# Patient Record
Sex: FEMALE | Race: WHITE | NOT HISPANIC OR LATINO | URBAN - METROPOLITAN AREA
[De-identification: names, ages, dates, MRNs, and addresses within clinical notes are randomized per-mention and may not be internally consistent; named-entity substitution may affect disease eponyms.]

---

## 2017-02-24 ENCOUNTER — EMERGENCY (EMERGENCY)
Facility: HOSPITAL | Age: 82
LOS: 1 days | Discharge: HOME | End: 2017-02-24
Attending: EMERGENCY MEDICINE | Admitting: EMERGENCY MEDICINE
Payer: MEDICARE

## 2017-02-24 VITALS
DIASTOLIC BLOOD PRESSURE: 75 MMHG | WEIGHT: 147.05 LBS | OXYGEN SATURATION: 97 % | SYSTOLIC BLOOD PRESSURE: 120 MMHG | HEART RATE: 69 BPM | RESPIRATION RATE: 14 BRPM | HEIGHT: 62 IN | TEMPERATURE: 98 F

## 2017-02-24 VITALS
HEART RATE: 72 BPM | RESPIRATION RATE: 16 BRPM | DIASTOLIC BLOOD PRESSURE: 76 MMHG | OXYGEN SATURATION: 99 % | TEMPERATURE: 98 F | SYSTOLIC BLOOD PRESSURE: 132 MMHG

## 2017-02-24 DIAGNOSIS — R10.84 GENERALIZED ABDOMINAL PAIN: ICD-10-CM

## 2017-02-24 DIAGNOSIS — F03.90 UNSPECIFIED DEMENTIA, UNSPECIFIED SEVERITY, WITHOUT BEHAVIORAL DISTURBANCE, PSYCHOTIC DISTURBANCE, MOOD DISTURBANCE, AND ANXIETY: ICD-10-CM

## 2017-02-24 DIAGNOSIS — E03.9 HYPOTHYROIDISM, UNSPECIFIED: ICD-10-CM

## 2017-02-24 LAB
ALBUMIN SERPL ELPH-MCNC: 3.3 G/DL — SIGNIFICANT CHANGE UP (ref 3.3–5)
ALP SERPL-CCNC: 52 U/L — SIGNIFICANT CHANGE UP (ref 40–120)
ALT FLD-CCNC: 10 U/L — LOW (ref 12–78)
ANION GAP SERPL CALC-SCNC: 9 MMOL/L — SIGNIFICANT CHANGE UP (ref 5–17)
AST SERPL-CCNC: 9 U/L — LOW (ref 15–37)
BASOPHILS # BLD AUTO: 0 K/UL — SIGNIFICANT CHANGE UP (ref 0–0.2)
BASOPHILS NFR BLD AUTO: 0.6 % — SIGNIFICANT CHANGE UP (ref 0–2)
BILIRUB SERPL-MCNC: 0.4 MG/DL — SIGNIFICANT CHANGE UP (ref 0.2–1.2)
BUN SERPL-MCNC: 18 MG/DL — SIGNIFICANT CHANGE UP (ref 7–23)
CALCIUM SERPL-MCNC: 8.8 MG/DL — SIGNIFICANT CHANGE UP (ref 8.5–10.1)
CHLORIDE SERPL-SCNC: 106 MMOL/L — SIGNIFICANT CHANGE UP (ref 96–108)
CO2 SERPL-SCNC: 26 MMOL/L — SIGNIFICANT CHANGE UP (ref 22–31)
CREAT SERPL-MCNC: 0.95 MG/DL — SIGNIFICANT CHANGE UP (ref 0.5–1.3)
EOSINOPHIL # BLD AUTO: 0.1 K/UL — SIGNIFICANT CHANGE UP (ref 0–0.5)
EOSINOPHIL NFR BLD AUTO: 2.4 % — SIGNIFICANT CHANGE UP (ref 0–6)
GLUCOSE SERPL-MCNC: 125 MG/DL — HIGH (ref 70–99)
HCT VFR BLD CALC: 37.4 % — SIGNIFICANT CHANGE UP (ref 34.5–45)
HGB BLD-MCNC: 11.8 G/DL — SIGNIFICANT CHANGE UP (ref 11.5–15.5)
INR BLD: 1.25 RATIO — HIGH (ref 0.88–1.16)
LIDOCAIN IGE QN: 110 U/L — SIGNIFICANT CHANGE UP (ref 73–393)
LYMPHOCYTES # BLD AUTO: 0.6 K/UL — LOW (ref 1–3.3)
LYMPHOCYTES # BLD AUTO: 12.3 % — LOW (ref 13–44)
MCHC RBC-ENTMCNC: 27 PG — SIGNIFICANT CHANGE UP (ref 27–34)
MCHC RBC-ENTMCNC: 31.7 GM/DL — LOW (ref 32–36)
MCV RBC AUTO: 85.1 FL — SIGNIFICANT CHANGE UP (ref 80–100)
MONOCYTES # BLD AUTO: 0.4 K/UL — SIGNIFICANT CHANGE UP (ref 0–0.9)
MONOCYTES NFR BLD AUTO: 9 % — SIGNIFICANT CHANGE UP (ref 1–9)
NEUTROPHILS # BLD AUTO: 3.6 K/UL — SIGNIFICANT CHANGE UP (ref 1.8–7.4)
NEUTROPHILS NFR BLD AUTO: 75.8 % — SIGNIFICANT CHANGE UP (ref 43–77)
PLATELET # BLD AUTO: 108 K/UL — LOW (ref 150–400)
POTASSIUM SERPL-MCNC: 3.9 MMOL/L — SIGNIFICANT CHANGE UP (ref 3.5–5.3)
POTASSIUM SERPL-SCNC: 3.9 MMOL/L — SIGNIFICANT CHANGE UP (ref 3.5–5.3)
PROT SERPL-MCNC: 6.7 G/DL — SIGNIFICANT CHANGE UP (ref 6–8.3)
PROTHROM AB SERPL-ACNC: 13.9 SEC — HIGH (ref 10–13.1)
RBC # BLD: 4.39 M/UL — SIGNIFICANT CHANGE UP (ref 3.8–5.2)
RBC # FLD: 14 % — SIGNIFICANT CHANGE UP (ref 10.3–14.5)
SODIUM SERPL-SCNC: 141 MMOL/L — SIGNIFICANT CHANGE UP (ref 135–145)
WBC # BLD: 4.8 K/UL — SIGNIFICANT CHANGE UP (ref 3.8–10.5)
WBC # FLD AUTO: 4.8 K/UL — SIGNIFICANT CHANGE UP (ref 3.8–10.5)

## 2017-02-24 PROCEDURE — 74176 CT ABD & PELVIS W/O CONTRAST: CPT | Mod: 26

## 2017-02-24 PROCEDURE — 99284 EMERGENCY DEPT VISIT MOD MDM: CPT

## 2017-02-24 PROCEDURE — 85027 COMPLETE CBC AUTOMATED: CPT

## 2017-02-24 PROCEDURE — 99284 EMERGENCY DEPT VISIT MOD MDM: CPT | Mod: 25

## 2017-02-24 PROCEDURE — 74176 CT ABD & PELVIS W/O CONTRAST: CPT

## 2017-02-24 PROCEDURE — 83690 ASSAY OF LIPASE: CPT

## 2017-02-24 PROCEDURE — 85610 PROTHROMBIN TIME: CPT

## 2017-02-24 PROCEDURE — 80053 COMPREHEN METABOLIC PANEL: CPT

## 2017-02-24 RX ORDER — SODIUM CHLORIDE 9 MG/ML
1000 INJECTION INTRAMUSCULAR; INTRAVENOUS; SUBCUTANEOUS ONCE
Qty: 0 | Refills: 0 | Status: COMPLETED | OUTPATIENT
Start: 2017-02-24 | End: 2017-02-24

## 2017-02-24 RX ORDER — IOHEXOL 300 MG/ML
30 INJECTION, SOLUTION INTRAVENOUS ONCE
Qty: 0 | Refills: 0 | Status: COMPLETED | OUTPATIENT
Start: 2017-02-24 | End: 2017-02-24

## 2017-02-24 RX ADMIN — SODIUM CHLORIDE 1000 MILLILITER(S): 9 INJECTION INTRAMUSCULAR; INTRAVENOUS; SUBCUTANEOUS at 11:33

## 2017-02-24 RX ADMIN — IOHEXOL 30 MILLILITER(S): 300 INJECTION, SOLUTION INTRAVENOUS at 17:19

## 2017-02-24 NOTE — ED PROVIDER NOTE - OBJECTIVE STATEMENT
89 female sent to ER from [facility] living for r/o obstruction. Patient provides no information, details obtained from chart.

## 2017-02-24 NOTE — ED ADULT NURSE NOTE - OBJECTIVE STATEMENT
pt to er sent for evaluation ab pain is confused unable to answer questions colostomy functioning pink stoma iv inserted 20 qngio left ac pt has difficulty drinking for ct scan

## 2017-02-24 NOTE — ED PROVIDER NOTE - PROGRESS NOTE DETAILS
no obstruction noted on cat scan no obstruction noted on cat scan, labs normal, patient in no acute distress, to d/c back to assisted living

## 2017-02-24 NOTE — ED ADULT NURSE NOTE - PMH
Breast CA  "many years ago"  Colon cancer  >30 years ago  Dementia    DVT (deep venous thrombosis)  left leg not on A/C  HLD (hyperlipidemia)    Hypothyroidism  newly diagnosed, started on T4 4/2013  Osteoarthritis

## 2017-03-16 ENCOUNTER — APPOINTMENT (OUTPATIENT)
Dept: COLORECTAL SURGERY | Facility: CLINIC | Age: 82
End: 2017-03-16

## 2017-03-16 DIAGNOSIS — K59.09 OTHER CONSTIPATION: ICD-10-CM

## 2017-04-27 ENCOUNTER — INPATIENT (INPATIENT)
Facility: HOSPITAL | Age: 82
LOS: 10 days | Discharge: HOSPICE | DRG: 853 | End: 2017-05-08
Attending: INTERNAL MEDICINE | Admitting: INTERNAL MEDICINE
Payer: MEDICARE

## 2017-04-27 VITALS
RESPIRATION RATE: 16 BRPM | OXYGEN SATURATION: 97 % | SYSTOLIC BLOOD PRESSURE: 114 MMHG | TEMPERATURE: 100 F | DIASTOLIC BLOOD PRESSURE: 74 MMHG | WEIGHT: 173.94 LBS | HEART RATE: 86 BPM

## 2017-04-27 DIAGNOSIS — E78.5 HYPERLIPIDEMIA, UNSPECIFIED: ICD-10-CM

## 2017-04-27 DIAGNOSIS — M19.90 UNSPECIFIED OSTEOARTHRITIS, UNSPECIFIED SITE: ICD-10-CM

## 2017-04-27 DIAGNOSIS — F03.90 UNSPECIFIED DEMENTIA, UNSPECIFIED SEVERITY, WITHOUT BEHAVIORAL DISTURBANCE, PSYCHOTIC DISTURBANCE, MOOD DISTURBANCE, AND ANXIETY: ICD-10-CM

## 2017-04-27 DIAGNOSIS — R01.1 CARDIAC MURMUR, UNSPECIFIED: ICD-10-CM

## 2017-04-27 DIAGNOSIS — A41.9 SEPSIS, UNSPECIFIED ORGANISM: ICD-10-CM

## 2017-04-27 DIAGNOSIS — N20.0 CALCULUS OF KIDNEY: ICD-10-CM

## 2017-04-27 DIAGNOSIS — Z93.3 COLOSTOMY STATUS: ICD-10-CM

## 2017-04-27 DIAGNOSIS — N39.0 URINARY TRACT INFECTION, SITE NOT SPECIFIED: ICD-10-CM

## 2017-04-27 DIAGNOSIS — E03.9 HYPOTHYROIDISM, UNSPECIFIED: ICD-10-CM

## 2017-04-27 DIAGNOSIS — I82.409 ACUTE EMBOLISM AND THROMBOSIS OF UNSPECIFIED DEEP VEINS OF UNSPECIFIED LOWER EXTREMITY: ICD-10-CM

## 2017-04-27 LAB
ALBUMIN SERPL ELPH-MCNC: 2.9 G/DL — LOW (ref 3.3–5)
ALP SERPL-CCNC: 91 U/L — SIGNIFICANT CHANGE UP (ref 40–120)
ALT FLD-CCNC: 16 U/L — SIGNIFICANT CHANGE UP (ref 12–78)
AMYLASE P1 CFR SERPL: 93 U/L — SIGNIFICANT CHANGE UP (ref 25–115)
ANION GAP SERPL CALC-SCNC: 8 MMOL/L — SIGNIFICANT CHANGE UP (ref 5–17)
APPEARANCE UR: ABNORMAL
AST SERPL-CCNC: 19 U/L — SIGNIFICANT CHANGE UP (ref 15–37)
BASOPHILS # BLD AUTO: 0 K/UL — SIGNIFICANT CHANGE UP (ref 0–0.2)
BASOPHILS NFR BLD AUTO: 0.5 % — SIGNIFICANT CHANGE UP (ref 0–2)
BILIRUB SERPL-MCNC: 0.7 MG/DL — SIGNIFICANT CHANGE UP (ref 0.2–1.2)
BILIRUB UR-MCNC: NEGATIVE — SIGNIFICANT CHANGE UP
BUN SERPL-MCNC: 33 MG/DL — HIGH (ref 7–23)
CALCIUM SERPL-MCNC: 8.8 MG/DL — SIGNIFICANT CHANGE UP (ref 8.5–10.1)
CHLORIDE SERPL-SCNC: 103 MMOL/L — SIGNIFICANT CHANGE UP (ref 96–108)
CO2 SERPL-SCNC: 24 MMOL/L — SIGNIFICANT CHANGE UP (ref 22–31)
COLOR SPEC: YELLOW — SIGNIFICANT CHANGE UP
CREAT SERPL-MCNC: 1.9 MG/DL — HIGH (ref 0.5–1.3)
DIFF PNL FLD: ABNORMAL
EOSINOPHIL # BLD AUTO: 0 K/UL — SIGNIFICANT CHANGE UP (ref 0–0.5)
EOSINOPHIL NFR BLD AUTO: 0.3 % — SIGNIFICANT CHANGE UP (ref 0–6)
GLUCOSE SERPL-MCNC: 104 MG/DL — HIGH (ref 70–99)
GLUCOSE UR QL: NEGATIVE — SIGNIFICANT CHANGE UP
HCT VFR BLD CALC: 34.4 % — LOW (ref 34.5–45)
HGB BLD-MCNC: 11.2 G/DL — LOW (ref 11.5–15.5)
KETONES UR-MCNC: ABNORMAL
LACTATE SERPL-SCNC: 1.8 MMOL/L — SIGNIFICANT CHANGE UP (ref 0.7–2)
LACTATE SERPL-SCNC: 2.4 MMOL/L — HIGH (ref 0.7–2)
LEUKOCYTE ESTERASE UR-ACNC: ABNORMAL
LIDOCAIN IGE QN: 132 U/L — SIGNIFICANT CHANGE UP (ref 73–393)
LYMPHOCYTES # BLD AUTO: 0.3 K/UL — LOW (ref 1–3.3)
LYMPHOCYTES # BLD AUTO: 3.8 % — LOW (ref 13–44)
MCHC RBC-ENTMCNC: 28.7 PG — SIGNIFICANT CHANGE UP (ref 27–34)
MCHC RBC-ENTMCNC: 32.6 GM/DL — SIGNIFICANT CHANGE UP (ref 32–36)
MCV RBC AUTO: 88 FL — SIGNIFICANT CHANGE UP (ref 80–100)
MONOCYTES # BLD AUTO: 0.7 K/UL — SIGNIFICANT CHANGE UP (ref 0–0.9)
MONOCYTES NFR BLD AUTO: 9.8 % — HIGH (ref 1–9)
NEUTROPHILS # BLD AUTO: 6.3 K/UL — SIGNIFICANT CHANGE UP (ref 1.8–7.4)
NEUTROPHILS NFR BLD AUTO: 85.5 % — HIGH (ref 43–77)
NITRITE UR-MCNC: POSITIVE
PH UR: 8 — SIGNIFICANT CHANGE UP (ref 5–8)
PLATELET # BLD AUTO: 114 K/UL — LOW (ref 150–400)
POTASSIUM SERPL-MCNC: 4.2 MMOL/L — SIGNIFICANT CHANGE UP (ref 3.5–5.3)
POTASSIUM SERPL-SCNC: 4.2 MMOL/L — SIGNIFICANT CHANGE UP (ref 3.5–5.3)
PROT SERPL-MCNC: 7.4 G/DL — SIGNIFICANT CHANGE UP (ref 6–8.3)
PROT UR-MCNC: 75 MG/DL
RBC # BLD: 3.91 M/UL — SIGNIFICANT CHANGE UP (ref 3.8–5.2)
RBC # FLD: 13.6 % — SIGNIFICANT CHANGE UP (ref 10.3–14.5)
SODIUM SERPL-SCNC: 135 MMOL/L — SIGNIFICANT CHANGE UP (ref 135–145)
SP GR SPEC: 1.01 — SIGNIFICANT CHANGE UP (ref 1.01–1.02)
TSH SERPL-MCNC: 0.67 UIU/ML — SIGNIFICANT CHANGE UP (ref 0.36–3.74)
UROBILINOGEN FLD QL: NEGATIVE — SIGNIFICANT CHANGE UP
WBC # BLD: 7.3 K/UL — SIGNIFICANT CHANGE UP (ref 3.8–10.5)
WBC # FLD AUTO: 7.3 K/UL — SIGNIFICANT CHANGE UP (ref 3.8–10.5)

## 2017-04-27 PROCEDURE — 74176 CT ABD & PELVIS W/O CONTRAST: CPT | Mod: 26

## 2017-04-27 PROCEDURE — 70450 CT HEAD/BRAIN W/O DYE: CPT | Mod: 26

## 2017-04-27 PROCEDURE — 99285 EMERGENCY DEPT VISIT HI MDM: CPT

## 2017-04-27 PROCEDURE — 71010: CPT | Mod: 26

## 2017-04-27 RX ORDER — SODIUM CHLORIDE 9 MG/ML
1000 INJECTION INTRAMUSCULAR; INTRAVENOUS; SUBCUTANEOUS ONCE
Qty: 0 | Refills: 0 | Status: COMPLETED | OUTPATIENT
Start: 2017-04-27 | End: 2017-04-27

## 2017-04-27 RX ORDER — LACTULOSE 10 G/15ML
20 SOLUTION ORAL EVERY OTHER DAY
Qty: 0 | Refills: 0 | Status: DISCONTINUED | OUTPATIENT
Start: 2017-04-27 | End: 2017-05-05

## 2017-04-27 RX ORDER — LORATADINE 10 MG/1
10 TABLET ORAL DAILY
Qty: 0 | Refills: 0 | Status: DISCONTINUED | OUTPATIENT
Start: 2017-04-27 | End: 2017-04-30

## 2017-04-27 RX ORDER — SIMVASTATIN 20 MG/1
10 TABLET, FILM COATED ORAL AT BEDTIME
Qty: 0 | Refills: 0 | Status: DISCONTINUED | OUTPATIENT
Start: 2017-04-27 | End: 2017-04-30

## 2017-04-27 RX ORDER — PIPERACILLIN AND TAZOBACTAM 4; .5 G/20ML; G/20ML
3.38 INJECTION, POWDER, LYOPHILIZED, FOR SOLUTION INTRAVENOUS EVERY 8 HOURS
Qty: 0 | Refills: 0 | Status: DISCONTINUED | OUTPATIENT
Start: 2017-04-27 | End: 2017-05-02

## 2017-04-27 RX ORDER — ACETAMINOPHEN 500 MG
650 TABLET ORAL EVERY 6 HOURS
Qty: 0 | Refills: 0 | Status: DISCONTINUED | OUTPATIENT
Start: 2017-04-27 | End: 2017-05-03

## 2017-04-27 RX ORDER — SODIUM CHLORIDE 9 MG/ML
500 INJECTION INTRAMUSCULAR; INTRAVENOUS; SUBCUTANEOUS ONCE
Qty: 0 | Refills: 0 | Status: COMPLETED | OUTPATIENT
Start: 2017-04-27 | End: 2017-04-27

## 2017-04-27 RX ORDER — HEPARIN SODIUM 5000 [USP'U]/ML
5000 INJECTION INTRAVENOUS; SUBCUTANEOUS EVERY 12 HOURS
Qty: 0 | Refills: 0 | Status: DISCONTINUED | OUTPATIENT
Start: 2017-04-27 | End: 2017-05-05

## 2017-04-27 RX ORDER — CEFTRIAXONE 500 MG/1
1 INJECTION, POWDER, FOR SOLUTION INTRAMUSCULAR; INTRAVENOUS ONCE
Qty: 0 | Refills: 0 | Status: COMPLETED | OUTPATIENT
Start: 2017-04-27 | End: 2017-04-27

## 2017-04-27 RX ORDER — ACETAMINOPHEN 500 MG
650 TABLET ORAL EVERY 6 HOURS
Qty: 0 | Refills: 0 | Status: DISCONTINUED | OUTPATIENT
Start: 2017-04-27 | End: 2017-05-05

## 2017-04-27 RX ORDER — MORPHINE SULFATE 50 MG/1
2 CAPSULE, EXTENDED RELEASE ORAL EVERY 4 HOURS
Qty: 0 | Refills: 0 | Status: DISCONTINUED | OUTPATIENT
Start: 2017-04-27 | End: 2017-04-27

## 2017-04-27 RX ORDER — FAMOTIDINE 10 MG/ML
20 INJECTION INTRAVENOUS DAILY
Qty: 0 | Refills: 0 | Status: DISCONTINUED | OUTPATIENT
Start: 2017-04-27 | End: 2017-04-30

## 2017-04-27 RX ORDER — TAMSULOSIN HYDROCHLORIDE 0.4 MG/1
0.4 CAPSULE ORAL AT BEDTIME
Qty: 0 | Refills: 0 | Status: DISCONTINUED | OUTPATIENT
Start: 2017-04-27 | End: 2017-05-05

## 2017-04-27 RX ORDER — ONDANSETRON 8 MG/1
4 TABLET, FILM COATED ORAL EVERY 6 HOURS
Qty: 0 | Refills: 0 | Status: DISCONTINUED | OUTPATIENT
Start: 2017-04-27 | End: 2017-05-05

## 2017-04-27 RX ORDER — ACETAMINOPHEN 500 MG
650 TABLET ORAL ONCE
Qty: 0 | Refills: 0 | Status: COMPLETED | OUTPATIENT
Start: 2017-04-27 | End: 2017-04-27

## 2017-04-27 RX ORDER — PIPERACILLIN AND TAZOBACTAM 4; .5 G/20ML; G/20ML
3.38 INJECTION, POWDER, LYOPHILIZED, FOR SOLUTION INTRAVENOUS ONCE
Qty: 0 | Refills: 0 | Status: COMPLETED | OUTPATIENT
Start: 2017-04-27 | End: 2017-04-27

## 2017-04-27 RX ORDER — LACTOBACILLUS ACIDOPHILUS 100MM CELL
1 CAPSULE ORAL
Qty: 0 | Refills: 0 | Status: DISCONTINUED | OUTPATIENT
Start: 2017-04-27 | End: 2017-05-05

## 2017-04-27 RX ORDER — SODIUM CHLORIDE 9 MG/ML
3 INJECTION INTRAMUSCULAR; INTRAVENOUS; SUBCUTANEOUS ONCE
Qty: 0 | Refills: 0 | Status: COMPLETED | OUTPATIENT
Start: 2017-04-27 | End: 2017-04-27

## 2017-04-27 RX ORDER — SODIUM CHLORIDE 9 MG/ML
1000 INJECTION INTRAMUSCULAR; INTRAVENOUS; SUBCUTANEOUS
Qty: 0 | Refills: 0 | Status: DISCONTINUED | OUTPATIENT
Start: 2017-04-27 | End: 2017-05-05

## 2017-04-27 RX ORDER — LEVOTHYROXINE SODIUM 125 MCG
100 TABLET ORAL DAILY
Qty: 0 | Refills: 0 | Status: DISCONTINUED | OUTPATIENT
Start: 2017-04-27 | End: 2017-05-05

## 2017-04-27 RX ORDER — FLUTICASONE PROPIONATE 50 MCG
1 SPRAY, SUSPENSION NASAL DAILY
Qty: 0 | Refills: 0 | Status: DISCONTINUED | OUTPATIENT
Start: 2017-04-27 | End: 2017-04-30

## 2017-04-27 RX ORDER — LABETALOL HCL 100 MG
100 TABLET ORAL
Qty: 0 | Refills: 0 | Status: DISCONTINUED | OUTPATIENT
Start: 2017-04-27 | End: 2017-04-30

## 2017-04-27 RX ADMIN — SODIUM CHLORIDE 1000 MILLILITER(S): 9 INJECTION INTRAMUSCULAR; INTRAVENOUS; SUBCUTANEOUS at 15:07

## 2017-04-27 RX ADMIN — SODIUM CHLORIDE 75 MILLILITER(S): 9 INJECTION INTRAMUSCULAR; INTRAVENOUS; SUBCUTANEOUS at 22:35

## 2017-04-27 RX ADMIN — SODIUM CHLORIDE 3 MILLILITER(S): 9 INJECTION INTRAMUSCULAR; INTRAVENOUS; SUBCUTANEOUS at 15:07

## 2017-04-27 RX ADMIN — SODIUM CHLORIDE 1000 MILLILITER(S): 9 INJECTION INTRAMUSCULAR; INTRAVENOUS; SUBCUTANEOUS at 16:00

## 2017-04-27 RX ADMIN — Medication 650 MILLIGRAM(S): at 15:35

## 2017-04-27 RX ADMIN — CEFTRIAXONE 100 GRAM(S): 500 INJECTION, POWDER, FOR SOLUTION INTRAMUSCULAR; INTRAVENOUS at 15:36

## 2017-04-27 RX ADMIN — Medication 650 MILLIGRAM(S): at 23:40

## 2017-04-27 RX ADMIN — SODIUM CHLORIDE 500 MILLILITER(S): 9 INJECTION INTRAMUSCULAR; INTRAVENOUS; SUBCUTANEOUS at 16:59

## 2017-04-27 RX ADMIN — PIPERACILLIN AND TAZOBACTAM 200 GRAM(S): 4; .5 INJECTION, POWDER, LYOPHILIZED, FOR SOLUTION INTRAVENOUS at 22:38

## 2017-04-27 NOTE — ED PROVIDER NOTE - OBJECTIVE STATEMENT
Pt is a 89 yo female who presents to the ED with a cc of fever T max of 101.  Pt confused and unable to provide further history at this time Pt is a 89 yo female who presents to the ED from [facility] with a cc of fever T max of 101.  Pt confused and unable to provide further history at this time.  Has history of dementia and previous CVA with right sided hemiparesis

## 2017-04-27 NOTE — ED PROVIDER NOTE - PROGRESS NOTE DETAILS
Spoke with pt niece who is health care proxy.  Wishes to explore possible intervention.  Urology paged Dr. Chang made aware Dr. Chang spoke with daughter, Jaclyn Meek, [phone], decided not to go for surgical intervention, to do medical management Labs and urine reviewed.  Pt likely suffering from sepsis secondary to UTI.  Concern for obstructing stone at this time based on elevated Cr.  Pt vitals stable, remains confused.  Covered with abx an given fluid boluses.  Will obtain CT renal stone hunt and CT head at this time Spoke with pt niece Anahi Vivar who is health care proxy.  Wishes to explore possible intervention but would like to speak with urologist prior to consent.  Pt is a DNR/DNI and she wants to know all options prior to making decision.  Urology paged Dr. Chang made aware of pt condition.  Suffering from sepsis secondary to  UTI likely caused from obstructing calculi.  Pt ia a DNR/DNI with multiple co-morbid conditions.  Health care proxy's number given he will contact to discuss all options.  Pt signed out to Dr. Young pending decision on OR

## 2017-04-27 NOTE — ED ADULT NURSE NOTE - NS PRO AD PATIENT TYPE
Medical Orders for Life-Sustaining Treatment (MOLST)/Do Not Resuscitate (DNR)/Health Care Proxy (HCP)

## 2017-04-27 NOTE — H&P ADULT - HISTORY OF PRESENT ILLNESS
Pt is a 89 yo female ,[facility] resident with hx od CAD,HTN,HLD,DVT ,Breast ca,colon ca, s/p colostomy ,OA who presents to the ED with a cc of fever T max of 101.  Pt confused and unable  provide further history at this time Diagnosed with renal colic,found to have moderate to severe hydronephrosis with obstructing kidney stone and  eval called by ER Scheduled for OR in am for stent placement as per Dr Chang, urologist arrangement Admit for septic workup and ID evaluation,send blood and urine cx,serial lactate levels,monitor vitals closley,ivfs hydration,monitor urine output and renal profile,iv abx as per id cons Dr Bolden Palliative care consult requested due to overall poor prognosis,to discuss advance directives and complete MOLST

## 2017-04-27 NOTE — PATIENT PROFILE ADULT. - PMH
Hypothyroidism  newly diagnosed, started on T4 4/2013  Osteoarthritis    Colon cancer  >30 years ago  Breast CA  "many years ago"  DVT (deep venous thrombosis)  left leg not on A/C  HLD (hyperlipidemia)    Dementia

## 2017-04-27 NOTE — CONSULT NOTE ADULT - SUBJECTIVE AND OBJECTIVE BOX
Patient is a 89y old  Female who presents with a chief complaint of     HPI:      Asked to see patient for ID Consult    PAST MEDICAL & SURGICAL HISTORY:  Hypothyroidism: newly diagnosed, started on T4 4/2013  Osteoarthritis  Colon cancer: &gt;30 years ago  Breast CA: &quot;many years ago&quot;  DVT (deep venous thrombosis): left leg not on A/C  HLD (hyperlipidemia)  Dementia  History of hysterectomy  Colostomy in place  Colon perforation  Ileostomy in place      Allergies    No Known Allergies    Intolerances        REVIEW OF SYSTEMS:  All systems below were reviewed and are negative [  ]  HEENT:  ID:  Pulmonary:  Cardiac:  GI:  Renal:  Musculoskeletal:  All other systems above were reviewed and are negative   [  ]    HOME MEDICATIONS:    MEDICATIONS  (STANDING):  simvastatin 10milliGRAM(s) Oral at bedtime  labetalol 100milliGRAM(s) Oral two times a day  heparin  Injectable 5000Unit(s) SubCutaneous every 12 hours  sodium chloride 0.9%. 1000milliLiter(s) IV Continuous <Continuous>  levothyroxine 100MICROGram(s) Oral daily  lactobacillus acidophilus 1Tablet(s) Oral two times a day with meals  famotidine    Tablet 20milliGRAM(s) Oral daily  tamsulosin 0.4milliGRAM(s) Oral at bedtime  lactulose Syrup 20Gram(s) Oral every other day  fluticasone propionate 50 MICROgram(s)/spray Nasal Spray 1Spray(s) Alternating Nostrils daily  loratadine 10milliGRAM(s) Oral daily    MEDICATIONS  (PRN):  acetaminophen   Tablet 650milliGRAM(s) Oral every 6 hours PRN For Temp greater than 38 C (100.4 F)  acetaminophen   Tablet. 650milliGRAM(s) Oral every 6 hours PRN Mild Pain (1 - 3)  morphine  - Injectable 2milliGRAM(s) IV Push every 4 hours PRN Moderate Pain (4 - 6)  ondansetron Injectable 4milliGRAM(s) IV Push every 6 hours PRN Nausea and/or Vomiting      Vital Signs Last 24 Hrs  T(C): 37.8, Max: 38.8 (04-27 @ 15:09)  T(F): 100, Max: 101.9 (04-27 @ 15:09)  HR: 79 (79 - 86)  BP: 124/81 (114/74 - 149/65)  BP(mean): --  RR: 15 (15 - 16)  SpO2: 95% (95% - 97%)    PHYSICAL EXAM:  HEENT:  Neck:  Lungs:  Heart:  Abdomen:  Genital/ Rectal:  Extremities:  Neurologic:  Vascular:    I&O's Summary      LABORATORY:                          11.2   7.3   )-----------( 114      ( 27 Apr 2017 15:18 )             34.4           04-27    135  |  103  |  33<H>  ----------------------------<  104<H>  4.2   |  24  |  1.90<H>    Ca    8.8      27 Apr 2017 15:18    TPro  7.4  /  Alb  2.9<L>  /  TBili  0.7  /  DBili  x   /  AST  19  /  ALT  16  /  AlkPhos  91  04-27          LABORATORY:    CBC Full  -  ( 27 Apr 2017 15:18 )  WBC Count : 7.3 K/uL  Hemoglobin : 11.2 g/dL  Hematocrit : 34.4 %  Platelet Count - Automated : 114 K/uL  Mean Cell Volume : 88.0 fl  Mean Cell Hemoglobin : 28.7 pg  Mean Cell Hemoglobin Concentration : 32.6 gm/dL  Auto Neutrophil # : 6.3 K/uL  Auto Lymphocyte # : 0.3 K/uL  Auto Monocyte # : 0.7 K/uL  Auto Eosinophil # : 0.0 K/uL  Auto Basophil # : 0.0 K/uL  Auto Neutrophil % : 85.5 %  Auto Lymphocyte % : 3.8 %  Auto Monocyte % : 9.8 %  Auto Eosinophil % : 0.3 %  Auto Basophil % : 0.5 %          04-27    135  |  103  |  33<H>  ----------------------------<  104<H>  4.2   |  24  |  1.90<H>    Ca    8.8      27 Apr 2017 15:18    TPro  7.4  /  Alb  2.9<L>  /  TBili  0.7  /  DBili  x   /  AST  19  /  ALT  16  /  AlkPhos  91 04-27 Patient is a 89y old  Female who presents with a chief complaint of fever    The patient is a 89 year old female with a history of colon CA, DVT, dementia and hypothyroidism who was brought to the ED for fevers. She had CT of abdomen and pelvis done which showed marked left hydronephrosis with many left renal stones. The patient had a fever of 101.8F and was given IV Rocephin.       PAST MEDICAL & SURGICAL HISTORY:  Hypothyroidism: newly diagnosed, started on T4 4/2013  Osteoarthritis  Colon cancer: &gt;30 years ago  Breast CA: &quot;many years ago&quot;  DVT (deep venous thrombosis): left leg not on A/C  HLD (hyperlipidemia)  Dementia  History of hysterectomy  Colostomy in place  Colon perforation  Ileostomy in place      Allergies    No Known Allergies    REVIEW OF SYSTEMS:    No cough or SOB.  No diarrhea or GI bleed.    HOME MEDICATIONS:    MEDICATIONS  (STANDING):  simvastatin 10milliGRAM(s) Oral at bedtime  labetalol 100milliGRAM(s) Oral two times a day  heparin  Injectable 5000Unit(s) SubCutaneous every 12 hours  sodium chloride 0.9%. 1000milliLiter(s) IV Continuous <Continuous>  levothyroxine 100MICROGram(s) Oral daily  lactobacillus acidophilus 1Tablet(s) Oral two times a day with meals  famotidine    Tablet 20milliGRAM(s) Oral daily  tamsulosin 0.4milliGRAM(s) Oral at bedtime  lactulose Syrup 20Gram(s) Oral every other day  fluticasone propionate 50 MICROgram(s)/spray Nasal Spray 1Spray(s) Alternating Nostrils daily  loratadine 10milliGRAM(s) Oral daily    MEDICATIONS  (PRN):  acetaminophen   Tablet 650milliGRAM(s) Oral every 6 hours PRN For Temp greater than 38 C (100.4 F)  acetaminophen   Tablet. 650milliGRAM(s) Oral every 6 hours PRN Mild Pain (1 - 3)  morphine  - Injectable 2milliGRAM(s) IV Push every 4 hours PRN Moderate Pain (4 - 6)  ondansetron Injectable 4milliGRAM(s) IV Push every 6 hours PRN Nausea and/or Vomiting      Vital Signs Last 24 Hrs  T(C): 37.8, Max: 38.8 (04-27 @ 15:09)  T(F): 100, Max: 101.9 (04-27 @ 15:09)  HR: 79 (79 - 86)  BP: 124/81 (114/74 - 149/65)  BP(mean): --  RR: 15 (15 - 16)  SpO2: 95% (95% - 97%)    PHYSICAL EXAM:  HEENT: NC/AT; PERRLA.  Neck: Soft; no masses.  Lungs: Coarse BS bilaterally; no wheezing  Heart: RRR; no murmurs.  Abdomen: Soft; no masses.  Extremities: No ulcers.  Neuro: lethargic.      LABORATORY:                          11.2   7.3   )-----------( 114      ( 27 Apr 2017 15:18 )             34.4           04-27    135  |  103  |  33<H>  ----------------------------<  104<H>  4.2   |  24  |  1.90<H>    Ca    8.8      27 Apr 2017 15:18    TPro  7.4  /  Alb  2.9<L>  /  TBili  0.7  /  DBili  x   /  AST  19  /  ALT  16  /  AlkPhos  91  04-27          LABORATORY:    CBC Full  -  ( 27 Apr 2017 15:18 )  WBC Count : 7.3 K/uL  Hemoglobin : 11.2 g/dL  Hematocrit : 34.4 %  Platelet Count - Automated : 114 K/uL  Mean Cell Volume : 88.0 fl  Mean Cell Hemoglobin : 28.7 pg  Mean Cell Hemoglobin Concentration : 32.6 gm/dL  Auto Neutrophil # : 6.3 K/uL  Auto Lymphocyte # : 0.3 K/uL  Auto Monocyte # : 0.7 K/uL  Auto Eosinophil # : 0.0 K/uL  Auto Basophil # : 0.0 K/uL  Auto Neutrophil % : 85.5 %  Auto Lymphocyte % : 3.8 %  Auto Monocyte % : 9.8 %  Auto Eosinophil % : 0.3 %  Auto Basophil % : 0.5 %          04-27    135  |  103  |  33<H>  ----------------------------<  104<H>  4.2   |  24  |  1.90<H>    Ca    8.8      27 Apr 2017 15:18    TPro  7.4  /  Alb  2.9<L>  /  TBili  0.7  /  DBili  x   /  AST  19  /  ALT  16  /  AlkPhos  91  04-27        Assessment/plan:    1. Marked left hydronephrosis due to obstructed left renal stones.  2. UTI.  3. Fevers.    . Urology evaluation.  . Add Zosyn 3.375 gm q8h.  . Follow all cultures.    Thank you for the courtesy of this consultation

## 2017-04-27 NOTE — ED PROVIDER NOTE - CARE PLAN
Principal Discharge DX:	Sepsis  Secondary Diagnosis:	UTI (urinary tract infection)  Secondary Diagnosis:	Nephrolithiasis

## 2017-04-27 NOTE — ED PROVIDER NOTE - ENMT, MLM
Airway patent, Nasal mucosa clear. Mouth with normal mucosa. Airway patent, Nasal mucosa clear. Mouth with dry mucosa.

## 2017-04-27 NOTE — H&P ADULT - PROBLEM SELECTOR PLAN 1
Admit for septic workup and ID evaluation,send blood and urine cx,serial lactate levels,monitor vitals closley,ivfs hydration,monitor urine output and renal profile,iv abx as per id cons  malcolm reuested

## 2017-04-27 NOTE — H&P ADULT - ASSESSMENT
Pt is a 89 yo female ,[facility] resident with hx od CAD,HTN,HLD,DVT ,Breast ca,colon ca, s/p colostomy ,OA who presents to the ED with a cc of fever T max of 101.  Pt confused and unable  provide further history at this time Diagnosed with renal colic,found to have moderate to severe hydronephrosis with obstructing kidney stone and  eval called by ER Scheduled for OR in am for stent placement as per Dr Chang, urologist arrangement Admit for septic workup and ID evaluation,send blood and urine cx,serial lactate levels,monitor vitals closley,ivfs hydration,monitor urine output and renal profile,iv abx as per id cons Dr Bolden Palliative care consult requested due to overall poor prognosis,to discuss advance directives and complete MOLST

## 2017-04-27 NOTE — CONSULT NOTE ADULT - SUBJECTIVE AND OBJECTIVE BOX
CARDIOLOGY CONSULT NOTE    Patient is a 89y Female with a known history of :    HPI:  89y/o w/f seen at [facility] ER. Lives at [facility]. Patient seen for fever and she is confused, so therefore history from chart.  History Dementia, Breast cancer, Colon cancer (colostomy in place), high cholesterol, thyroid disease, OA, left DVT.   S/P SALINA.  In ER found to have temperture-101.9 and abnormal U/A.    REVIEW OF SYSTEMS:    CONSTITUTIONAL:   Unable to obtain, patient with dementia, confused.    ALLERGY AND IMMUNOLOGIC: No hives or eczema    MEDICATIONS  (STANDING):    MEDICATIONS  (PRN):      ALLERGIES: No Known Allergies      FAMILY HISTORY:      Social History:  Alochol:   Smoking:   Drug Use:   Marital Status:     I&O's Detail      PHYSICAL EXAMINATION:  -----------------------------  T(C): 38.8, Max: 38.8 (04-27 @ 15:09)  HR: 79 (79 - 86)  BP: 126/60 (114/74 - 149/65)  RR: 16 (15 - 16)  SpO2: 96% (96% - 97%)  Wt(kg): --      Weight (kg): 78.9 (04-27 @ 14:43)    Constitutional: well developed, normal appearance, well groomed, well nourished, no deformities and no acute distress.   Eyes: the conjunctiva exhibited no abnormalities and the eyelids demonstrated no xanthelasmas.   HEENT: normal oral mucosa, no oral pallor and no oral cyanosis.   Neck: normal jugular venous A waves present, normal jugular venous V waves present and no jugular venous hua A waves.   Pulmonary: Poor effort, no respiratory distress, normal respiratory rhythm and effort, no accessory muscle use and lungs were clear to auscultation bilaterally anteriorly.   Cardiovascular: heart rate and rhythm were normal, decreased S1 and normal S2 and II/VI systolic murmur.   Musculoskeletal: the gait could not be assessed..   Extremities: no clubbing of the fingernails, no localized cyanosis, no petechial hemorrhages and no ischemic changes.   Skin: normal skin color and pigmentation, no rash, no venous stasis, no skin lesions, no skin ulcer and no xanthoma was observed.   Psychiatric: oriented to person, place, and time, the affect was normal, the mood was normal and not feeling anxious.     LABS:   --------  04-27    135  |  103  |  33<H>  ----------------------------<  104<H>  4.2   |  24  |  1.90<H>    Ca    8.8      27 Apr 2017 15:18    TPro  7.4  /  Alb  2.9<L>  /  TBili  0.7  /  DBili  x   /  AST  19  /  ALT  16  /  AlkPhos  91  04-27                         11.2   7.3   )-----------( 114      ( 27 Apr 2017 15:18 )             34.4                   RADIOLOGY:  -----------------      EXAM:  CT ABDOMEN AND PELVIS                            PROCEDURE DATE:  04/27/2017        INTERPRETATION:  History: Fever, UTI.    Noncontrast CT abdomen and pelvis. Prior dated 2/24/2017.  Increase in the bibasilar congestion and small effusions.  Multiple left renal pelvic calculi, largest measuring up to 1.7 cm   similar to prior. There is interval increase in left hydronephrosis.   Severe obstructive left hydronephrosis at this time with perirenal   stranding. Correlate for concomitant infection. Study is otherwise   unchanged. Large right lower quadrant ostomy hernia containing   nonobstructive large and small bowel. No acute bowel inflammation.   Moderate rectal fecal impaction. No extraluminal fluid or gas.  Bladder not adequately distended.  No acute skeletal abnormality.    Impression: Marked obstructive left hydronephrosis secondary to multiple   left renal pelvic calculi              BETTINA KELLEY M.D., ATTENDING RADIOLOGIST  This document has been electronically signed. Apr 27 2017  4:48PM            EXAM:  CT BRAIN                            PROCEDURE DATE:  04/27/2017        INTERPRETATION:  History: Altered mental status.    Noncontrast brain CT    Prior 6/14/2016.  Stable ventricular size and configuration. Chronic ischemic white matter   changes chronic lacunar infarcts bilateral basal ganglia left corona   radiata similar to prior. No acute infarct bleed extra-axial collection   or mass effect. Clear sinuses. Cranium intact.    Impression: No acute intracranial pathology. Stable exam              BETTINA KELLEY M.D., ATTENDING RADIOLOGIST  This document has been electronically signed. Apr 27 2017  4:51PM                  ECG: NSR, RBBB, cannot R/O IWMI

## 2017-04-27 NOTE — H&P ADULT - PROBLEM SELECTOR PLAN 7
continue current managmnet and medications Palliative care consult requested due to overall poor prognosis,to discuss advance directives and complete MOLST

## 2017-04-27 NOTE — CONSULT NOTE ADULT - ASSESSMENT
89y/o w/f seen at [facility] ER. Lives at [facility]. Patient seen for fever and she is confused, so therefore history from chart.  History Dementia, Breast cancer, Colon cancer (colostomy in place), high cholesterol, thyroid disease, OA.   S/P SALINA.  In ER found to have temperture-101.9 and abnormal U/A.    Patient is in her optimal cardiac condition and therefore, no obvious, significant cardiac contra-indications for any urological procedure.

## 2017-04-27 NOTE — ED ADULT NURSE NOTE - OBJECTIVE STATEMENT
pt came in ED sent in from [facility] for fever. pt awake, confused rectal temp noted 101.9F. with right ascending colostomy in place. non-labored respiration noted. abdomen slightly distended on the colostomy area. pt noted with right upper arm weakness, from Hx CVA. on fall precaution.

## 2017-04-27 NOTE — ED PROVIDER NOTE - CONDUCTED A DETAILED DISCUSSION WITH PATIENT AND/OR GUARDIAN REGARDING, MDM
need to admit/lab results/radiology results/return to ED if symptoms worsen, persist or questions arise

## 2017-04-28 DIAGNOSIS — Z71.89 OTHER SPECIFIED COUNSELING: ICD-10-CM

## 2017-04-28 DIAGNOSIS — A41.9 SEPSIS, UNSPECIFIED ORGANISM: ICD-10-CM

## 2017-04-28 DIAGNOSIS — F03.90 UNSPECIFIED DEMENTIA, UNSPECIFIED SEVERITY, WITHOUT BEHAVIORAL DISTURBANCE, PSYCHOTIC DISTURBANCE, MOOD DISTURBANCE, AND ANXIETY: ICD-10-CM

## 2017-04-28 DIAGNOSIS — N13.2 HYDRONEPHROSIS WITH RENAL AND URETERAL CALCULOUS OBSTRUCTION: ICD-10-CM

## 2017-04-28 LAB
ALBUMIN SERPL ELPH-MCNC: 2.5 G/DL — LOW (ref 3.3–5)
ALP SERPL-CCNC: 70 U/L — SIGNIFICANT CHANGE UP (ref 40–120)
ALT FLD-CCNC: 13 U/L — SIGNIFICANT CHANGE UP (ref 12–78)
ANION GAP SERPL CALC-SCNC: 10 MMOL/L — SIGNIFICANT CHANGE UP (ref 5–17)
AST SERPL-CCNC: 18 U/L — SIGNIFICANT CHANGE UP (ref 15–37)
BASE EXCESS BLDA CALC-SCNC: -6.4 MMOL/L — LOW (ref -2–2)
BASOPHILS # BLD AUTO: 0 K/UL — SIGNIFICANT CHANGE UP (ref 0–0.2)
BASOPHILS NFR BLD AUTO: 0.6 % — SIGNIFICANT CHANGE UP (ref 0–2)
BILIRUB SERPL-MCNC: 0.5 MG/DL — SIGNIFICANT CHANGE UP (ref 0.2–1.2)
BLOOD GAS COMMENTS ARTERIAL: SIGNIFICANT CHANGE UP
BUN SERPL-MCNC: 27 MG/DL — HIGH (ref 7–23)
CALCIUM SERPL-MCNC: 7.9 MG/DL — LOW (ref 8.5–10.1)
CHLORIDE SERPL-SCNC: 110 MMOL/L — HIGH (ref 96–108)
CO2 SERPL-SCNC: 23 MMOL/L — SIGNIFICANT CHANGE UP (ref 22–31)
CREAT SERPL-MCNC: 1.6 MG/DL — HIGH (ref 0.5–1.3)
EOSINOPHIL # BLD AUTO: 0.1 K/UL — SIGNIFICANT CHANGE UP (ref 0–0.5)
EOSINOPHIL NFR BLD AUTO: 1.4 % — SIGNIFICANT CHANGE UP (ref 0–6)
GLUCOSE SERPL-MCNC: 102 MG/DL — HIGH (ref 70–99)
HCO3 BLDA-SCNC: 20 MMOL/L — LOW (ref 23–27)
HCT VFR BLD CALC: 30.7 % — LOW (ref 34.5–45)
HGB BLD-MCNC: 9.8 G/DL — LOW (ref 11.5–15.5)
HOROWITZ INDEX BLDA+IHG-RTO: 21 — SIGNIFICANT CHANGE UP
INR BLD: 1.41 RATIO — HIGH (ref 0.88–1.16)
LACTATE SERPL-SCNC: 0.9 MMOL/L — SIGNIFICANT CHANGE UP (ref 0.7–2)
LYMPHOCYTES # BLD AUTO: 0.3 K/UL — LOW (ref 1–3.3)
LYMPHOCYTES # BLD AUTO: 5.5 % — LOW (ref 13–44)
MCHC RBC-ENTMCNC: 28.6 PG — SIGNIFICANT CHANGE UP (ref 27–34)
MCHC RBC-ENTMCNC: 32 GM/DL — SIGNIFICANT CHANGE UP (ref 32–36)
MCV RBC AUTO: 89.2 FL — SIGNIFICANT CHANGE UP (ref 80–100)
MONOCYTES # BLD AUTO: 0.6 K/UL — SIGNIFICANT CHANGE UP (ref 0–0.9)
MONOCYTES NFR BLD AUTO: 10.9 % — HIGH (ref 1–9)
NEUTROPHILS # BLD AUTO: 4.4 K/UL — SIGNIFICANT CHANGE UP (ref 1.8–7.4)
NEUTROPHILS NFR BLD AUTO: 81.6 % — HIGH (ref 43–77)
PCO2 BLDA: 25 MMHG — LOW (ref 32–46)
PH BLDA: 7.45 — SIGNIFICANT CHANGE UP (ref 7.35–7.45)
PLATELET # BLD AUTO: 81 K/UL — LOW (ref 150–400)
PO2 BLDA: 55 MMHG — LOW (ref 74–108)
POTASSIUM SERPL-MCNC: 3.9 MMOL/L — SIGNIFICANT CHANGE UP (ref 3.5–5.3)
POTASSIUM SERPL-SCNC: 3.9 MMOL/L — SIGNIFICANT CHANGE UP (ref 3.5–5.3)
PROT SERPL-MCNC: 6.3 G/DL — SIGNIFICANT CHANGE UP (ref 6–8.3)
PROTHROM AB SERPL-ACNC: 15.5 SEC — HIGH (ref 9.8–12.7)
RBC # BLD: 3.45 M/UL — LOW (ref 3.8–5.2)
RBC # FLD: 14.1 % — SIGNIFICANT CHANGE UP (ref 10.3–14.5)
SAO2 % BLDA: 90 % — LOW (ref 92–96)
SODIUM SERPL-SCNC: 143 MMOL/L — SIGNIFICANT CHANGE UP (ref 135–145)
WBC # BLD: 5.4 K/UL — SIGNIFICANT CHANGE UP (ref 3.8–10.5)
WBC # FLD AUTO: 5.4 K/UL — SIGNIFICANT CHANGE UP (ref 3.8–10.5)

## 2017-04-28 PROCEDURE — 93970 EXTREMITY STUDY: CPT | Mod: 26

## 2017-04-28 RX ADMIN — PIPERACILLIN AND TAZOBACTAM 25 GRAM(S): 4; .5 INJECTION, POWDER, LYOPHILIZED, FOR SOLUTION INTRAVENOUS at 21:08

## 2017-04-28 RX ADMIN — PIPERACILLIN AND TAZOBACTAM 25 GRAM(S): 4; .5 INJECTION, POWDER, LYOPHILIZED, FOR SOLUTION INTRAVENOUS at 14:26

## 2017-04-28 RX ADMIN — HEPARIN SODIUM 5000 UNIT(S): 5000 INJECTION INTRAVENOUS; SUBCUTANEOUS at 17:20

## 2017-04-28 RX ADMIN — Medication 1 TABLET(S): at 08:39

## 2017-04-28 RX ADMIN — HEPARIN SODIUM 5000 UNIT(S): 5000 INJECTION INTRAVENOUS; SUBCUTANEOUS at 05:29

## 2017-04-28 RX ADMIN — LORATADINE 10 MILLIGRAM(S): 10 TABLET ORAL at 17:20

## 2017-04-28 RX ADMIN — Medication 1 SPRAY(S): at 11:26

## 2017-04-28 RX ADMIN — PIPERACILLIN AND TAZOBACTAM 25 GRAM(S): 4; .5 INJECTION, POWDER, LYOPHILIZED, FOR SOLUTION INTRAVENOUS at 05:29

## 2017-04-28 RX ADMIN — Medication 100 MICROGRAM(S): at 05:30

## 2017-04-28 RX ADMIN — Medication 1 TABLET(S): at 17:20

## 2017-04-28 RX ADMIN — SIMVASTATIN 10 MILLIGRAM(S): 20 TABLET, FILM COATED ORAL at 21:07

## 2017-04-28 RX ADMIN — Medication 650 MILLIGRAM(S): at 12:40

## 2017-04-28 RX ADMIN — Medication 650 MILLIGRAM(S): at 21:07

## 2017-04-28 RX ADMIN — SODIUM CHLORIDE 75 MILLILITER(S): 9 INJECTION INTRAMUSCULAR; INTRAVENOUS; SUBCUTANEOUS at 21:08

## 2017-04-28 RX ADMIN — TAMSULOSIN HYDROCHLORIDE 0.4 MILLIGRAM(S): 0.4 CAPSULE ORAL at 21:07

## 2017-04-28 NOTE — PROGRESS NOTE ADULT - SUBJECTIVE AND OBJECTIVE BOX
PROGRESS NOTE    OVERNIGHT    SUBJECTIVE    PAST MEDICAL & SURGICAL HISTORY:  Hypothyroidism: newly diagnosed, started on T4 2013  Osteoarthritis  Colon cancer: &gt;30 years ago  Breast CA: &quot;many years ago&quot;  DVT (deep venous thrombosis): left leg not on A/C  HLD (hyperlipidemia)  Dementia  History of hysterectomy  Colostomy in place  Colon perforation  Ileostomy in place    HEALTH ISSUES - PROBLEM Dx:  HLD (hyperlipidemia): HLD (hyperlipidemia)  Colostomy in place: Colostomy in place  Dementia: Dementia  Osteoarthritis: Osteoarthritis  DVT (deep venous thrombosis): DVT (deep venous thrombosis)  Hypothyroidism: Hypothyroidism  Heart murmur: Heart murmur  Nephrolithiasis: Nephrolithiasis  Urinary tract infection with hematuria, site unspecified: Urinary tract infection with hematuria, site unspecified        FAMILY HISTORY:  No pertinent family history in first degree relatives    Allergies    No Known Allergies    Intolerances        MEDICATIONS  (STANDING):  simvastatin 10milliGRAM(s) Oral at bedtime  labetalol 100milliGRAM(s) Oral two times a day  heparin  Injectable 5000Unit(s) SubCutaneous every 12 hours  sodium chloride 0.9%. 1000milliLiter(s) IV Continuous <Continuous>  levothyroxine 100MICROGram(s) Oral daily  lactobacillus acidophilus 1Tablet(s) Oral two times a day with meals  famotidine    Tablet 20milliGRAM(s) Oral daily  tamsulosin 0.4milliGRAM(s) Oral at bedtime  lactulose Syrup 20Gram(s) Oral every other day  fluticasone propionate 50 MICROgram(s)/spray Nasal Spray 1Spray(s) Alternating Nostrils daily  loratadine 10milliGRAM(s) Oral daily  piperacillin/tazobactam IVPB. 3.375Gram(s) IV Intermittent every 8 hours    MEDICATIONS  (PRN):  acetaminophen   Tablet 650milliGRAM(s) Oral every 6 hours PRN For Temp greater than 38 C (100.4 F)  acetaminophen   Tablet. 650milliGRAM(s) Oral every 6 hours PRN Mild Pain (1 - 3)  morphine  - Injectable 2milliGRAM(s) IV Push every 4 hours PRN Moderate Pain (4 - 6)  ondansetron Injectable 4milliGRAM(s) IV Push every 6 hours PRN Nausea and/or Vomiting  acetaminophen  Suppository 650milliGRAM(s) Rectal every 6 hours PRN For Temp greater than 38 C (100.4 F)      OBJECTIVE  PHYSICAL EXAM:  T(C): 37.7, Max: 38.8 ( @ 15:09)  HR: 76 (71 - 86)  BP: 102/69 (102/69 - 149/65)  RR: 16 (15 - 18)  SpO2: 95% (95% - 97%)  Wt(kg): --  I&O's Summary    I & Os for current day (as of 2017 12:40)  =============================================  IN: 800 ml / OUT: 0 ml / NET: 800 ml      Appearance: NAD.   HEENT:   Moist oral mucosa. Conjunctiva clear b/l.   Lymphatic: No cervical lymphadenopathy  Cardiovascular: RRR with no m/r/g.  Respiratory: Lungs CTAB.  Gastrointestinal:  Soft, nontender. No rebound. No rigidity. 	  Skin: No rashes, No ecchymoses, No cyanosis.	  Neurologic: Non-focal. Moving all extremities. Following commands.  Extremities: No edema. No erythema. No calf tenderness. Malvin's negative.  Vascular: DP 2+ b/l.  	  LABS:                        9.8    5.4   )-----------( 81       ( 2017 06:47 )             30.7         143  |  110<H>  |  27<H>  ----------------------------<  102<H>  3.9   |  23  |  1.60<H>    Ca    7.9<L>      2017 06:47    TPro  6.3  /  Alb  2.5<L>  /  TBili  0.5  /  DBili  x   /  AST  18  /  ALT  13  /  AlkPhos  70      PT/INR - ( 2017 06:47 )   PT: 15.5 sec;   INR: 1.41 ratio           Urinalysis Basic - ( 2017 15:18 )    Color: Yellow / Appearance: Turbid / S.010 / pH: x  Gluc: x / Ketone: Trace  / Bili: Negative / Urobili: Negative   Blood: x / Protein: 75 mg/dL / Nitrite: Positive   Leuk Esterase: Moderate / RBC: x / WBC >50   Sq Epi: x / Non Sq Epi: Many / Bacteria: Many        RADIOLOGY & ADDITIONAL TESTS:    ASSESSMENT/PLAN: PROGRESS NOTE    OVERNIGHT - febrile,complains of abdominal /flank pain   Scheduled for stent placement today,but HCP refuses procedure and palliative care eval ordered for cmo /hospice disussion   SUBJECTIVE  Resting in a bed comfortably,confused ,poor mentation   PAST MEDICAL & SURGICAL HISTORY:  Hypothyroidism: newly diagnosed, started on T4 2013  Osteoarthritis  Colon cancer: &gt;30 years ago  Breast CA: &quot;many years ago&quot;  DVT (deep venous thrombosis): left leg not on A/C  HLD (hyperlipidemia)  Dementia  History of hysterectomy  Colostomy in place  Colon perforation  Ileostomy in place    HEALTH ISSUES - PROBLEM Dx:  HLD (hyperlipidemia): HLD (hyperlipidemia)  Colostomy in place: Colostomy in place  Dementia: Dementia  Osteoarthritis: Osteoarthritis  DVT (deep venous thrombosis): DVT (deep venous thrombosis)  Hypothyroidism: Hypothyroidism  Heart murmur: Heart murmur  Nephrolithiasis: Nephrolithiasis  Urinary tract infection with hematuria, site unspecified: Urinary tract infection with hematuria, site unspecified        FAMILY HISTORY:  No pertinent family history in first degree relatives    Allergies    No Known Allergies    Intolerances        MEDICATIONS  (STANDING):  simvastatin 10milliGRAM(s) Oral at bedtime  labetalol 100milliGRAM(s) Oral two times a day  heparin  Injectable 5000Unit(s) SubCutaneous every 12 hours  sodium chloride 0.9%. 1000milliLiter(s) IV Continuous <Continuous>  levothyroxine 100MICROGram(s) Oral daily  lactobacillus acidophilus 1Tablet(s) Oral two times a day with meals  famotidine    Tablet 20milliGRAM(s) Oral daily  tamsulosin 0.4milliGRAM(s) Oral at bedtime  lactulose Syrup 20Gram(s) Oral every other day  fluticasone propionate 50 MICROgram(s)/spray Nasal Spray 1Spray(s) Alternating Nostrils daily  loratadine 10milliGRAM(s) Oral daily  piperacillin/tazobactam IVPB. 3.375Gram(s) IV Intermittent every 8 hours    MEDICATIONS  (PRN):  acetaminophen   Tablet 650milliGRAM(s) Oral every 6 hours PRN For Temp greater than 38 C (100.4 F)  acetaminophen   Tablet. 650milliGRAM(s) Oral every 6 hours PRN Mild Pain (1 - 3)  morphine  - Injectable 2milliGRAM(s) IV Push every 4 hours PRN Moderate Pain (4 - 6)  ondansetron Injectable 4milliGRAM(s) IV Push every 6 hours PRN Nausea and/or Vomiting  acetaminophen  Suppository 650milliGRAM(s) Rectal every 6 hours PRN For Temp greater than 38 C (100.4 F)      OBJECTIVE  PHYSICAL EXAM:  T(C): 37.7, Max: 38.8 (- @ 15:09)  HR: 76 (71 - 86)  BP: 102/69 (102/69 - 149/65)  RR: 16 (15 - 18)  SpO2: 95% (95% - 97%)  Wt(kg): --  I&O's Summary    I & Os for current day (as of 2017 12:40)  =============================================  IN: 800 ml / OUT: 0 ml / NET: 800 ml      Appearance: NAD.   HEENT:   Moist oral mucosa. Conjunctiva clear b/l.   Lymphatic: No cervical lymphadenopathy  Cardiovascular: RRR with no m/r/g.  Respiratory: Lungs CTAB.  Gastrointestinal:  Soft, nontender. No rebound. No rigidity. 	  Skin: No rashes, No ecchymoses, No cyanosis.	  Neurologic: Non-focal. Moving all extremities. Following commands.  Extremities: No edema. No erythema. No calf tenderness. Malvin's negative.  Vascular: DP 2+ b/l.  	  LABS:                        9.8    5.4   )-----------( 81       ( 2017 06:47 )             30.7         143  |  110<H>  |  27<H>  ----------------------------<  102<H>  3.9   |  23  |  1.60<H>    Ca    7.9<L>      2017 06:47    TPro  6.3  /  Alb  2.5<L>  /  TBili  0.5  /  DBili  x   /  AST  18  /  ALT  13  /  AlkPhos  70      PT/INR - ( 2017 06:47 )   PT: 15.5 sec;   INR: 1.41 ratio           Urinalysis Basic - ( 2017 15:18 )    Color: Yellow / Appearance: Turbid / S.010 / pH: x  Gluc: x / Ketone: Trace  / Bili: Negative / Urobili: Negative   Blood: x / Protein: 75 mg/dL / Nitrite: Positive   Leuk Esterase: Moderate / RBC: x / WBC >50   Sq Epi: x / Non Sq Epi: Many / Bacteria: Many        RADIOLOGY & ADDITIONAL TESTS:reviwed electronically     ASSESSMENT/PLAN:

## 2017-04-28 NOTE — PROGRESS NOTE ADULT - PROBLEM SELECTOR PLAN 1
Admit for septic workup and ID evaluation,send blood and urine cx,serial lactate levels,monitor vitals closley,ivfs hydration,monitor urine output and renal profile,iv abx as per id cons  malcolm reuested Admit for septic workup and ID evaluation,send blood and urine cx,serial lactate levels,monitor vitals closley,ivfs hydration,monitor urine output and renal profile,iv abx as per id cons  eval reuested Family refuses surg interventions and request CMO

## 2017-04-28 NOTE — CONSULT NOTE ADULT - ASSESSMENT
Obstructive uropathy secondary to obstructing proximal left calculus, febrile with uti, patient at risk for sptic shock, family member does not want any intervention at present because of patient's overall  status and dementia requests medical therapy

## 2017-04-28 NOTE — PROGRESS NOTE ADULT - ASSESSMENT
Pt is a 89 yo female ,[facility] resident with hx od CAD,HTN,HLD,DVT ,Breast ca,colon ca, s/p colostomy ,OA who presents to the ED with a cc of fever T max of 101.  Pt confused and unable  provide further history at this time Diagnosed with renal colic,found to have moderate to severe hydronephrosis with obstructing kidney stone and  eval called by ER Scheduled for OR in am for stent placement as per Dr Chang, urologist arrangement Admit for septic workup and ID evaluation,send blood and urine cx,serial lactate levels,monitor vitals closley,ivfs hydration,monitor urine output and renal profile,iv abx as per id cons Dr Bolden Palliative care consult requested due to overall poor prognosis,to discuss advance directives and complete MOLST

## 2017-04-28 NOTE — CONSULT NOTE ADULT - SUBJECTIVE AND OBJECTIVE BOX
Patient seen Plan discussed/Questions answered (with patient/ancillary staff/colleagues) Chart notes reviewd More detailed Pulm/Critical Care notes, assessment and plan  will be added later today  (to current note) after reviewing labs problem list   Remarkable Interval history if any elicited is noted Patient seen Plan discussed/Questions answered (with patient/ancillary staff/colleagues) Chart notes reviewd More detailed Pulm/Critical Care notes, assessment and plan  will be added later today  (to current note) after reviewing labs problem list   Remarkable Interval history if any elicited is noted   REVIEW OF SYMPTOMS    Able to give ROS  Yes     RELIABLE NO   Weakness Yes   Chills No   Vision changes No  Lymph nodes No enlarged glands   Endocrine No unexplained hair loss No het or cold intolerance   Allergy No hives   Sore throat No   Coughing blood No  Headache No  Confusion YES  Chest pain No  Palpitations No   Pain abdomen NO   Shortness of breath YES  Vomiting NO  Pain neck No  Pain abdomen NO     PHYSICAL EXAM    HEENT Unremarkable PERRLA atraumatic  RESP Fair air entry EXP prolonged    Harsh breath sound Resp distres mild  CARDIAC S1 S2 No S3     NO JVD   Awake Alert and ORIENTED x two  ABDOMEN SOFT BS PRESENT NOT DISTENDED No hepatosplenomegaly  PEDAL EDEMA present No calf tenderness  NO rash GENERAL Not TOXIC looking  .   SELMA PHILIPPE [address] [facility] [phone] 2/5/1927 DNR ALLERGY nka    CONTACT Anahi Vivar  self [phone]   REASON FOR VISIT   Initial evaluation/Pulmonary consultation requested 4/28/2017 by Dr Willson from Dr Solo     Patient examined chart reviewed  HOSPITAL ADMISSION [facility] Dr Willson   PATIENT CAME  FROM (if information available)      PAST MEDICAL & SURGICAL HISTORY:  Hypothyroidism: newly diagnosed, started on T4 4/2013  Osteoarthritis  Colon cancer: &gt;30 years ago  Breast CA:   DVT (deep venous thrombosis): left leg not on A/C  HLD (hyperlipidemia)  Dementia  History of hysterectomy  Colostomy in place  Colon perforation  Ileostomy in place  PATIENT DESCRIPTION  89 yo female ,[facility] resident with hx od CAD,HTN,HLD,DVT ,Breast ca,colon ca, s/p colostomy ,OA who presents to the ED with a cc of fever T max of 101.  Pt confused and unable  provide further history at this time Diagnosed with renal colic,found to have moderate to severe hydronephrosis with obstructing kidney stone and  eval called by ER.  Admited [facility] 4/27/2017  for septic workup and ID evaluation,send blood and urine cx,serial lactate levels,monitor vitals closley,ivfs hydration,monitor urine output and renal profile,iv abx as per id cons Dr Bolden Palliative care consult requested due to overall poor prognosis,to discuss advance directives and complete MOLST Pulm consulted 4/27 because of hypoxia and abn CXR and HO DVT  RECENT EVENTS   4/28/2017 89 f admitted 4/27 with L obstr hydro sec renal stones but family declined invasive intervention Pt on zosyn (4/27) Pulm consulted 4/28 as she was hypoxic and has is opac on CXR Cont zosyn IVF for ROSEMARIE Has anemia likely in part sec IVF and thrombocytopenia probably from sepsis CMO or hospice would be approptiate   VITALS/LABS  4/28/2017 Tmax 1029 99 106/60 16 95%  4/28/2017 w 5.4 Hb 9.8 Plt 81  Na 143 K 3.9 CO2 23 Cr 1.6

## 2017-04-28 NOTE — CONSULT NOTE ADULT - SUBJECTIVE AND OBJECTIVE BOX
HPI:  Pt is a 89 yo female, [facility] resident with hx of CAD, HTN, UTIs, left hydronephrosis with renal calculi, HLD, DVT, Breast ca, colon ca, s/p colostomy, OA who presents to the ED with a cc of fever T max of 101. She was diagnosed with nephrolithiasis, found to have moderate to severe hydronephrosis with obstructing kidney stone.  malcolm was called by ER Scheduled for OR in am for stent placement as per Dr Chang, urologist arrangement Admit for septic workup and ID evaluation,send blood and urine cx,serial lactate levels,monitor vitals closley,ivfs hydration,monitor urine output and renal profile,iv abx as per id cons Dr Bolden Palliative care consult requested due to overall poor prognosis,to discuss advance directives and complete MOLST (2017 20:30)      PAST MEDICAL & SURGICAL HISTORY:  Hypothyroidism: newly diagnosed, started on T4 2013  Osteoarthritis  Colon cancer: &gt;30 years ago  Breast CA: &quot;many years ago&quot;  DVT (deep venous thrombosis): left leg not on A/C  HLD (hyperlipidemia)  Dementia  History of hysterectomy  Colostomy in place  Colon perforation  Ileostomy in place       SOCIAL HISTORY:                                     Admitted from:  home  [facility]       FAMILY HISTORY:  No pertinent family history in first degree relatives      MEDICATIONS  (STANDING):  simvastatin 10milliGRAM(s) Oral at bedtime  labetalol 100milliGRAM(s) Oral two times a day  heparin  Injectable 5000Unit(s) SubCutaneous every 12 hours  sodium chloride 0.9%. 1000milliLiter(s) IV Continuous <Continuous>  levothyroxine 100MICROGram(s) Oral daily  lactobacillus acidophilus 1Tablet(s) Oral two times a day with meals  famotidine    Tablet 20milliGRAM(s) Oral daily  tamsulosin 0.4milliGRAM(s) Oral at bedtime  lactulose Syrup 20Gram(s) Oral every other day  fluticasone propionate 50 MICROgram(s)/spray Nasal Spray 1Spray(s) Alternating Nostrils daily  loratadine 10milliGRAM(s) Oral daily  piperacillin/tazobactam IVPB. 3.375Gram(s) IV Intermittent every 8 hours    MEDICATIONS  (PRN):  acetaminophen   Tablet 650milliGRAM(s) Oral every 6 hours PRN For Temp greater than 38 C (100.4 F)  acetaminophen   Tablet. 650milliGRAM(s) Oral every 6 hours PRN Mild Pain (1 - 3)  morphine  - Injectable 2milliGRAM(s) IV Push every 4 hours PRN Moderate Pain (4 - 6)  ondansetron Injectable 4milliGRAM(s) IV Push every 6 hours PRN Nausea and/or Vomiting  acetaminophen  Suppository 650milliGRAM(s) Rectal every 6 hours PRN For Temp greater than 38 C (100.4 F)      Allergies    No Known Allergies    Intolerances          ADVANCE DIRECTIVES:  [ ] YES [ ] NO   DNR [ ] YES [ ] NO  Completed on:                     MOLST  [ ] YES [ ] NO   Completed on:  Living Will  [ ] YES [ ] NO   Completed on:    Surrogate/HCP/Guardian: [ ] YES [ ] NO                                Phone#:      Baseline ADLs (prior to admission):  Independent [ ] moderately [ ] fully   Dependent   [ ] moderately [ ]fully    Karnofsky Performance Score/Palliative Performance Status Version 2:         %      Review of Systems: Reviewed                     Negative:                     Positive:  Unable to obtain due to poor mentation   All others negative    Dyspnea: 0 1 2 3   Nausea/Vomiting: Yes No  Anxiety:  Yes No  Depression: Yes No  Fatigue: Yes No  Loss of appetite: Yes No    Pain:             Character-            Duration-            Factors-            Frequency-            Location-            Severity-    Vital Signs Last 24 Hrs  T(C): 38.1, Max: 39.4 (- @ 12:00)  T(F): 100.6, Max: 102.9 (- @ 12:00)  HR: 92 (71 - 99)  BP: 106/64 (102/69 - 142/80)  BP(mean): --  RR: 17 (15 - 18)  SpO2: 92% (92% - 96%)      General: alert  oriented x ____ lethargic agitated                  cachexia  nonverbal  coma                  ill appearing    HEENT: normal  dry mouth  ET tube/trach                   Lungs: comfortable tachypnea/labored breathing  excessive secretions               CTA B/L   +wheezing    +ronchi    +crackles     CV: normal  tachycardia    GI: normal,  soft,  NT,  BS+   distended  tender  no BS               PEG/NG/OG tube  constipation   diarrhea    : normal  incontinent      MSK: normal  weakness  edema             ambulatory  bedbound/wheelchair bound    Skin: normal  pressure ulcers- Stage_____  no rash        LABS:                        9.8    5.4   )-----------( 81       ( 2017 06:47 )             30.7         143  |  110<H>  |  27<H>  ----------------------------<  102<H>  3.9   |  23  |  1.60<H>    Ca    7.9<L>      2017 06:47    TPro  6.3  /  Alb  2.5<L>  /  TBili  0.5  /  DBili  x   /  AST  18  /  ALT  13  /  AlkPhos  70      PT/INR - ( 2017 06:47 )   PT: 15.5 sec;   INR: 1.41 ratio           Urinalysis Basic - ( 2017 15:18 )    Color: Yellow / Appearance: Turbid / S.010 / pH: x  Gluc: x / Ketone: Trace  / Bili: Negative / Urobili: Negative   Blood: x / Protein: 75 mg/dL / Nitrite: Positive   Leuk Esterase: Moderate / RBC: x / WBC >50   Sq Epi: x / Non Sq Epi: Many / Bacteria: Many      I&O's Summary  I & Os for 24h ending 2017 07:00  =============================================  IN: 800 ml / OUT: 0 ml / NET: 800 ml    I & Os for current day (as of 2017 19:16)  =============================================  IN: 1120 ml / OUT: 302 ml / NET: 818 ml      RADIOLOGY & ADDITIONAL STUDIES: HPI:  Pt is a 89 yo female, [facility] resident with hx of CAD, HTN, UTIs, left hydronephrosis with renal calculi, HLD, DVT, Breast ca, colon ca, s/p colostomy, OA who presents to the ED with a cc of fever T max of 101. She was diagnosed with nephrolithiasis, found to have moderate to severe hydronephrosis with obstructing kidney stone. /ID consult was called for possible stent placement and iv abx.    Patient's niece is her HCP. Patient had no children. Patient was lived and was a  in [place] her whole life. She moved out to  a couple of years ago. Patient is wheelchair bound in her PAR[place]. She needs help with most of her ADLs. She is able to eat with her left hand. Her normal state is pleasant confusion, mostly talking about her younger years.       PAST MEDICAL & SURGICAL HISTORY:  Hypothyroidism: newly diagnosed, started on T4 2013  Osteoarthritis  Colon cancer: &gt;30 years ago  Breast CA: &quot;many years ago&quot;  DVT (deep venous thrombosis): left leg not on A/C  HLD (hyperlipidemia)  Dementia  History of hysterectomy  Colostomy in place  Colon perforation  Ileostomy in place       SOCIAL HISTORY:                                     Admitted from:  [facility]       FAMILY HISTORY:  No pertinent family history in first degree relatives      MEDICATIONS  (STANDING):  simvastatin 10milliGRAM(s) Oral at bedtime  labetalol 100milliGRAM(s) Oral two times a day  heparin  Injectable 5000Unit(s) SubCutaneous every 12 hours  sodium chloride 0.9%. 1000milliLiter(s) IV Continuous <Continuous>  levothyroxine 100MICROGram(s) Oral daily  lactobacillus acidophilus 1Tablet(s) Oral two times a day with meals  famotidine    Tablet 20milliGRAM(s) Oral daily  tamsulosin 0.4milliGRAM(s) Oral at bedtime  lactulose Syrup 20Gram(s) Oral every other day  fluticasone propionate 50 MICROgram(s)/spray Nasal Spray 1Spray(s) Alternating Nostrils daily  loratadine 10milliGRAM(s) Oral daily  piperacillin/tazobactam IVPB. 3.375Gram(s) IV Intermittent every 8 hours    MEDICATIONS  (PRN):  acetaminophen   Tablet 650milliGRAM(s) Oral every 6 hours PRN For Temp greater than 38 C (100.4 F)  acetaminophen   Tablet. 650milliGRAM(s) Oral every 6 hours PRN Mild Pain (1 - 3)  morphine  - Injectable 2milliGRAM(s) IV Push every 4 hours PRN Moderate Pain (4 - 6)  ondansetron Injectable 4milliGRAM(s) IV Push every 6 hours PRN Nausea and/or Vomiting  acetaminophen  Suppository 650milliGRAM(s) Rectal every 6 hours PRN For Temp greater than 38 C (100.4 F)      Allergies    No Known Allergies    Intolerances          ADVANCE DIRECTIVES:  [x ] YES [ ] NO   DNR [ x] YES [ ] NO  Completed on:                     MOLST  [x ] YES [ ] NO   Completed on:  Living Will  [ x] YES [ ] NO   Completed on:    Surrogate/HCP/Guardian: [ x] YES [ ] NO  Anahi Rupambnathalie (niece)                                    Baseline ADLs (prior to admission):  Independent [ ] moderately [ ] fully   Dependent   [x moderately [ ]fully    Karnofsky Performance Score/Palliative Performance Status Version 2:       50%  Mainly Sits/Lays   Unable to do any work  Extensive disease  Considerable assistance required  Normal or reduced intake  Conscious but pleasantly confused (baseline)      Review of Systems: Unable to obtain due to poor mentation       Dyspnea: 0    Nausea/Vomiting:  No  Anxiety:   No  Depression:  No  Fatigue:  No  Loss of appetite: No    Pain: none              Vital Signs Last 24 Hrs  T(C): 38.1, Max: 39.4 ( @ 12:00)  T(F): 100.6, Max: 102.9 ( @ 12:00)  HR: 92 (71 - 99)  BP: 106/64 (102/69 - 142/80)  BP(mean): --  RR: 17 (15 - 18)  SpO2: 92% (92% - 96%)      General: alert  oriented x _0___ , delirius    HEENT: normal                   Lungs:              CTA B/L       CV: S1S2 RRR    GI: soft,  NT,  BS+   + colostomy    :  incontinent      MSK: wheelchair bound, 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    Skin:  no rash        LABS:                        9.8    5.4   )-----------( 81       ( 2017 06:47 )             30.7         143  |  110<H>  |  27<H>  ----------------------------<  102<H>  3.9   |  23  |  1.60<H>    Ca    7.9<L>      2017 06:47    TPro  6.3  /  Alb  2.5<L>  /  TBili  0.5  /  DBili  x   /  AST  18  /  ALT  13  /  AlkPhos  70      PT/INR - ( 2017 06:47 )   PT: 15.5 sec;   INR: 1.41 ratio           Urinalysis Basic - ( 2017 15:18 )    Color: Yellow / Appearance: Turbid / S.010 / pH: x  Gluc: x / Ketone: Trace  / Bili: Negative / Urobili: Negative   Blood: x / Protein: 75 mg/dL / Nitrite: Positive   Leuk Esterase: Moderate / RBC: x / WBC >50   Sq Epi: x / Non Sq Epi: Many / Bacteria: Many      I&O's Summary  I & Os for 24h ending 2017 07:00  =============================================  IN: 800 ml / OUT: 0 ml / NET: 800 ml    I & Os for current day (as of 2017 19:16)  =============================================  IN: 1120 ml / OUT: 302 ml / NET: 818 ml      RADIOLOGY & ADDITIONAL STUDIES:      EXAM:  CT ABDOMEN AND PELVIS                            PROCEDURE DATE:  2017        INTERPRETATION:  History: Fever, UTI.    Noncontrast CT abdomen and pelvis. Prior dated 2017.  Increase in the bibasilar congestion and small effusions.  Multiple left renal pelvic calculi, largest measuring up to 1.7 cm   similar to prior. There is interval increase in left hydronephrosis.   Severe obstructive left hydronephrosis at this time with perirenal   stranding. Correlate for concomitant infection. Study is otherwise   unchanged. Large right lower quadrant ostomy hernia containing   nonobstructive large and small bowel. No acute bowel inflammation.   Moderate rectal fecal impaction. No extraluminal fluid or gas.  Bladder not adequately distended.  No acute skeletal abnormality.    Impression: Marked obstructive left hydronephrosis secondary to multiple   left renal pelvic calculi              BETTINA KELLEY M.D., ATTENDING RADIOLOGIST  This document has been electronically signed. 2017  4:48PM

## 2017-04-28 NOTE — PROGRESS NOTE ADULT - PROBLEM SELECTOR PLAN 2
stent placement in am as per  cons stent placement in am as per  cons,hcp requests symptomatic management only and declines invasive tx We had a meeting at bedside with PC consult and HCP ,Dr Chang is aware and spoke to hcp Anahi as well

## 2017-04-28 NOTE — CONSULT NOTE ADULT - SUBJECTIVE AND OBJECTIVE BOX
CHIEF COMPLAINT:   Pt is a 89 yo female ,[facility] resident with hx od CAD,HTN,HLD,DVT ,Breast ca,colon ca, s/p colostomy ,OA who presents to the ED with a cc of fever T max of 101.  Pt confused and unable  provide further history at this time Diagnosed with renal colic,found to have moderate to severe hydronephrosis with obstructing kidney stone and  eval called by ER.  Admited for septic workup and ID evaluation,send blood and urine cx,serial lactate levels,monitor vitals closley,ivfs hydration,monitor urine output and renal profile,iv abx as per id cons Dr Bolden Palliative care consult requested due to overall poor prognosis,to discuss advance directives and complete MOLST    Discvussed patient's condition in detail with niece who is health care proxy (Anahi Vivar) who did not want any intervention with cysto/stent or percutaneous nephrostomy, rather she requested medical management at this time,    PAST MEDICAL & SURGICAL HISTORY:  Hypothyroidism: newly diagnosed, started on T4 2013  Osteoarthritis  Colon cancer: &gt;30 years ago  Breast CA:   DVT (deep venous thrombosis): left leg not on A/C  HLD (hyperlipidemia)  Dementia  History of hysterectomy  Colostomy in place  Colon perforation  Ileostomy in place      REVIEW OF SYSTEMS:    CONSTITUTIONAL: , fevers or chills    NECK: No pain or stiffness  RESPIRATORY: No cough, wheezing, hemoptysis; No shortness of breath  CARDIOVASCULAR: No chest pain or palpitations  GASTROINTESTINAL: No abdominal or epigastric pain. No nausea, vomiting, or hematemesis; ileostomy  GENITOURINARY: ,incontinent  NEUROLOGICAL: weak frail  SKIN: No itching, burning, rashes, or lesions       MEDICATIONS  (STANDING):  simvastatin 10milliGRAM(s) Oral at bedtime  labetalol 100milliGRAM(s) Oral two times a day  heparin  Injectable 5000Unit(s) SubCutaneous every 12 hours  sodium chloride 0.9%. 1000milliLiter(s) IV Continuous <Continuous>  levothyroxine 100MICROGram(s) Oral daily  lactobacillus acidophilus 1Tablet(s) Oral two times a day with meals  famotidine    Tablet 20milliGRAM(s) Oral daily  tamsulosin 0.4milliGRAM(s) Oral at bedtime  lactulose Syrup 20Gram(s) Oral every other day  fluticasone propionate 50 MICROgram(s)/spray Nasal Spray 1Spray(s) Alternating Nostrils daily  loratadine 10milliGRAM(s) Oral daily  piperacillin/tazobactam IVPB. 3.375Gram(s) IV Intermittent every 8 hours    MEDICATIONS  (PRN):  acetaminophen   Tablet 650milliGRAM(s) Oral every 6 hours PRN For Temp greater than 38 C (100.4 F)  acetaminophen   Tablet. 650milliGRAM(s) Oral every 6 hours PRN Mild Pain (1 - 3)  morphine  - Injectable 2milliGRAM(s) IV Push every 4 hours PRN Moderate Pain (4 - 6)  ondansetron Injectable 4milliGRAM(s) IV Push every 6 hours PRN Nausea and/or Vomiting  acetaminophen  Suppository 650milliGRAM(s) Rectal every 6 hours PRN For Temp greater than 38 C (100.4 F)      Allergies    No Known Allergies    Intolerances        SOCIAL HISTORY:    FAMILY HISTORY:  No pertinent family history in first degree relatives      Vital Signs Last 24 Hrs  T(C): 39.4, Max: 39.4 ( @ 12:00)  T(F): 102.9, Max: 102.9 (- @ 12:00)  HR: 99 (71 - 99)  BP: 142/80 (102/69 - 149/65)  BP(mean): --  RR: 16 (15 - 18)  SpO2: 95% (95% - 97%)    PHYSICAL EXAM:    Constitutional: NAD, well-developed  HEENT: MAMTA, EOMI, Normal Hearing, MMM  Neck: No LAD, No JVD  Back: Normal spine flexure, No CVA tenderness  Respiratory: CTAB   Cardiovascular: S1 and S2, RRR, no M/G/R  Abd: BS+, soft, NT/ND, No CVAT, colostomy  Extremities: No peripheral edema  Vascular: 2+ peripheral pulses  Neurological: dementia  Psychiatric: non-combative  Musculoskeletal: 5/5 strength b/l upper and lower extremities  Skin: No rashes    LABS:                        9.8    5.4   )-----------( 81       ( 2017 06:47 )             30.7     -    143  |  110<H>  |  27<H>  ----------------------------<  102<H>  3.9   |  23  |  1.60<H>    Ca    7.9<L>      2017 06:47    TPro  6.3  /  Alb  2.5<L>  /  TBili  0.5  /  DBili  x   /  AST  18  /  ALT  13  /  AlkPhos  70      PT/INR - ( 2017 06:47 )   PT: 15.5 sec;   INR: 1.41 ratio           Urinalysis Basic - ( 2017 15:18 )    Color: Yellow / Appearance: Turbid / S.010 / pH: x  Gluc: x / Ketone: Trace  / Bili: Negative / Urobili: Negative   Blood: x / Protein: 75 mg/dL / Nitrite: Positive   Leuk Esterase: Moderate / RBC: x / WBC >50   Sq Epi: x / Non Sq Epi: Many / Bacteria: Many      Urine Culture:   Hemoglobin: 9.8 g/dL ( @ 06:47)  Hematocrit: 30.7 % ( @ 06:47)  Hemoglobin: 11.2 g/dL ( @ 15:18)  Hematocrit: 34.4 % ( @ 15:18)  EXAM:  CT ABDOMEN AND PELVIS                                          RADIOLOGY & ADDITIONAL STUDIES:  EXAM:  CT ABDOMEN AND PELVIS                            PROCEDURE DATE:  2017        INTERPRETATION:  History: Fever, UTI.    Noncontrast CT abdomen and pelvis. Prior dated 2017.  Increase in the bibasilar congestion and small effusions.  Multiple left renal pelvic calculi, largest measuring up to 1.7 cm   similar to prior. There is interval increase in left hydronephrosis.   Severe obstructive left hydronephrosis at this time with perirenal   stranding. Correlate for concomitant infection. Study is otherwise   unchanged. Large right lower quadrant ostomy hernia containing   nonobstructive large and small bowel. No acute bowel inflammation.   Moderate rectal fecal impaction. No extraluminal fluid or gas.  Bladder not adequately distended.  No acute skeletal abnormality.    Impression: Marked obstructive left hydronephrosis secondary to multiple   left renal pelvic calculi

## 2017-04-28 NOTE — PROGRESS NOTE ADULT - SUBJECTIVE AND OBJECTIVE BOX
Interval History:    CENTRAL LINE:   [  ] YES       [  ] NO  MCKEE:                 [  ] YES       [  ] NO         REVIEW OF SYSTEMS:  All Systems below were reviewed and are negative [  ]  HEENT:  ID:  Pulmonary:  Cardiac:  GI:  Renal:  Musculoskeletal:  All other systems above were reviewed and are negative   [  ]      MEDICATIONS  (STANDING):  simvastatin 10milliGRAM(s) Oral at bedtime  labetalol 100milliGRAM(s) Oral two times a day  heparin  Injectable 5000Unit(s) SubCutaneous every 12 hours  sodium chloride 0.9%. 1000milliLiter(s) IV Continuous <Continuous>  levothyroxine 100MICROGram(s) Oral daily  lactobacillus acidophilus 1Tablet(s) Oral two times a day with meals  famotidine    Tablet 20milliGRAM(s) Oral daily  tamsulosin 0.4milliGRAM(s) Oral at bedtime  lactulose Syrup 20Gram(s) Oral every other day  fluticasone propionate 50 MICROgram(s)/spray Nasal Spray 1Spray(s) Alternating Nostrils daily  loratadine 10milliGRAM(s) Oral daily  piperacillin/tazobactam IVPB. 3.375Gram(s) IV Intermittent every 8 hours    MEDICATIONS  (PRN):  acetaminophen   Tablet 650milliGRAM(s) Oral every 6 hours PRN For Temp greater than 38 C (100.4 F)  acetaminophen   Tablet. 650milliGRAM(s) Oral every 6 hours PRN Mild Pain (1 - 3)  morphine  - Injectable 2milliGRAM(s) IV Push every 4 hours PRN Moderate Pain (4 - 6)  ondansetron Injectable 4milliGRAM(s) IV Push every 6 hours PRN Nausea and/or Vomiting  acetaminophen  Suppository 650milliGRAM(s) Rectal every 6 hours PRN For Temp greater than 38 C (100.4 F)      Vital Signs Last 24 Hrs  T(C): 36.8, Max: 39.4 (04-28 @ 12:00)  T(F): 98.3, Max: 102.9 (04-28 @ 12:00)  HR: 85 (71 - 99)  BP: 138/82 (102/69 - 142/80)  BP(mean): --  RR: 16 (16 - 18)  SpO2: 100% (92% - 100%)    I&O's Summary  I & Os for 24h ending 28 Apr 2017 07:00  =============================================  IN: 800 ml / OUT: 0 ml / NET: 800 ml    I & Os for current day (as of 28 Apr 2017 21:56)  =============================================  IN: 1120 ml / OUT: 302 ml / NET: 818 ml      PHYSICAL EXAM:  HEENT: NC/AT; PERRLA  Neck: Soft; no tenderness  Lungs: CTA bilaterally; no wheezing.   Heart:  Abdomen:  Genital/ Rectal:  Extremities:  Neurologic:  Vascular:      LABORATORY:    CBC Full  -  ( 28 Apr 2017 06:47 )  WBC Count : 5.4 K/uL  Hemoglobin : 9.8 g/dL  Hematocrit : 30.7 %  Platelet Count - Automated : 81 K/uL  Mean Cell Volume : 89.2 fl  Mean Cell Hemoglobin : 28.6 pg  Mean Cell Hemoglobin Concentration : 32.0 gm/dL  Auto Neutrophil # : 4.4 K/uL  Auto Lymphocyte # : 0.3 K/uL  Auto Monocyte # : 0.6 K/uL  Auto Eosinophil # : 0.1 K/uL  Auto Basophil # : 0.0 K/uL  Auto Neutrophil % : 81.6 %  Auto Lymphocyte % : 5.5 %  Auto Monocyte % : 10.9 %  Auto Eosinophil % : 1.4 %  Auto Basophil % : 0.6 %          04-28    143  |  110<H>  |  27<H>  ----------------------------<  102<H>  3.9   |  23  |  1.60<H>    Ca    7.9<L>      28 Apr 2017 06:47    TPro  6.3  /  Alb  2.5<L>  /  TBili  0.5  /  DBili  x   /  AST  18  /  ALT  13  /  AlkPhos  70  04-28          Assessment and Plan:          Pranay Bolden MD   [phone]. She is more responsive today. No fevers noted.  Comfortable.      MEDICATIONS  (STANDING):  simvastatin 10milliGRAM(s) Oral at bedtime  labetalol 100milliGRAM(s) Oral two times a day  heparin  Injectable 5000Unit(s) SubCutaneous every 12 hours  sodium chloride 0.9%. 1000milliLiter(s) IV Continuous <Continuous>  levothyroxine 100MICROGram(s) Oral daily  lactobacillus acidophilus 1Tablet(s) Oral two times a day with meals  famotidine    Tablet 20milliGRAM(s) Oral daily  tamsulosin 0.4milliGRAM(s) Oral at bedtime  lactulose Syrup 20Gram(s) Oral every other day  fluticasone propionate 50 MICROgram(s)/spray Nasal Spray 1Spray(s) Alternating Nostrils daily  loratadine 10milliGRAM(s) Oral daily  piperacillin/tazobactam IVPB. 3.375Gram(s) IV Intermittent every 8 hours    MEDICATIONS  (PRN):  acetaminophen   Tablet 650milliGRAM(s) Oral every 6 hours PRN For Temp greater than 38 C (100.4 F)  acetaminophen   Tablet. 650milliGRAM(s) Oral every 6 hours PRN Mild Pain (1 - 3)  morphine  - Injectable 2milliGRAM(s) IV Push every 4 hours PRN Moderate Pain (4 - 6)  ondansetron Injectable 4milliGRAM(s) IV Push every 6 hours PRN Nausea and/or Vomiting  acetaminophen  Suppository 650milliGRAM(s) Rectal every 6 hours PRN For Temp greater than 38 C (100.4 F)      Vital Signs Last 24 Hrs  T(C): 36.8, Max: 39.4 (04-28 @ 12:00)  T(F): 98.3, Max: 102.9 (04-28 @ 12:00)  HR: 85 (71 - 99)  BP: 138/82 (102/69 - 142/80)  BP(mean): --  RR: 16 (16 - 18)  SpO2: 100% (92% - 100%)    I&O's Summary  I & Os for 24h ending 28 Apr 2017 07:00  =============================================  IN: 800 ml / OUT: 0 ml / NET: 800 ml    I & Os for current day (as of 28 Apr 2017 21:56)  =============================================  IN: 1120 ml / OUT: 302 ml / NET: 818 ml      PHYSICAL EXAM:  HEENT: NC/AT; PERRLA  Neck: Soft; no tenderness  Lungs: CTA bilaterally; no wheezing.   Heart: RRR; no murmurs.  Abdomen: Soft; no masses.  Extremities: No ulcers or swelling.  Neurologic: Awake.        LABORATORY:    CBC Full  -  ( 28 Apr 2017 06:47 )  WBC Count : 5.4 K/uL  Hemoglobin : 9.8 g/dL  Hematocrit : 30.7 %  Platelet Count - Automated : 81 K/uL  Mean Cell Volume : 89.2 fl  Mean Cell Hemoglobin : 28.6 pg  Mean Cell Hemoglobin Concentration : 32.0 gm/dL  Auto Neutrophil # : 4.4 K/uL  Auto Lymphocyte # : 0.3 K/uL  Auto Monocyte # : 0.6 K/uL  Auto Eosinophil # : 0.1 K/uL  Auto Basophil # : 0.0 K/uL  Auto Neutrophil % : 81.6 %  Auto Lymphocyte % : 5.5 %  Auto Monocyte % : 10.9 %  Auto Eosinophil % : 1.4 %  Auto Basophil % : 0.6 %          04-28    143  |  110<H>  |  27<H>  ----------------------------<  102<H>  3.9   |  23  |  1.60<H>    Ca    7.9<L>      28 Apr 2017 06:47    TPro  6.3  /  Alb  2.5<L>  /  TBili  0.5  /  DBili  x   /  AST  18  /  ALT  13  /  AlkPhos  70  04-28          Assessment and Plan:    1. UTI   2. Marked left hydronephrosis.  3. Obstructed left renal stones.    . Continue IV Zosyn.  . Follow all cultures.  . Urology evaluation.          Pranay Bolden MD   [phone].

## 2017-04-28 NOTE — PROGRESS NOTE ADULT - ASSESSMENT
The patient is a 89 year old female with a history of HL, DVT, breast cancer, colon cancer s/p colostomy, hypothyroidism, dementia who is admitted with urosepsis.    Plan:  - Continue labetalol 100 mg bid with holding parameters  - Antibiotics and IVF for UTI  -  evaluation for obstructing stone  - If plan for urologic procedure, patient is optimized to proceed without additional cardiac testing

## 2017-04-28 NOTE — PROGRESS NOTE ADULT - SUBJECTIVE AND OBJECTIVE BOX
Chief Complaint: AMS, fever    Interval Events: No significant events overnight.    Review of Systems:  General: No fevers, chills, weight loss or gain  Skin: No rashes, color changes  Cardiovascular: No chest pain, orthopnea  Respiratory: No shortness of breath, cough  Gastrointestinal: No nausea, abdominal pain  Genitourinary: No incontinence, pain with urination  Musculoskeletal: No pain, swelling, decreased range of motion  Neurological: No headache, weakness  Psychiatric: No depression, anxiety  Endocrine: No weight loss or gain, increased thirst  All other systems are comprehensively negative.    Physical Exam:  Vitals:        Vital Signs Last 24 Hrs  T(C): 39.4, Max: 39.4 (04-28 @ 12:00)  T(F): 102.9, Max: 102.9 (04-28 @ 12:00)  HR: 99 (71 - 99)  BP: 142/80 (102/69 - 149/65)  BP(mean): --  RR: 16 (15 - 18)  SpO2: 95% (95% - 97%)  General: NAD  Neck: No JVD, no carotid bruit  Lungs: CTAB  CV: RRR, nl S1/S2, no M/R/G  Abdomen: S/NT/ND, +BS  Extremities: No LE edema, no cyanosis    Labs:                        9.8    5.4   )-----------( 81       ( 28 Apr 2017 06:47 )             30.7     04-28    143  |  110<H>  |  27<H>  ----------------------------<  102<H>  3.9   |  23  |  1.60<H>    Ca    7.9<L>      28 Apr 2017 06:47    TPro  6.3  /  Alb  2.5<L>  /  TBili  0.5  /  DBili  x   /  AST  18  /  ALT  13  /  AlkPhos  70  04-28        PT/INR - ( 28 Apr 2017 06:47 )   PT: 15.5 sec;   INR: 1.41 ratio

## 2017-04-28 NOTE — GOALS OF CARE CONVERSATION - PERSONAL ADVANCE DIRECTIVE - CONVERSATION DETAILS
spoke to Anahi bahena, hcp on phone. verified hcp and molst , no change in plan of care. wants pt treated.

## 2017-04-29 LAB
-  AMIKACIN: SIGNIFICANT CHANGE UP
-  AMPICILLIN/SULBACTAM: SIGNIFICANT CHANGE UP
-  AMPICILLIN: SIGNIFICANT CHANGE UP
-  AZTREONAM: SIGNIFICANT CHANGE UP
-  CEFAZOLIN: SIGNIFICANT CHANGE UP
-  CEFEPIME: SIGNIFICANT CHANGE UP
-  CEFOXITIN: SIGNIFICANT CHANGE UP
-  CEFTAZIDIME: SIGNIFICANT CHANGE UP
-  CEFTRIAXONE: SIGNIFICANT CHANGE UP
-  CIPROFLOXACIN: SIGNIFICANT CHANGE UP
-  ERTAPENEM: SIGNIFICANT CHANGE UP
-  GENTAMICIN: SIGNIFICANT CHANGE UP
-  LEVOFLOXACIN: SIGNIFICANT CHANGE UP
-  MEROPENEM: SIGNIFICANT CHANGE UP
-  NITROFURANTOIN: SIGNIFICANT CHANGE UP
-  PIPERACILLIN/TAZOBACTAM: SIGNIFICANT CHANGE UP
-  TOBRAMYCIN: SIGNIFICANT CHANGE UP
-  TRIMETHOPRIM/SULFAMETHOXAZOLE: SIGNIFICANT CHANGE UP
ANION GAP SERPL CALC-SCNC: 13 MMOL/L — SIGNIFICANT CHANGE UP (ref 5–17)
BUN SERPL-MCNC: 21 MG/DL — SIGNIFICANT CHANGE UP (ref 7–23)
CALCIUM SERPL-MCNC: 7.9 MG/DL — LOW (ref 8.5–10.1)
CHLORIDE SERPL-SCNC: 113 MMOL/L — HIGH (ref 96–108)
CO2 SERPL-SCNC: 17 MMOL/L — LOW (ref 22–31)
CREAT SERPL-MCNC: 1.6 MG/DL — HIGH (ref 0.5–1.3)
CULTURE RESULTS: SIGNIFICANT CHANGE UP
GLUCOSE SERPL-MCNC: 119 MG/DL — HIGH (ref 70–99)
GRAM STN FLD: SIGNIFICANT CHANGE UP
HCT VFR BLD CALC: 30.1 % — LOW (ref 34.5–45)
HGB BLD-MCNC: 9.2 G/DL — LOW (ref 11.5–15.5)
MCHC RBC-ENTMCNC: 27.2 PG — SIGNIFICANT CHANGE UP (ref 27–34)
MCHC RBC-ENTMCNC: 30.6 GM/DL — LOW (ref 32–36)
MCV RBC AUTO: 89 FL — SIGNIFICANT CHANGE UP (ref 80–100)
METHOD TYPE: SIGNIFICANT CHANGE UP
ORGANISM # SPEC MICROSCOPIC CNT: SIGNIFICANT CHANGE UP
ORGANISM # SPEC MICROSCOPIC CNT: SIGNIFICANT CHANGE UP
PLATELET # BLD AUTO: 70 K/UL — LOW (ref 150–400)
POTASSIUM SERPL-MCNC: 3.6 MMOL/L — SIGNIFICANT CHANGE UP (ref 3.5–5.3)
POTASSIUM SERPL-SCNC: 3.6 MMOL/L — SIGNIFICANT CHANGE UP (ref 3.5–5.3)
RBC # BLD: 3.38 M/UL — LOW (ref 3.8–5.2)
RBC # FLD: 13.8 % — SIGNIFICANT CHANGE UP (ref 10.3–14.5)
SODIUM SERPL-SCNC: 143 MMOL/L — SIGNIFICANT CHANGE UP (ref 135–145)
SPECIMEN SOURCE: SIGNIFICANT CHANGE UP
WBC # BLD: 7.9 K/UL — SIGNIFICANT CHANGE UP (ref 3.8–10.5)
WBC # FLD AUTO: 7.9 K/UL — SIGNIFICANT CHANGE UP (ref 3.8–10.5)

## 2017-04-29 RX ORDER — VANCOMYCIN HCL 1 G
1000 VIAL (EA) INTRAVENOUS ONCE
Qty: 0 | Refills: 0 | Status: COMPLETED | OUTPATIENT
Start: 2017-04-29 | End: 2017-04-29

## 2017-04-29 RX ORDER — VANCOMYCIN HCL 1 G
VIAL (EA) INTRAVENOUS
Qty: 0 | Refills: 0 | Status: DISCONTINUED | OUTPATIENT
Start: 2017-04-29 | End: 2017-04-30

## 2017-04-29 RX ORDER — VANCOMYCIN HCL 1 G
1000 VIAL (EA) INTRAVENOUS EVERY 24 HOURS
Qty: 0 | Refills: 0 | Status: DISCONTINUED | OUTPATIENT
Start: 2017-04-30 | End: 2017-04-30

## 2017-04-29 RX ADMIN — SIMVASTATIN 10 MILLIGRAM(S): 20 TABLET, FILM COATED ORAL at 21:47

## 2017-04-29 RX ADMIN — PIPERACILLIN AND TAZOBACTAM 25 GRAM(S): 4; .5 INJECTION, POWDER, LYOPHILIZED, FOR SOLUTION INTRAVENOUS at 13:24

## 2017-04-29 RX ADMIN — Medication 1 SPRAY(S): at 11:08

## 2017-04-29 RX ADMIN — Medication 1 TABLET(S): at 17:09

## 2017-04-29 RX ADMIN — LORATADINE 10 MILLIGRAM(S): 10 TABLET ORAL at 11:08

## 2017-04-29 RX ADMIN — PIPERACILLIN AND TAZOBACTAM 25 GRAM(S): 4; .5 INJECTION, POWDER, LYOPHILIZED, FOR SOLUTION INTRAVENOUS at 21:47

## 2017-04-29 RX ADMIN — TAMSULOSIN HYDROCHLORIDE 0.4 MILLIGRAM(S): 0.4 CAPSULE ORAL at 21:47

## 2017-04-29 RX ADMIN — Medication 100 MICROGRAM(S): at 05:59

## 2017-04-29 RX ADMIN — HEPARIN SODIUM 5000 UNIT(S): 5000 INJECTION INTRAVENOUS; SUBCUTANEOUS at 17:09

## 2017-04-29 RX ADMIN — HEPARIN SODIUM 5000 UNIT(S): 5000 INJECTION INTRAVENOUS; SUBCUTANEOUS at 05:59

## 2017-04-29 RX ADMIN — Medication 100 MILLIGRAM(S): at 05:59

## 2017-04-29 RX ADMIN — Medication 100 MILLIGRAM(S): at 17:09

## 2017-04-29 RX ADMIN — PIPERACILLIN AND TAZOBACTAM 25 GRAM(S): 4; .5 INJECTION, POWDER, LYOPHILIZED, FOR SOLUTION INTRAVENOUS at 05:59

## 2017-04-29 RX ADMIN — SODIUM CHLORIDE 75 MILLILITER(S): 9 INJECTION INTRAMUSCULAR; INTRAVENOUS; SUBCUTANEOUS at 05:59

## 2017-04-29 RX ADMIN — FAMOTIDINE 20 MILLIGRAM(S): 10 INJECTION INTRAVENOUS at 11:08

## 2017-04-29 RX ADMIN — SODIUM CHLORIDE 75 MILLILITER(S): 9 INJECTION INTRAMUSCULAR; INTRAVENOUS; SUBCUTANEOUS at 21:48

## 2017-04-29 RX ADMIN — Medication 250 MILLIGRAM(S): at 09:22

## 2017-04-29 RX ADMIN — Medication 1 TABLET(S): at 07:44

## 2017-04-29 NOTE — PROGRESS NOTE ADULT - SUBJECTIVE AND OBJECTIVE BOX
Chief Complaint: AMS, fever    Interval Events: No significant events overnight.    Review of Systems:  General: No fevers, chills, weight loss or gain  Skin: No rashes, color changes  Cardiovascular: No chest pain, orthopnea  Respiratory: No shortness of breath, cough  Gastrointestinal: No nausea, abdominal pain  Genitourinary: No incontinence, pain with urination  Musculoskeletal: No pain, swelling, decreased range of motion  Neurological: No headache, weakness  Psychiatric: No depression, anxiety  Endocrine: No weight loss or gain, increased thirst  All other systems are comprehensively negative.    Physical Exam:  Vital Signs Last 24 Hrs  T(C): 36.3, Max: 39.4 (04-28 @ 12:00)  T(F): 97.3, Max: 102.9 (04-28 @ 12:00)  HR: 76 (76 - 99)  BP: 120/73 (106/64 - 142/80)  BP(mean): --  RR: 16 (16 - 17)  SpO2: 94% (90% - 100%)  General: NAD  Neck: No JVD, no carotid bruit  Lungs: CTAB  CV: RRR, nl S1/S2, no M/R/G  Abdomen: S/NT/ND, +BS  Extremities: No LE edema, no cyanosis    Labs:             04-29    143  |  113<H>  |  21  ----------------------------<  119<H>  3.6   |  17<L>  |  1.60<H>    Ca    7.9<L>      29 Apr 2017 08:31    TPro  6.3  /  Alb  2.5<L>  /  TBili  0.5  /  DBili  x   /  AST  18  /  ALT  13  /  AlkPhos  70  04-28                          9.2    7.9   )-----------( 70       ( 29 Apr 2017 08:31 )             30.1

## 2017-04-29 NOTE — PROGRESS NOTE ADULT - PROBLEM SELECTOR PLAN 2
stent placement in am as per  cons continue current managmnet and medications Palliative care consult followup due to overall poor prognosis,to discuss hospice eval

## 2017-04-29 NOTE — PROGRESS NOTE ADULT - SUBJECTIVE AND OBJECTIVE BOX
Patient is a 89y old  Female who presents with a chief complaint of fever (27 Apr 2017 23:45)      Subjective: Patient seen and examined at bedside. No acute events overnight.     PAIN: (0-10)   0	  DYSPNEA: N	  NAUS/VOM: N	  SECRETIONS: N	  AGITATION:  N    UNABLE TO OBTAIN  due to: delirium    T(C): 36.3, Max: 39.4 (04-28 @ 12:00)  HR: 76 (76 - 99)  BP: 120/73 (106/64 - 142/80)  RR: 16 (16 - 17)  SpO2: 94% (90% - 100%)  Wt(kg): --  GENERAL:  89 year old female resting comfortably in NAD  HEENT:  NC/AT EOMI PERRL  NECK: supple no JVD  CVS:  +S1 S2 RRR  RESP: CTA B/L  GI:  soft NT/ND +BS  : no suprapubic tenderness  MUSC:  no lower extremity edema  NEURO:  right sided weakness  PSYCH:  Confused, Calm, Cooperative   SKIN:  Warm, moist, no rashes   LYMPH: normal     MEDS REVIEWED	              No Known Allergies      LABS REVIEWED:                        9.2    7.9   )-----------( 70       ( 29 Apr 2017 08:31 )             30.1     04-29    143  |  113<H>  |  21  ----------------------------<  119<H>  3.6   |  17<L>  |  1.60<H>    Ca    7.9<L>      29 Apr 2017 08:31    TPro  6.3  /  Alb  2.5<L>  /  TBili  0.5  /  DBili  x   /  AST  18  /  ALT  13  /  AlkPhos  70  04-28    Culture - Blood (04.27.17 @ 21:31)    Gram Stain:   Growth in anaerobic bottle:  Gram Positive Cocci in Clusters    Specimen Source: .Blood Blood-Venous    Culture Results:   Growth in anaerobic bottle:  Gram Positive Cocci in Clusters    Culture - Urine (04.27.17 @ 21:59)    Specimen Source: .Urine Clean Catch (Midstream)    Culture Results:   >100,000 CFU/ml Proteus mirabilis      IMAGING REVIEWED    ADVANCED DIRECTIVES:  DNR     DNI     LIVING WILL     MOLST      PSYCHOSOCIAL-SPIRITUAL ASSESSMENT:    ___Reviewed     _x__Care  plan unchanged     ___Care plan adjusted as below    GOALS OF CARE DISCUSSION  	___Palliative care info/counseling provided	    ___Family meeting  	___Advanced Directives addressed	    _x__See previous Palliative Medicine Note    AGENCY CHOICE DISCUSSED:   ___HOSPICE   ___CALVARY  ___OTHER:

## 2017-04-29 NOTE — PROGRESS NOTE ADULT - ASSESSMENT
Pt is a 89 yo female ,[facility] resident with hx od CAD,HTN,HLD,DVT ,Breast ca,colon ca, s/p colostomy ,OA who presents to the ED with a cc of fever T max of 101.  Pt confused and unable  provide further history at this time Diagnosed with renal colic,found to have moderate to severe hydronephrosis with obstructing kidney stone and  eval called by ER Scheduled for OR in am for stent placement as per Dr Chang, urologist arrangement Admit for septic workup and ID evaluation,send blood and urine cx,serial lactate levels,monitor vitals closley,ivfs hydration,monitor urine output and renal profile,iv abx as per id cons Dr Bolden Palliative care consult requested due to overall poor prognosis,to discuss advance directives and complete MOLST

## 2017-04-29 NOTE — PROGRESS NOTE ADULT - PROBLEM SELECTOR PLAN 7
continue current managmnet and medications Palliative care consult requested due to overall poor prognosis,to discuss advance directives and complete MOLST continue current managmnet and medications Palliative care consult followup due to overall poor prognosis, Hospice care discussion

## 2017-04-29 NOTE — PROGRESS NOTE ADULT - SUBJECTIVE AND OBJECTIVE BOX
DATE   4/29/2017 12p     Patient seen Plan discussed/Questions answered (with patient/ancillary staff/colleagues) Chart notes reviewd More detailed Pulm/Critical Care notes, assessment and plan  will be added later today  (to current note) after reviewing labs problem list     Remarkable Interval history if any elicited is noted bld c gpc vanco started    ROS/PE   REVIEW OF SYMPTOMS    Able to give ROS  Yes     RELIABLE NO   Weakness Yes   Chills No   Vision changes No  Lymph nodes No enlarged glands   Endocrine No unexplained hair loss No het or cold intolerance   Allergy No hives   Sore throat No   Coughing blood No  Headache No  Confusion YES  Chest pain No  Palpitations No   Pain abdomen NO   Shortness of breath YES  Vomiting NO  Pain neck No  Pain abdomen NO       PHYSICAL EXAM    HEENT Unremarkable PERRLA atraumatic  RESP Fair air entry EXP prolonged    Harsh breath sound Resp distres mild  CARDIAC S1 S2 No S3     NO JVD   Awake Alert and ORIENTED x two   ABDOMEN SOFT BS PRESENT NOT DISTENDED No hepatosplenomegaly  PEDAL EDEMA present No calf tenderness  NO rash GENERAL Not TOXIC looking  . DATE   4/29/2017 12p     Patient seen Plan discussed/Questions answered (with patient/ancillary staff/colleagues) Chart notes reviewd More detailed Pulm/Critical Care notes, assessment and plan  will be added later today  (to current note) after reviewing labs problem list     Remarkable Interval history if any elicited is noted bld c gpc vanco started    ROS/PE   REVIEW OF SYMPTOMS    Able to give ROS  Yes     RELIABLE NO   Weakness Yes   Chills No   Vision changes No  Lymph nodes No enlarged glands   Endocrine No unexplained hair loss No het or cold intolerance   Allergy No hives   Sore throat No   Coughing blood No  Headache No  Confusion YES  Chest pain No  Palpitations No   Pain abdomen NO   Shortness of breath YES  Vomiting NO  Pain neck No  Pain abdomen NO       PHYSICAL EXAM    HEENT Unremarkable PERRLA atraumatic  RESP Fair air entry EXP prolonged    Harsh breath sound Resp distres mild  CARDIAC S1 S2 No S3     NO JVD   Awake Alert and ORIENTED x two   ABDOMEN SOFT BS PRESENT NOT DISTENDED No hepatosplenomegaly  PEDAL EDEMA present No calf tenderness  NO rash GENERAL Not TOXIC looking  . PATIENT DESCRIPTION  89 yo female ,care home resident with hx od CAD,HTN,HLD,DVT ,Breast ca,colon ca, s/p colostomy ,OA who presents to the ED with a cc of fever T max of 101.  Pt confused and unable  provide further history at this time Diagnosed with renal colic,found to have moderate to severe hydronephrosis with obstructing kidney stone and  eval called by ER.  Admited [facility] 4/27/2017  for septic workup Pulm consulted 4/28 as she was hypoxic and has is opac on CXR   RECENT EVENTS   4/29/2017  89 f admitted 4/27 with L obstr hydro sec renal stones but family declined invasive intervention On zosyn (4/27) Poss pneumonia 4/27 bc gpc c Started Vanco 4/29 4/27 uc 100k Proteus m Will monitor Vanco levels closely as Creat elevated 1.6 WALKER irdered as plt low   VITALS/LABS  4/29/2017 995 81 120/70 16 94%   4/29/2017 W 7.9 Hb 9.2 Plt 70 Na 143 K 3.6 CO2 17 Cr 1.6   PROBLEM:  PROTEUS UTI  4/28 W 5.4 4/27 uc Proetus   4/27 UA Over 50 W [place] bld Many bact   ASSESSMENT/RECOMMENDATION/PLAN On zosyn (4/27) lactobacillus (4/27)   PROBLEM:  RO PNEUMONIA  4/27 CXR IS changes lung l  ASSESSMENT/RECOMMENDATION/PLAN See UTI   PROBLEM:  GRAM POSITIVE BACTEREMIA   ASSESSMENT/RECOMMENDATION/PLAN 4/27 bc gpc Vanco 1 (4/29) added   PROBLEM:  LACTICEMIA   /27-4/28 LA 2.4-1.8-.9   ASSESSMENT/RECOMMENDATION/PLAN Improved  PROBLEM:  L HYDRONEPHROSIS   4/27 CTAP Marked L obstr hydro sec multiple l renal pelvic calculi   ASSESSMENT/RECOMMENDATION/PLAN 4/28 Dr JIMENEZ discussed patient's condition in detail with niece who is health care proxy (Anahi Vivar) who did not want any intervention with cysto/stent or percutaneous nephrostomy, rather she requested medical management at this time,  PROBLEM: ROSEMARIE    2/24-4/27-4/28 Cr .95-1.9-1.6-1.6  ASSESSMENT/RECOMMENDATION/PLAN On NS 75 (4/27)   PROBLEM:  BPH   ASSESSMENT/RECOMMENDATION/PLAN On Tamsulosin .4 (4/27)   PROBLEM:  ANEMIA Hb drop   2/24-4/27-4/28-4/29 Hb 11.8-11.2-9.8-9.2  ASSESSMENT/RECOMMENDATION/PLAN Drop may in part be sec to ivf  PROBLEM:   THROMBOCYTOPENIA   2/24-4/27-4/28  Plt 108-114-81  ASSESSMENT/RECOMMENDATION/PLAN Likely reduced sec sepsis   GLOBAL ISSUE/BEST PRACTICE:        PROBLEM:      Analgesia:         na       PROBLEM: Sedation:       na  PROBLEM: HOB elevation:      Yes  PROBLEM: Stress ulcer prophylaxis:        pepcid 20 (4/27)           PROBLEM: VTE prophylaxis:            hsc (4/27)               PROBLEM: Glycemic control:       na  PROBLEM: Nutrition:         ROQUE (4/28)   PROBLEM: Advanced directive if any:                 dnr (4/28)   TIME SPENT Over 25 minutes spent on total encounter; activities included  rendering direct patient care, counseling and/or coordinating care by the attending physician  reviewing notes, labs data/ imaging , discussion with multidisciplinary team.

## 2017-04-29 NOTE — PROGRESS NOTE ADULT - SUBJECTIVE AND OBJECTIVE BOX
PROGRESS NOTE    OVERNIGHT    SUBJECTIVE    PAST MEDICAL & SURGICAL HISTORY:  Hypothyroidism: newly diagnosed, started on T4 2013  Osteoarthritis  Colon cancer: &gt;30 years ago  Breast CA: &quot;many years ago&quot;  DVT (deep venous thrombosis): left leg not on A/C  HLD (hyperlipidemia)  Dementia  History of hysterectomy  Colostomy in place  Colon perforation  Ileostomy in place    HEALTH ISSUES - PROBLEM Dx:  Goals of care, counseling/discussion: Goals of care, counseling/discussion  Hydronephrosis with urinary obstruction due to ureteral calculus: Hydronephrosis with urinary obstruction due to ureteral calculus  Sepsis, due to unspecified organism: Sepsis, due to unspecified organism  Dementia without behavioral disturbance, unspecified dementia type: Dementia without behavioral disturbance, unspecified dementia type  HLD (hyperlipidemia): HLD (hyperlipidemia)  Colostomy in place: Colostomy in place  Dementia: Dementia  Osteoarthritis: Osteoarthritis  DVT (deep venous thrombosis): DVT (deep venous thrombosis)  Hypothyroidism: Hypothyroidism  Heart murmur: Heart murmur  Nephrolithiasis: Nephrolithiasis  Urinary tract infection with hematuria, site unspecified: Urinary tract infection with hematuria, site unspecified        FAMILY HISTORY:  No pertinent family history in first degree relatives    Allergies    No Known Allergies    Intolerances        MEDICATIONS  (STANDING):  simvastatin 10milliGRAM(s) Oral at bedtime  labetalol 100milliGRAM(s) Oral two times a day  heparin  Injectable 5000Unit(s) SubCutaneous every 12 hours  sodium chloride 0.9%. 1000milliLiter(s) IV Continuous <Continuous>  levothyroxine 100MICROGram(s) Oral daily  lactobacillus acidophilus 1Tablet(s) Oral two times a day with meals  famotidine    Tablet 20milliGRAM(s) Oral daily  tamsulosin 0.4milliGRAM(s) Oral at bedtime  lactulose Syrup 20Gram(s) Oral every other day  fluticasone propionate 50 MICROgram(s)/spray Nasal Spray 1Spray(s) Alternating Nostrils daily  loratadine 10milliGRAM(s) Oral daily  piperacillin/tazobactam IVPB. 3.375Gram(s) IV Intermittent every 8 hours  vancomycin  IVPB  IV Intermittent     MEDICATIONS  (PRN):  acetaminophen   Tablet 650milliGRAM(s) Oral every 6 hours PRN For Temp greater than 38 C (100.4 F)  acetaminophen   Tablet. 650milliGRAM(s) Oral every 6 hours PRN Mild Pain (1 - 3)  morphine  - Injectable 2milliGRAM(s) IV Push every 4 hours PRN Moderate Pain (4 - 6)  ondansetron Injectable 4milliGRAM(s) IV Push every 6 hours PRN Nausea and/or Vomiting  acetaminophen  Suppository 650milliGRAM(s) Rectal every 6 hours PRN For Temp greater than 38 C (100.4 F)      OBJECTIVE  PHYSICAL EXAM:  T(C): 36.3, Max: 38.1 (- @ 16:00)  HR: 76 (76 - 92)  BP: 120/73 (106/64 - 138/82)  RR: 16 (16 - 17)  SpO2: 94% (90% - 100%)  Wt(kg): --  I&O's Summary    I & Os for current day (as of 2017 12:09)  =============================================  IN: 2145 ml / OUT: 502 ml / NET: 1643 ml      Appearance: NAD.   HEENT:   Moist oral mucosa. Conjunctiva clear b/l.   Lymphatic: No cervical lymphadenopathy  Cardiovascular: RRR with no m/r/g.  Respiratory: Lungs CTAB.  Gastrointestinal:  Soft, nontender. No rebound. No rigidity. 	  Skin: No rashes, No ecchymoses, No cyanosis.	  Neurologic: Non-focal. Moving all extremities. Following commands.  Extremities: No edema. No erythema. No calf tenderness. Malvin's negative.  Vascular: DP 2+ b/l.  	  LABS:                        9.2    7.9   )-----------( 70       ( 2017 08:31 )             30.1     04-    143  |  113<H>  |  21  ----------------------------<  119<H>  3.6   |  17<L>  |  1.60<H>    Ca    7.9<L>      2017 08:31    TPro  6.3  /  Alb  2.5<L>  /  TBili  0.5  /  DBili  x   /  AST  18  /  ALT  13  /  AlkPhos  70  04-28    PT/INR - ( 2017 06:47 )   PT: 15.5 sec;   INR: 1.41 ratio           Urinalysis Basic - ( 2017 15:18 )    Color: Yellow / Appearance: Turbid / S.010 / pH: x  Gluc: x / Ketone: Trace  / Bili: Negative / Urobili: Negative   Blood: x / Protein: 75 mg/dL / Nitrite: Positive   Leuk Esterase: Moderate / RBC: x / WBC >50   Sq Epi: x / Non Sq Epi: Many / Bacteria: Many        RADIOLOGY & ADDITIONAL TESTS:    ASSESSMENT/PLAN: PROGRESS NOTE    OVERNIGHT- was febrile ovenight   hcp requests to continue abx ,but no agressive invasive workup   SUBJECTIVE -Patient is confused ,poor mentation Denies abd pain ,dysuria     PAST MEDICAL & SURGICAL HISTORY:  Hypothyroidism: newly diagnosed, started on T4 2013  Osteoarthritis  Colon cancer: &gt;30 years ago  Breast CA: &quot;many years ago&quot;  DVT (deep venous thrombosis): left leg not on A/C  HLD (hyperlipidemia)  Dementia  History of hysterectomy  Colostomy in place  Colon perforation  Ileostomy in place    HEALTH ISSUES - PROBLEM Dx:  Goals of care, counseling/discussion: Goals of care, counseling/discussion  Hydronephrosis with urinary obstruction due to ureteral calculus: Hydronephrosis with urinary obstruction due to ureteral calculus  Sepsis, due to unspecified organism: Sepsis, due to unspecified organism  Dementia without behavioral disturbance, unspecified dementia type: Dementia without behavioral disturbance, unspecified dementia type  HLD (hyperlipidemia): HLD (hyperlipidemia)  Colostomy in place: Colostomy in place  Dementia: Dementia  Osteoarthritis: Osteoarthritis  DVT (deep venous thrombosis): DVT (deep venous thrombosis)  Hypothyroidism: Hypothyroidism  Heart murmur: Heart murmur  Nephrolithiasis: Nephrolithiasis  Urinary tract infection with hematuria, site unspecified: Urinary tract infection with hematuria, site unspecified        FAMILY HISTORY:  No pertinent family history in first degree relatives    Allergies    No Known Allergies    Intolerances        MEDICATIONS  (STANDING):  simvastatin 10milliGRAM(s) Oral at bedtime  labetalol 100milliGRAM(s) Oral two times a day  heparin  Injectable 5000Unit(s) SubCutaneous every 12 hours  sodium chloride 0.9%. 1000milliLiter(s) IV Continuous <Continuous>  levothyroxine 100MICROGram(s) Oral daily  lactobacillus acidophilus 1Tablet(s) Oral two times a day with meals  famotidine    Tablet 20milliGRAM(s) Oral daily  tamsulosin 0.4milliGRAM(s) Oral at bedtime  lactulose Syrup 20Gram(s) Oral every other day  fluticasone propionate 50 MICROgram(s)/spray Nasal Spray 1Spray(s) Alternating Nostrils daily  loratadine 10milliGRAM(s) Oral daily  piperacillin/tazobactam IVPB. 3.375Gram(s) IV Intermittent every 8 hours  vancomycin  IVPB  IV Intermittent     MEDICATIONS  (PRN):  acetaminophen   Tablet 650milliGRAM(s) Oral every 6 hours PRN For Temp greater than 38 C (100.4 F)  acetaminophen   Tablet. 650milliGRAM(s) Oral every 6 hours PRN Mild Pain (1 - 3)  morphine  - Injectable 2milliGRAM(s) IV Push every 4 hours PRN Moderate Pain (4 - 6)  ondansetron Injectable 4milliGRAM(s) IV Push every 6 hours PRN Nausea and/or Vomiting  acetaminophen  Suppository 650milliGRAM(s) Rectal every 6 hours PRN For Temp greater than 38 C (100.4 F)      OBJECTIVE  PHYSICAL EXAM:  T(C): 36.3, Max: 38.1 (- @ 16:00)  HR: 76 (76 - 92)  BP: 120/73 (106/64 - 138/82)  RR: 16 (16 - 17)  SpO2: 94% (90% - 100%)  Wt(kg): --  I&O's Summary    I & Os for current day (as of 2017 12:09)  =============================================  IN: 2145 ml / OUT: 502 ml / NET: 1643 ml      Appearance: NAD.   HEENT:   Moist oral mucosa. Conjunctiva clear b/l.   Lymphatic: No cervical lymphadenopathy  Cardiovascular: RRR with no m/r/g.  Respiratory: Lungs CTAB.  Gastrointestinal:  Soft, nontender. No rebound. No rigidity. Colostomy -fecal materialCV Flank pain on l side on palpation	  Skin: No rashes, No ecchymoses, No cyanosis.	  Neurologic: Non-focal. Moving all extremities. Following commands.  Extremities: No edema. No erythema. No calf tenderness. Malvin's negative.  Vascular: DP 2+ b/l.  	  LABS:                        9.2    7.9   )-----------( 70       ( 2017 08:31 )             30.1     04-    143  |  113<H>  |  21  ----------------------------<  119<H>  3.6   |  17<L>  |  1.60<H>    Ca    7.9<L>      2017 08:31    TPro  6.3  /  Alb  2.5<L>  /  TBili  0.5  /  DBili  x   /  AST  18  /  ALT  13  /  AlkPhos  70      PT/INR - ( 2017 06:47 )   PT: 15.5 sec;   INR: 1.41 ratio           Urinalysis Basic - ( 2017 15:18 )    Color: Yellow / Appearance: Turbid / S.010 / pH: x  Gluc: x / Ketone: Trace  / Bili: Negative / Urobili: Negative   Blood: x / Protein: 75 mg/dL / Nitrite: Positive   Leuk Esterase: Moderate / RBC: x / WBC >50   Sq Epi: x / Non Sq Epi: Many / Bacteria: Many        RADIOLOGY & ADDITIONAL TESTS:    ASSESSMENT/PLAN:

## 2017-04-29 NOTE — PROGRESS NOTE ADULT - ASSESSMENT
The patient is a 89 year old female with a history of HL, DVT, breast cancer, colon cancer s/p colostomy, hypothyroidism, dementia who is admitted with urosepsis.    Plan:  - Continue labetalol 100 mg bid with holding parameters  - Antibiotics and IVF for UTI  - No plans for urologic intervention at this time as per family  - Discontinue telemetry

## 2017-04-29 NOTE — PROGRESS NOTE ADULT - PROBLEM SELECTOR PLAN 1
Niece (HCP) has opted for conservative treatment with iv antibiotics for treatment of sepsis. She doesn't not want any urological interventions for hydronephrosis. She will consider comfort care if she has severe sepsis.

## 2017-04-29 NOTE — PROGRESS NOTE ADULT - SUBJECTIVE AND OBJECTIVE BOX
INTERVAL HPI/OVERNIGHT EVENTS: No c/o    MEDICATIONS  (STANDING):  simvastatin 10milliGRAM(s) Oral at bedtime  labetalol 100milliGRAM(s) Oral two times a day  heparin  Injectable 5000Unit(s) SubCutaneous every 12 hours  sodium chloride 0.9%. 1000milliLiter(s) IV Continuous <Continuous>  levothyroxine 100MICROGram(s) Oral daily  lactobacillus acidophilus 1Tablet(s) Oral two times a day with meals  famotidine    Tablet 20milliGRAM(s) Oral daily  tamsulosin 0.4milliGRAM(s) Oral at bedtime  lactulose Syrup 20Gram(s) Oral every other day  fluticasone propionate 50 MICROgram(s)/spray Nasal Spray 1Spray(s) Alternating Nostrils daily  loratadine 10milliGRAM(s) Oral daily  piperacillin/tazobactam IVPB. 3.375Gram(s) IV Intermittent every 8 hours  vancomycin  IVPB  IV Intermittent     MEDICATIONS  (PRN):  acetaminophen   Tablet 650milliGRAM(s) Oral every 6 hours PRN For Temp greater than 38 C (100.4 F)  acetaminophen   Tablet. 650milliGRAM(s) Oral every 6 hours PRN Mild Pain (1 - 3)  morphine  - Injectable 2milliGRAM(s) IV Push every 4 hours PRN Moderate Pain (4 - 6)  ondansetron Injectable 4milliGRAM(s) IV Push every 6 hours PRN Nausea and/or Vomiting  acetaminophen  Suppository 650milliGRAM(s) Rectal every 6 hours PRN For Temp greater than 38 C (100.4 F)        Vital Signs Last 24 Hrs  T(C): 36.3, Max: 38.1 (- @ 16:00)  T(F): 97.3, Max: 100.6 (- @ 16:00)  HR: 76 (76 - 92)  BP: 120/73 (106/64 - 138/82)  BP(mean): --  RR: 16 (16 - 17)  SpO2: 94% (90% - 100%)    PHYSICAL EXAM:  No CVAT  ABDOMEN: soft, NT/ND    LABS:                        9.2    7.9   )-----------( 70       ( 2017 08:31 )             30.1     -    143  |  113<H>  |  21  ----------------------------<  119<H>  3.6   |  17<L>  |  1.60<H>    Ca    7.9<L>      2017 08:31    TPro  6.3  /  Alb  2.5<L>  /  TBili  0.5  /  DBili  x   /  AST  18  /  ALT  13  /  AlkPhos  70      PT/INR - ( 2017 06:47 )   PT: 15.5 sec;   INR: 1.41 ratio           Urinalysis Basic - ( 2017 15:18 )    Color: Yellow / Appearance: Turbid / S.010 / pH: x  Gluc: x / Ketone: Trace  / Bili: Negative / Urobili: Negative   Blood: x / Protein: 75 mg/dL / Nitrite: Positive   Leuk Esterase: Moderate / RBC: x / WBC >50   Sq Epi: x / Non Sq Epi: Many / Bacteria: Many      Urine culture:   @ 21:59 --   >100,000 CFU/ml Proteus mirabilis  Urine culture:   @ 21:31 --   Growth in anaerobic bottle:  Gram Positive Cocci in Clusters    Blood Cultures:   @ 21:59   >100,000 CFU/ml Proteus mirabilis  --  --   @ 21:31   Growth in anaerobic bottle:  Gram Positive Cocci in Clusters  --  --    RADIOLOGY & ADDITIONAL TESTS:

## 2017-04-29 NOTE — PROGRESS NOTE ADULT - PROBLEM SELECTOR PLAN 1
Admit for septic workup and ID evaluation,send blood and urine cx,serial lactate levels,monitor vitals closley,ivfs hydration,monitor urine output and renal profile,iv abx as per id cons  malcolm reuested Admit for septic workup and ID evaluation,send blood and urine cx,serial lactate levels,monitor vitals closley,ivfs hydration,monitor urine output and renal profile,iv abx as per id cons PORTER eval reuested Family refused sten palacement and patient is currently on limited medical inteerventons ,continue abx for now D/w PALL CARE CONS Dr Cui ,R

## 2017-04-30 LAB
ALBUMIN SERPL ELPH-MCNC: 2.2 G/DL — LOW (ref 3.3–5)
ALP SERPL-CCNC: 62 U/L — SIGNIFICANT CHANGE UP (ref 40–120)
ALT FLD-CCNC: 13 U/L — SIGNIFICANT CHANGE UP (ref 12–78)
ANION GAP SERPL CALC-SCNC: 12 MMOL/L — SIGNIFICANT CHANGE UP (ref 5–17)
AST SERPL-CCNC: 15 U/L — SIGNIFICANT CHANGE UP (ref 15–37)
BILIRUB SERPL-MCNC: 0.4 MG/DL — SIGNIFICANT CHANGE UP (ref 0.2–1.2)
BUN SERPL-MCNC: 19 MG/DL — SIGNIFICANT CHANGE UP (ref 7–23)
CALCIUM SERPL-MCNC: 7.8 MG/DL — LOW (ref 8.5–10.1)
CHLORIDE SERPL-SCNC: 116 MMOL/L — HIGH (ref 96–108)
CO2 SERPL-SCNC: 18 MMOL/L — LOW (ref 22–31)
CREAT SERPL-MCNC: 1.5 MG/DL — HIGH (ref 0.5–1.3)
CULTURE RESULTS: SIGNIFICANT CHANGE UP
GLUCOSE SERPL-MCNC: 109 MG/DL — HIGH (ref 70–99)
HCT VFR BLD CALC: 29.8 % — LOW (ref 34.5–45)
HGB BLD-MCNC: 9.3 G/DL — LOW (ref 11.5–15.5)
INR BLD: 1.47 RATIO — HIGH (ref 0.88–1.16)
MAGNESIUM SERPL-MCNC: 1.9 MG/DL — SIGNIFICANT CHANGE UP (ref 1.8–2.4)
MCHC RBC-ENTMCNC: 27.5 PG — SIGNIFICANT CHANGE UP (ref 27–34)
MCHC RBC-ENTMCNC: 31.3 GM/DL — LOW (ref 32–36)
MCV RBC AUTO: 87.9 FL — SIGNIFICANT CHANGE UP (ref 80–100)
PHOSPHATE SERPL-MCNC: 1.6 MG/DL — LOW (ref 2.5–4.5)
PLATELET # BLD AUTO: 90 K/UL — LOW (ref 150–400)
POTASSIUM SERPL-MCNC: 3.3 MMOL/L — LOW (ref 3.5–5.3)
POTASSIUM SERPL-SCNC: 3.3 MMOL/L — LOW (ref 3.5–5.3)
PROT SERPL-MCNC: 5.9 G/DL — LOW (ref 6–8.3)
PROTHROM AB SERPL-ACNC: 16.1 SEC — HIGH (ref 9.8–12.7)
RBC # BLD: 3.39 M/UL — LOW (ref 3.8–5.2)
RBC # FLD: 14.1 % — SIGNIFICANT CHANGE UP (ref 10.3–14.5)
SODIUM SERPL-SCNC: 146 MMOL/L — HIGH (ref 135–145)
SPECIMEN SOURCE: SIGNIFICANT CHANGE UP
VANCOMYCIN TROUGH SERPL-MCNC: 5.7 UG/ML — LOW (ref 10–20)
WBC # BLD: 6.8 K/UL — SIGNIFICANT CHANGE UP (ref 3.8–10.5)
WBC # FLD AUTO: 6.8 K/UL — SIGNIFICANT CHANGE UP (ref 3.8–10.5)

## 2017-04-30 RX ORDER — POTASSIUM CHLORIDE 20 MEQ
20 PACKET (EA) ORAL ONCE
Qty: 0 | Refills: 0 | Status: COMPLETED | OUTPATIENT
Start: 2017-04-30 | End: 2017-04-30

## 2017-04-30 RX ADMIN — SODIUM CHLORIDE 30 MILLILITER(S): 9 INJECTION INTRAMUSCULAR; INTRAVENOUS; SUBCUTANEOUS at 17:47

## 2017-04-30 RX ADMIN — PIPERACILLIN AND TAZOBACTAM 25 GRAM(S): 4; .5 INJECTION, POWDER, LYOPHILIZED, FOR SOLUTION INTRAVENOUS at 05:02

## 2017-04-30 RX ADMIN — Medication 250 MILLIGRAM(S): at 09:35

## 2017-04-30 RX ADMIN — Medication 100 MILLIGRAM(S): at 05:02

## 2017-04-30 RX ADMIN — HEPARIN SODIUM 5000 UNIT(S): 5000 INJECTION INTRAVENOUS; SUBCUTANEOUS at 05:01

## 2017-04-30 RX ADMIN — HEPARIN SODIUM 5000 UNIT(S): 5000 INJECTION INTRAVENOUS; SUBCUTANEOUS at 17:46

## 2017-04-30 RX ADMIN — Medication 100 MICROGRAM(S): at 05:02

## 2017-04-30 RX ADMIN — PIPERACILLIN AND TAZOBACTAM 25 GRAM(S): 4; .5 INJECTION, POWDER, LYOPHILIZED, FOR SOLUTION INTRAVENOUS at 22:47

## 2017-04-30 RX ADMIN — PIPERACILLIN AND TAZOBACTAM 25 GRAM(S): 4; .5 INJECTION, POWDER, LYOPHILIZED, FOR SOLUTION INTRAVENOUS at 13:49

## 2017-04-30 NOTE — PROGRESS NOTE ADULT - SUBJECTIVE AND OBJECTIVE BOX
Interval History:    CENTRAL LINE:   [  ] YES       [  ] NO  MCKEE:                 [  ] YES       [  ] NO         REVIEW OF SYSTEMS:  All Systems below were reviewed and are negative [  ]  HEENT:  ID:  Pulmonary:  Cardiac:  GI:  Renal:  Musculoskeletal:  All other systems above were reviewed and are negative   [  ]      MEDICATIONS  (STANDING):  heparin  Injectable 5000Unit(s) SubCutaneous every 12 hours  sodium chloride 0.9%. 1000milliLiter(s) IV Continuous <Continuous>  levothyroxine 100MICROGram(s) Oral daily  lactobacillus acidophilus 1Tablet(s) Oral two times a day with meals  tamsulosin 0.4milliGRAM(s) Oral at bedtime  lactulose Syrup 20Gram(s) Oral every other day  piperacillin/tazobactam IVPB. 3.375Gram(s) IV Intermittent every 8 hours    MEDICATIONS  (PRN):  acetaminophen   Tablet 650milliGRAM(s) Oral every 6 hours PRN For Temp greater than 38 C (100.4 F)  acetaminophen   Tablet. 650milliGRAM(s) Oral every 6 hours PRN Mild Pain (1 - 3)  morphine  - Injectable 2milliGRAM(s) IV Push every 4 hours PRN Moderate Pain (4 - 6)  ondansetron Injectable 4milliGRAM(s) IV Push every 6 hours PRN Nausea and/or Vomiting  acetaminophen  Suppository 650milliGRAM(s) Rectal every 6 hours PRN For Temp greater than 38 C (100.4 F)      Vital Signs Last 24 Hrs  T(C): 37.9, Max: 37.9 (04-30 @ 12:38)  T(F): 100.3, Max: 100.3 (04-30 @ 12:38)  HR: 70 (70 - 96)  BP: 182/75 (98/65 - 182/75)  BP(mean): --  RR: 16 (16 - 16)  SpO2: 94% (92% - 94%)    I&O's Summary  I & Os for 24h ending 30 Apr 2017 07:00  =============================================  IN: 1000 ml / OUT: 451 ml / NET: 549 ml    I & Os for current day (as of 30 Apr 2017 22:09)  =============================================  IN: 910 ml / OUT: 0 ml / NET: 910 ml      PHYSICAL EXAM:  HEENT: NC/AT; PERRLA  Neck: Soft; no tenderness  Lungs: CTA bilaterally; no wheezing.   Heart:  Abdomen:  Genital/ Rectal:  Extremities:  Neurologic:  Vascular:      LABORATORY:    CBC Full  -  ( 30 Apr 2017 06:25 )  WBC Count : 6.8 K/uL  Hemoglobin : 9.3 g/dL  Hematocrit : 29.8 %  Platelet Count - Automated : 90 K/uL  Mean Cell Volume : 87.9 fl  Mean Cell Hemoglobin : 27.5 pg  Mean Cell Hemoglobin Concentration : 31.3 gm/dL  Auto Neutrophil # : x  Auto Lymphocyte # : x  Auto Monocyte # : x  Auto Eosinophil # : x  Auto Basophil # : x  Auto Neutrophil % : x  Auto Lymphocyte % : x  Auto Monocyte % : x  Auto Eosinophil % : x  Auto Basophil % : x          04-30    146<H>  |  116<H>  |  19  ----------------------------<  109<H>  3.3<L>   |  18<L>  |  1.50<H>    Ca    7.8<L>      30 Apr 2017 06:25  Phos  1.6     04-30  Mg     1.9     04-30    TPro  5.9<L>  /  Alb  2.2<L>  /  TBili  0.4  /  DBili  x   /  AST  15  /  ALT  13  /  AlkPhos  62  04-30          Assessment and Plan:          Pranay Bolden MD   [phone]. She is very lethargic. No fevers noted.  Minimal oral intake.      MEDICATIONS  (STANDING):  heparin  Injectable 5000Unit(s) SubCutaneous every 12 hours  sodium chloride 0.9%. 1000milliLiter(s) IV Continuous <Continuous>  levothyroxine 100MICROGram(s) Oral daily  lactobacillus acidophilus 1Tablet(s) Oral two times a day with meals  tamsulosin 0.4milliGRAM(s) Oral at bedtime  lactulose Syrup 20Gram(s) Oral every other day  piperacillin/tazobactam IVPB. 3.375Gram(s) IV Intermittent every 8 hours    MEDICATIONS  (PRN):  acetaminophen   Tablet 650milliGRAM(s) Oral every 6 hours PRN For Temp greater than 38 C (100.4 F)  acetaminophen   Tablet. 650milliGRAM(s) Oral every 6 hours PRN Mild Pain (1 - 3)  morphine  - Injectable 2milliGRAM(s) IV Push every 4 hours PRN Moderate Pain (4 - 6)  ondansetron Injectable 4milliGRAM(s) IV Push every 6 hours PRN Nausea and/or Vomiting  acetaminophen  Suppository 650milliGRAM(s) Rectal every 6 hours PRN For Temp greater than 38 C (100.4 F)      Vital Signs Last 24 Hrs  T(C): 37.9, Max: 37.9 (04-30 @ 12:38)  T(F): 100.3, Max: 100.3 (04-30 @ 12:38)  HR: 70 (70 - 96)  BP: 182/75 (98/65 - 182/75)  BP(mean): --  RR: 16 (16 - 16)  SpO2: 94% (92% - 94%)    I&O's Summary  I & Os for 24h ending 30 Apr 2017 07:00  =============================================  IN: 1000 ml / OUT: 451 ml / NET: 549 ml    I & Os for current day (as of 30 Apr 2017 22:09)  =============================================  IN: 910 ml / OUT: 0 ml / NET: 910 ml      PHYSICAL EXAM:  HEENT: NC/AT; PERRLA  Neck: Soft; no tenderness  Lungs: CTA bilaterally; no wheezing.   Heart:RRR; no murmurs.   Abdomen: Soft; no tenderness.  Extremities: No ulcers noted.     LABORATORY:    CBC Full  -  ( 30 Apr 2017 06:25 )  WBC Count : 6.8 K/uL  Hemoglobin : 9.3 g/dL  Hematocrit : 29.8 %  Platelet Count - Automated : 90 K/uL  Mean Cell Volume : 87.9 fl  Mean Cell Hemoglobin : 27.5 pg  Mean Cell Hemoglobin Concentration : 31.3 gm/dL  Auto Neutrophil # : x  Auto Lymphocyte # : x  Auto Monocyte # : x  Auto Eosinophil # : x  Auto Basophil # : x  Auto Neutrophil % : x  Auto Lymphocyte % : x  Auto Monocyte % : x  Auto Eosinophil % : x  Auto Basophil % : x      04-30    146<H>  |  116<H>  |  19  ----------------------------<  109<H>  3.3<L>   |  18<L>  |  1.50<H>    Ca    7.8<L>      30 Apr 2017 06:25  Phos  1.6     04-30  Mg     1.9     04-30    TPro  5.9<L>  /  Alb  2.2<L>  /  TBili  0.4  /  DBili  x   /  AST  15  /  ALT  13  /  AlkPhos  62  04-30      Assessment and Plan:    1. UTI   2. Marked left hydronephrosis.  3. Obstructed left renal stones.    . Family refused ureteral stent placement. Now is considering palliative care.  . Continue IV Zosyn for now.   . Supportive care.       Pranay Bolden MD   [phone].

## 2017-04-30 NOTE — PROGRESS NOTE ADULT - ASSESSMENT
The patient is a 89 year old female with a history of HL, DVT, breast cancer, colon cancer s/p colostomy, hypothyroidism, dementia who is admitted with urosepsis.    Plan:  - BP on lower side, discontinue labetalol  - Antibiotics and IVF for UTI  - No plans for urologic intervention at this time as per family  - Wheezing today, pulmonary follow-up  - Awaiting speciation of blood cultures

## 2017-04-30 NOTE — PROGRESS NOTE ADULT - SUBJECTIVE AND OBJECTIVE BOX
PROGRESS NOTE    OVERNIGHT    SUBJECTIVE    PAST MEDICAL & SURGICAL HISTORY:  Hypothyroidism: newly diagnosed, started on T4 4/2013  Osteoarthritis  Colon cancer: &gt;30 years ago  Breast CA: &quot;many years ago&quot;  DVT (deep venous thrombosis): left leg not on A/C  HLD (hyperlipidemia)  Dementia  History of hysterectomy  Colostomy in place  Colon perforation  Ileostomy in place    HEALTH ISSUES - PROBLEM Dx:  Goals of care, counseling/discussion: Goals of care, counseling/discussion  Hydronephrosis with urinary obstruction due to ureteral calculus: Hydronephrosis with urinary obstruction due to ureteral calculus  Sepsis, due to unspecified organism: Sepsis, due to unspecified organism  Dementia without behavioral disturbance, unspecified dementia type: Dementia without behavioral disturbance, unspecified dementia type  HLD (hyperlipidemia): HLD (hyperlipidemia)  Colostomy in place: Colostomy in place  Dementia: Dementia  Osteoarthritis: Osteoarthritis  DVT (deep venous thrombosis): DVT (deep venous thrombosis)  Hypothyroidism: Hypothyroidism  Heart murmur: Heart murmur  Nephrolithiasis: Nephrolithiasis  Urinary tract infection with hematuria, site unspecified: Urinary tract infection with hematuria, site unspecified        FAMILY HISTORY:  No pertinent family history in first degree relatives    Allergies    No Known Allergies    Intolerances        MEDICATIONS  (STANDING):  simvastatin 10milliGRAM(s) Oral at bedtime  heparin  Injectable 5000Unit(s) SubCutaneous every 12 hours  sodium chloride 0.9%. 1000milliLiter(s) IV Continuous <Continuous>  levothyroxine 100MICROGram(s) Oral daily  lactobacillus acidophilus 1Tablet(s) Oral two times a day with meals  famotidine    Tablet 20milliGRAM(s) Oral daily  tamsulosin 0.4milliGRAM(s) Oral at bedtime  lactulose Syrup 20Gram(s) Oral every other day  fluticasone propionate 50 MICROgram(s)/spray Nasal Spray 1Spray(s) Alternating Nostrils daily  loratadine 10milliGRAM(s) Oral daily  piperacillin/tazobactam IVPB. 3.375Gram(s) IV Intermittent every 8 hours  vancomycin  IVPB  IV Intermittent   vancomycin  IVPB 1000milliGRAM(s) IV Intermittent every 24 hours    MEDICATIONS  (PRN):  acetaminophen   Tablet 650milliGRAM(s) Oral every 6 hours PRN For Temp greater than 38 C (100.4 F)  acetaminophen   Tablet. 650milliGRAM(s) Oral every 6 hours PRN Mild Pain (1 - 3)  morphine  - Injectable 2milliGRAM(s) IV Push every 4 hours PRN Moderate Pain (4 - 6)  ondansetron Injectable 4milliGRAM(s) IV Push every 6 hours PRN Nausea and/or Vomiting  acetaminophen  Suppository 650milliGRAM(s) Rectal every 6 hours PRN For Temp greater than 38 C (100.4 F)      OBJECTIVE  PHYSICAL EXAM:  T(C): 37.9, Max: 37.9 (04-30 @ 12:38)  HR: 70 (70 - 96)  BP: 182/75 (98/65 - 182/75)  RR: 16 (16 - 17)  SpO2: 94% (92% - 94%)  Wt(kg): --  I&O's Summary    I & Os for current day (as of 30 Apr 2017 13:46)  =============================================  IN: 1000 ml / OUT: 451 ml / NET: 549 ml      Appearance: NAD.   HEENT:   Moist oral mucosa. Conjunctiva clear b/l.   Lymphatic: No cervical lymphadenopathy  Cardiovascular: RRR with no m/r/g.  Respiratory: Lungs CTAB.  Gastrointestinal:  Soft, nontender. No rebound. No rigidity. 	  Skin: No rashes, No ecchymoses, No cyanosis.	  Neurologic: Non-focal. Moving all extremities. Following commands.  Extremities: No edema. No erythema. No calf tenderness. Malvin's negative.  Vascular: DP 2+ b/l.  	  LABS:                        9.3    6.8   )-----------( 90       ( 30 Apr 2017 06:25 )             29.8     04-30    146<H>  |  116<H>  |  19  ----------------------------<  109<H>  3.3<L>   |  18<L>  |  1.50<H>    Ca    7.8<L>      30 Apr 2017 06:25  Phos  1.6     04-30  Mg     1.9     04-30    TPro  5.9<L>  /  Alb  2.2<L>  /  TBili  0.4  /  DBili  x   /  AST  15  /  ALT  13  /  AlkPhos  62  04-30    PT/INR - ( 30 Apr 2017 06:25 )   PT: 16.1 sec;   INR: 1.47 ratio               RADIOLOGY & ADDITIONAL TESTS:    ASSESSMENT/PLAN: PROGRESS NOTE    OVERNIGHT-Chnage is colostomy bag appearance reported by med staff  Colostomy bag contains loops of bowels,protruding from abdominal cavity into the bag,no signs of necrosis or bleeding noted   SUBJECTIVE Comfortably resting in abed ,pleasantly confused ,denies abd pain Family ,including HCP is at bedside     PAST MEDICAL & SURGICAL HISTORY:  Hypothyroidism: newly diagnosed, started on T4 4/2013  Osteoarthritis  Colon cancer: &gt;30 years ago  Breast CA: &quot;many years ago&quot;  DVT (deep venous thrombosis): left leg not on A/C  HLD (hyperlipidemia)  Dementia  History of hysterectomy  Colostomy in place  Colon perforation  Ileostomy in place    HEALTH ISSUES - PROBLEM Dx:  Goals of care, counseling/discussion: Goals of care, counseling/discussion  Hydronephrosis with urinary obstruction due to ureteral calculus: Hydronephrosis with urinary obstruction due to ureteral calculus  Sepsis, due to unspecified organism: Sepsis, due to unspecified organism  Dementia without behavioral disturbance, unspecified dementia type: Dementia without behavioral disturbance, unspecified dementia type  HLD (hyperlipidemia): HLD (hyperlipidemia)  Colostomy in place: Colostomy in place  Dementia: Dementia  Osteoarthritis: Osteoarthritis  DVT (deep venous thrombosis): DVT (deep venous thrombosis)  Hypothyroidism: Hypothyroidism  Heart murmur: Heart murmur  Nephrolithiasis: Nephrolithiasis  Urinary tract infection with hematuria, site unspecified: Urinary tract infection with hematuria, site unspecified        FAMILY HISTORY:  No pertinent family history in first degree relatives    Allergies    No Known Allergies    Intolerances        MEDICATIONS  (STANDING):  simvastatin 10milliGRAM(s) Oral at bedtime  heparin  Injectable 5000Unit(s) SubCutaneous every 12 hours  sodium chloride 0.9%. 1000milliLiter(s) IV Continuous <Continuous>  levothyroxine 100MICROGram(s) Oral daily  lactobacillus acidophilus 1Tablet(s) Oral two times a day with meals  famotidine    Tablet 20milliGRAM(s) Oral daily  tamsulosin 0.4milliGRAM(s) Oral at bedtime  lactulose Syrup 20Gram(s) Oral every other day  fluticasone propionate 50 MICROgram(s)/spray Nasal Spray 1Spray(s) Alternating Nostrils daily  loratadine 10milliGRAM(s) Oral daily  piperacillin/tazobactam IVPB. 3.375Gram(s) IV Intermittent every 8 hours  vancomycin  IVPB  IV Intermittent   vancomycin  IVPB 1000milliGRAM(s) IV Intermittent every 24 hours    MEDICATIONS  (PRN):  acetaminophen   Tablet 650milliGRAM(s) Oral every 6 hours PRN For Temp greater than 38 C (100.4 F)  acetaminophen   Tablet. 650milliGRAM(s) Oral every 6 hours PRN Mild Pain (1 - 3)  morphine  - Injectable 2milliGRAM(s) IV Push every 4 hours PRN Moderate Pain (4 - 6)  ondansetron Injectable 4milliGRAM(s) IV Push every 6 hours PRN Nausea and/or Vomiting  acetaminophen  Suppository 650milliGRAM(s) Rectal every 6 hours PRN For Temp greater than 38 C (100.4 F)      OBJECTIVE  PHYSICAL EXAM:  T(C): 37.9, Max: 37.9 (04-30 @ 12:38)  HR: 70 (70 - 96)  BP: 182/75 (98/65 - 182/75)  RR: 16 (16 - 17)  SpO2: 94% (92% - 94%)  Wt(kg): --  I&O's Summary    I & Os for current day (as of 30 Apr 2017 13:46)  =============================================  IN: 1000 ml / OUT: 451 ml / NET: 549 ml      Appearance: NAD. Confused ,poor mentation ,hallucinations   HEENT:   Moist oral mucosa. Conjunctiva clear b/l.   Lymphatic: No cervical lymphadenopathy  Cardiovascular: RRR with no m/r/g.  Respiratory: Lungs decreased bs at bases   Gastrointestinal:  Soft, nontender. No rebound. No rigidity. Colostomy bag  contains loo[s of bowel ,no bleeding or necrosis noted	  Skin: No rashes, No ecchymoses, No cyanosis.	  Neurologic: Non-focal. Moving all extremities. Following commands.  Extremities: No edema. No erythema. No calf tenderness. Malvin's negative.  Vascular: DP 2+ b/l.  	  LABS:                        9.3    6.8   )-----------( 90       ( 30 Apr 2017 06:25 )             29.8     04-30    146<H>  |  116<H>  |  19  ----------------------------<  109<H>  3.3<L>   |  18<L>  |  1.50<H>    Ca    7.8<L>      30 Apr 2017 06:25  Phos  1.6     04-30  Mg     1.9     04-30    TPro  5.9<L>  /  Alb  2.2<L>  /  TBili  0.4  /  DBili  x   /  AST  15  /  ALT  13  /  AlkPhos  62  04-30    PT/INR - ( 30 Apr 2017 06:25 )   PT: 16.1 sec;   INR: 1.47 ratio               RADIOLOGY & ADDITIONAL TESTS:    ASSESSMENT/PLAN:

## 2017-04-30 NOTE — PROGRESS NOTE ADULT - PROBLEM SELECTOR PLAN 7
continue current managmnet and medications Palliative care consult followup due to overall poor prognosis, Hospice care discussion

## 2017-04-30 NOTE — PROGRESS NOTE ADULT - SUBJECTIVE AND OBJECTIVE BOX
Chief Complaint: AMS, fever    Interval Events: No significant events overnight.    Review of Systems:  General: No fevers, chills, weight loss or gain  Skin: No rashes, color changes  Cardiovascular: No chest pain, orthopnea  Respiratory: No shortness of breath, cough  Gastrointestinal: No nausea, abdominal pain  Genitourinary: No incontinence, pain with urination  Musculoskeletal: No pain, swelling, decreased range of motion  Neurological: No headache, weakness  Psychiatric: No depression, anxiety  Endocrine: No weight loss or gain, increased thirst  All other systems are comprehensively negative.    Physical Exam:  Vital Signs Last 24 Hrs  T(C): 36.3, Max: 39.4 (04-28 @ 12:00)  T(F): 97.3, Max: 102.9 (04-28 @ 12:00)  HR: 76 (76 - 99)  BP: 120/73 (106/64 - 142/80)  BP(mean): --  RR: 16 (16 - 17)  SpO2: 94% (90% - 100%)  General: NAD  Neck: No JVD, no carotid bruit  Lungs: bilateral wheezing  CV: RRR, nl S1/S2, no M/R/G  Abdomen: S/NT/ND, +BS  Extremities: No LE edema, no cyanosis    Labs:             04-30    146<H>  |  116<H>  |  19  ----------------------------<  109<H>  3.3<L>   |  18<L>  |  1.50<H>    Ca    7.8<L>      30 Apr 2017 06:25  Phos  1.6     04-30  Mg     1.9     04-30    TPro  5.9<L>  /  Alb  2.2<L>  /  TBili  0.4  /  DBili  x   /  AST  15  /  ALT  13  /  AlkPhos  62  04-30                          9.3    6.8   )-----------( 90       ( 30 Apr 2017 06:25 )             29.8

## 2017-04-30 NOTE — PROGRESS NOTE ADULT - PROBLEM SELECTOR PLAN 2
continue current managmnet and medications Palliative care consult followup due to overall poor prognosis,to discuss hospice eval

## 2017-04-30 NOTE — PROGRESS NOTE ADULT - PROBLEM SELECTOR PLAN 1
Josef (HCP) has opted for conservative treatment with iv antibiotics for treatment of sepsis. She doesn't not want any urological interventions for hydronephrosis. In light of the GI findings today (loops of bowel in the colostomy), josef wants comfort measures only. She wants hospice referral.

## 2017-04-30 NOTE — PROGRESS NOTE ADULT - ASSESSMENT
Pt is a 89 yo female ,[facility] resident with hx od CAD,HTN,HLD,DVT ,Breast ca,colon ca, s/p colostomy ,OA who presents to the ED with a cc of fever T max of 101.  Pt confused and unable  provide further history at this time Diagnosed with renal colic,found to have moderate to severe hydronephrosis with obstructing kidney stone and  eval called by ER Scheduled for OR in am for stent placement as per Dr Chang, urologist arrangement Admit for septic workup and ID evaluation,send blood and urine cx,serial lactate levels,monitor vitals closley,ivfs hydration,monitor urine output and renal profile,iv abx as per id cons Dr Bolden Palliative care consult requested due to overall poor prognosis,to discuss advance directives and complete MOLST

## 2017-04-30 NOTE — PROGRESS NOTE ADULT - SUBJECTIVE AND OBJECTIVE BOX
DATE  4/30/2017     Patient seen Plan discussed/Questions answered (with patient/ancillary staff/colleagues) Chart notes reviewd More detailed Pulm/Critical Care notes, assessment and plan  will be added later today  (to current note) after reviewing labs problem list     Remarkable Interval history if any elicited is noted For hospice placement    ROS/PE   REVIEW OF SYMPTOMS    Able to give ROS  Yes     RELIABLE NO   Weakness Yes   Chills No   Vision changes No  Lymph nodes No enlarged glands   Endocrine No unexplained hair loss No het or cold intolerance   Allergy No hives   Sore throat No   Coughing blood No  Headache No  Confusion YES  Chest pain No  Palpitations No   Pain abdomen NO   Shortness of breath YES  Vomiting NO  Pain neck No  Pain abdomen NO     PHYSICAL EXAM    HEENT Unremarkable PERRLA atraumatic  RESP Fair air entry EXP prolonged    Harsh breath sound Resp distres mild  CARDIAC S1 S2 No S3     NO JVD   Awake Alert and ORIENTED x 2  ABDOMEN SOFT BS PRESENT NOT DISTENDED No hepatosplenomegaly  PEDAL EDEMA present No calf tenderness  NO rash GENERAL Not TOXIC looking  . PATIENT DESCRIPTION  89 yo female ,[facility] resident [facility] CAD,HTN,HLD,DVT ,Breast ca,colon ca, s/p colostomy ,OA admited [facility] 4/27/2017 with renal colic, severe hydronephrosis with obstructing kidney stone  Pulm consulted 4/28 as she was hypoxic and has is opac on CXR   HOSPITAL COURSE   89 f admitted 4/27 with L obstr hydro sec renal stones but family declined invasive intervention Rxed zosyn (4/27) for UTI  (4/27 uc 100k Proteus m)  and pneumonia On  4/27 bc gpc c  Vanco added to zosyn on 4/29  Creat elevated 1.6 (ROSEMARIE)   RECENT EVENTS   4/30 4/29 BC n 4/27 bc grew CNS single set likely contaminant Vanco Dced 4/30 Hospice referral being planned   4/29 Will monitor Vanco levels closely as Creat elevated 1.6 WALKER irdered as plt low   VITALS  1003 70 180/75 16   LABS   4/30/2017 W 6.8 Hb 9.3 Plt 90 INR 1,47 Na 146 K 3.3 CO2 18 Cr 1.5 PO4 1.6   PROBLEM/ASSESSMENT/PLAN  PROBLEM: RESP   4/30 RA 94%   ASSESSMENT/RECOMMENDATION/PLAN  Pulse ox acceptable   PROBLEM:  GPC BACTEREMIA 4/27  ASSESSMENT/RECOMMENDATION/PLAN  Vanco 1 (4/29) 4/27 bc CNS Likely contaminant Will DC vanco 4/29 bc n n  PROBLEM:  VANCO IN RENAL PATIENT   4/30 Vanco T 5.7  ASSESSMENT/RECOMMENDATION/PLAN Monitor lvl  Vanco DCed 4/30  PROBLEM:   PROTEUS M UTI (4/27 UC)   ASSESSMENT/RECOMMENDATION/PLAN On Zosyn (4/27) bacid (4/27)   PROBLEM:  ROSEMARIE  4/30 Cr 1.5  4/30 K 3.2  ASSESSMENT/RECOMMENDATION/PLAN On NS 75 (4/27) K replaced 4/30)   GLOBAL ISSUE/BEST PRACTICE:        PROBLEM:      Analgesia:        Tylenol (4/27)                      PROBLEM: Sedation:       na             PROBLEM: HOB elevation: yes               PROBLEM: Stress ulcer proph:     na                     PROBLEM: VTE prophylaxis:  hsc (4/27)                                 PROBLEM: Glycemic control:    na            PROBLEM: Nutrition:  ROQUE (4/28)                         PROBLEM: Advanced directive:  dnr                                TIME SPENT Over 25 minutes aggregate time spent on encounter; activities included   direct patient care, counseling and/or coordinating care reviewing notes, labs data/ imaging , discussion with multidisciplinary team.  Plan of care discussed with patient  and/family in detail who expressed understanding of the management plan . Risks, benefits, alternatives  discussed in detail. Questions/concerns  were addressed  to the best of my ability .

## 2017-04-30 NOTE — PROGRESS NOTE ADULT - PROBLEM SELECTOR PLAN 1
Admit for septic workup and ID evaluation,send blood and urine cx,serial lactate levels,monitor vitals closley,ivfs hydration,monitor urine output and renal profile,iv abx as per id cons PORTER eval reuested Family refused sten palacement and patient is currently on limited medical inteerventons ,continue abx for now D/w PALL CARE CONS Dr Cui ,R

## 2017-05-01 LAB
PF4 HEPARIN CMPLX AB SER-ACNC: NEGATIVE — SIGNIFICANT CHANGE UP
PF4 HEPARIN CMPLX AB SERPL QL IA: 0.19 ABS — SIGNIFICANT CHANGE UP

## 2017-05-01 RX ADMIN — TAMSULOSIN HYDROCHLORIDE 0.4 MILLIGRAM(S): 0.4 CAPSULE ORAL at 21:32

## 2017-05-01 RX ADMIN — PIPERACILLIN AND TAZOBACTAM 25 GRAM(S): 4; .5 INJECTION, POWDER, LYOPHILIZED, FOR SOLUTION INTRAVENOUS at 05:15

## 2017-05-01 RX ADMIN — PIPERACILLIN AND TAZOBACTAM 25 GRAM(S): 4; .5 INJECTION, POWDER, LYOPHILIZED, FOR SOLUTION INTRAVENOUS at 13:21

## 2017-05-01 RX ADMIN — SODIUM CHLORIDE 30 MILLILITER(S): 9 INJECTION INTRAMUSCULAR; INTRAVENOUS; SUBCUTANEOUS at 03:04

## 2017-05-01 RX ADMIN — HEPARIN SODIUM 5000 UNIT(S): 5000 INJECTION INTRAVENOUS; SUBCUTANEOUS at 17:12

## 2017-05-01 RX ADMIN — PIPERACILLIN AND TAZOBACTAM 25 GRAM(S): 4; .5 INJECTION, POWDER, LYOPHILIZED, FOR SOLUTION INTRAVENOUS at 21:32

## 2017-05-01 RX ADMIN — HEPARIN SODIUM 5000 UNIT(S): 5000 INJECTION INTRAVENOUS; SUBCUTANEOUS at 05:15

## 2017-05-01 NOTE — PROGRESS NOTE ADULT - ASSESSMENT
Pt is a 89 yo female ,[facility] resident with hx od CAD,HTN,HLD,DVT ,Breast ca,colon ca, s/p colostomy ,OA who presents to the ED with a cc of fever T max of 101.  Pt confused and unable  provide further history at this time Diagnosed with renal colic,found to have moderate to severe hydronephrosis with obstructing kidney stone and  eval called by ER Scheduled for OR in am for stent placement as per Dr Chang, urologist arrangement Admit for septic workup and ID evaluation,send blood and urine cx,serial lactate levels,monitor vitals closley,ivfs hydration,monitor urine output and renal profile,iv abx as per id cons Dr Bolden Palliative care consult requested due to overall poor prognosis,to discuss advance directives and complete MOLST

## 2017-05-01 NOTE — PROGRESS NOTE ADULT - PROBLEM SELECTOR PLAN 8
routine care as per protocol HCP refused surg consult ,she stated she will not consent for sx tx CMO disussed and hcp requests hospice eval for home hospice at halfway  vs inpatient hospice facilities placement

## 2017-05-01 NOTE — PROGRESS NOTE ADULT - ASSESSMENT
Obstructive uropathy and proteus uti, dementia, family has requested observation and no intervention at present and aware of recurrent uti/sepsis and death if stone continue s to obstruct, aware percutaneous and endoscopic stenting, but stone would need management, questions have been answered

## 2017-05-01 NOTE — PROGRESS NOTE ADULT - SUBJECTIVE AND OBJECTIVE BOX
Chief Complaint: AMS, fever    Interval Events: No events overnight. Sleeping.    Review of Systems:  General: No fevers, chills, weight loss or gain  Skin: No rashes, color changes  Cardiovascular: No chest pain, orthopnea  Respiratory: No shortness of breath, cough  Gastrointestinal: No nausea, abdominal pain  Genitourinary: No incontinence, pain with urination  Musculoskeletal: No pain, swelling, decreased range of motion  Neurological: No headache, weakness  Psychiatric: No depression, anxiety  Endocrine: No weight loss or gain, increased thirst  All other systems are comprehensively negative.    Physical Exam:  Vital Signs Last 24 Hrs  T(C): 37.9, Max: 37.9 (04-30 @ 12:38)  T(F): 100.3, Max: 100.3 (04-30 @ 12:38)  HR: 70 (70 - 70)  BP: 182/75 (182/75 - 182/75)  BP(mean): --  RR: 16 (16 - 16)  SpO2: 94% (94% - 94%)  General: NAD  Neck: No JVD, no carotid bruit  Lungs: bilateral wheezing  CV: RRR, nl S1/S2, no M/R/G  Abdomen: S/NT/ND, +BS  Extremities: No LE edema, no cyanosis    Labs:             04-30    146<H>  |  116<H>  |  19  ----------------------------<  109<H>  3.3<L>   |  18<L>  |  1.50<H>    Ca    7.8<L>      30 Apr 2017 06:25  Phos  1.6     04-30  Mg     1.9     04-30    TPro  5.9<L>  /  Alb  2.2<L>  /  TBili  0.4  /  DBili  x   /  AST  15  /  ALT  13  /  AlkPhos  62  04-30                          9.3    6.8   )-----------( 90       ( 30 Apr 2017 06:25 )             29.8

## 2017-05-01 NOTE — PROGRESS NOTE ADULT - SUBJECTIVE AND OBJECTIVE BOX
CHIEF COMPLAINT:   Chief Complaint: AMS, fever    Interval Events: No events overnight. Sleeping.      PAST MEDICAL & SURGICAL HISTORY:  Hypothyroidism: newly diagnosed, started on T4 4/2013  Osteoarthritis  Colon cancer: &gt;30 years ago  Breast CA: &quot;many years ago&quot;  DVT (deep venous thrombosis): left leg not on A/C  HLD (hyperlipidemia)  Dementia  History of hysterectomy  Colostomy in place  Colon perforation  Ileostomy in place      REVIEW OF SYSTEMS:    CONSTITUTIONAL: No weakness, fevers or chills  EYES/ENT: No visual changes;  No vertigo or throat pain   NECK: No pain or stiffness  RESPIRATORY: No cough, wheezing, hemoptysis; No shortness of breath  CARDIOVASCULAR: No chest pain or palpitations  GASTROINTESTINAL: No abdominal or epigastric pain. No nausea, vomiting, or hematemesis; No diarrhea or constipation. No melena or hematochezia. colostomy        MEDICATIONS  (STANDING):  heparin  Injectable 5000Unit(s) SubCutaneous every 12 hours  sodium chloride 0.9%. 1000milliLiter(s) IV Continuous <Continuous>  levothyroxine 100MICROGram(s) Oral daily  lactobacillus acidophilus 1Tablet(s) Oral two times a day with meals  tamsulosin 0.4milliGRAM(s) Oral at bedtime  lactulose Syrup 20Gram(s) Oral every other day  piperacillin/tazobactam IVPB. 3.375Gram(s) IV Intermittent every 8 hours    MEDICATIONS  (PRN):  acetaminophen   Tablet 650milliGRAM(s) Oral every 6 hours PRN For Temp greater than 38 C (100.4 F)  acetaminophen   Tablet. 650milliGRAM(s) Oral every 6 hours PRN Mild Pain (1 - 3)  morphine  - Injectable 2milliGRAM(s) IV Push every 4 hours PRN Moderate Pain (4 - 6)  ondansetron Injectable 4milliGRAM(s) IV Push every 6 hours PRN Nausea and/or Vomiting  acetaminophen  Suppository 650milliGRAM(s) Rectal every 6 hours PRN For Temp greater than 38 C (100.4 F)      Allergies    No Known Allergies    Intolerances        SOCIAL HISTORY:    FAMILY HISTORY:  No pertinent family history in first degree relatives      Vital Signs Last 24 Hrs  T(C): --  T(F): --  HR: --  BP: --  BP(mean): --  RR: --  SpO2: --    PHYSICAL EXAM:    Constitutional: , well-developed  HEENT: MAMTA, EOMI,     Respiratory: CTAB   Cardiovascular: S1 and S2, RRR, no M/G/R  Abd: BS+, soft, NT/ND, No CVAT, colostomy      LABS:                        9.3    6.8   )-----------( 90       ( 30 Apr 2017 06:25 )             29.8     04-30    146<H>  |  116<H>  |  19  ----------------------------<  109<H>  3.3<L>   |  18<L>  |  1.50<H>    Ca    7.8<L>      30 Apr 2017 06:25  Phos  1.6     04-30  Mg     1.9     04-30    TPro  5.9<L>  /  Alb  2.2<L>  /  TBili  0.4  /  DBili  x   /  AST  15  /  ALT  13  /  AlkPhos  62  04-30    PT/INR - ( 30 Apr 2017 06:25 )   PT: 16.1 sec;   INR: 1.47 ratio             Urine Culture:   Hemoglobin: 9.3 g/dL (04-30 @ 06:25)  Hematocrit: 29.8 % (04-30 @ 06:25)      RADIOLOGY & ADDITIONAL STUDIES:

## 2017-05-01 NOTE — PROGRESS NOTE ADULT - SUBJECTIVE AND OBJECTIVE BOX
Interval History:    CENTRAL LINE:   [  ] YES       [  ] NO  MCKEE:                 [  ] YES       [  ] NO         REVIEW OF SYSTEMS:  All Systems below were reviewed and are negative [  ]  HEENT:  ID:  Pulmonary:  Cardiac:  GI:  Renal:  Musculoskeletal:  All other systems above were reviewed and are negative   [  ]      MEDICATIONS  (STANDING):  heparin  Injectable 5000Unit(s) SubCutaneous every 12 hours  sodium chloride 0.9%. 1000milliLiter(s) IV Continuous <Continuous>  levothyroxine 100MICROGram(s) Oral daily  lactobacillus acidophilus 1Tablet(s) Oral two times a day with meals  tamsulosin 0.4milliGRAM(s) Oral at bedtime  lactulose Syrup 20Gram(s) Oral every other day  piperacillin/tazobactam IVPB. 3.375Gram(s) IV Intermittent every 8 hours    MEDICATIONS  (PRN):  acetaminophen   Tablet 650milliGRAM(s) Oral every 6 hours PRN For Temp greater than 38 C (100.4 F)  acetaminophen   Tablet. 650milliGRAM(s) Oral every 6 hours PRN Mild Pain (1 - 3)  morphine  - Injectable 2milliGRAM(s) IV Push every 4 hours PRN Moderate Pain (4 - 6)  ondansetron Injectable 4milliGRAM(s) IV Push every 6 hours PRN Nausea and/or Vomiting  acetaminophen  Suppository 650milliGRAM(s) Rectal every 6 hours PRN For Temp greater than 38 C (100.4 F)      Vital Signs Last 24 Hrs  T(C): --  T(F): --  HR: --  BP: --  BP(mean): --  RR: --  SpO2: --    I&O's Summary  I & Os for 24h ending 01 May 2017 07:00  =============================================  IN: 1470 ml / OUT: 750 ml / NET: 720 ml    I & Os for current day (as of 01 May 2017 21:28)  =============================================  IN: 700 ml / OUT: 525 ml / NET: 175 ml      PHYSICAL EXAM:  HEENT: NC/AT; PERRLA  Neck: Soft; no tenderness  Lungs: CTA bilaterally; no wheezing.   Heart:  Abdomen:  Genital/ Rectal:  Extremities:  Neurologic:  Vascular:      LABORATORY:    CBC Full  -  ( 30 Apr 2017 06:25 )  WBC Count : 6.8 K/uL  Hemoglobin : 9.3 g/dL  Hematocrit : 29.8 %  Platelet Count - Automated : 90 K/uL  Mean Cell Volume : 87.9 fl  Mean Cell Hemoglobin : 27.5 pg  Mean Cell Hemoglobin Concentration : 31.3 gm/dL  Auto Neutrophil # : x  Auto Lymphocyte # : x  Auto Monocyte # : x  Auto Eosinophil # : x  Auto Basophil # : x  Auto Neutrophil % : x  Auto Lymphocyte % : x  Auto Monocyte % : x  Auto Eosinophil % : x  Auto Basophil % : x          04-30    146<H>  |  116<H>  |  19  ----------------------------<  109<H>  3.3<L>   |  18<L>  |  1.50<H>    Ca    7.8<L>      30 Apr 2017 06:25  Phos  1.6     04-30  Mg     1.9     04-30    TPro  5.9<L>  /  Alb  2.2<L>  /  TBili  0.4  /  DBili  x   /  AST  15  /  ALT  13  /  AlkPhos  62  04-30          Assessment and Plan:          Pranay Bolden MD   [phone]. She is confused. No fevers noted.  Comfortable.      MEDICATIONS  (STANDING):  heparin  Injectable 5000Unit(s) SubCutaneous every 12 hours  sodium chloride 0.9%. 1000milliLiter(s) IV Continuous <Continuous>  levothyroxine 100MICROGram(s) Oral daily  lactobacillus acidophilus 1Tablet(s) Oral two times a day with meals  tamsulosin 0.4milliGRAM(s) Oral at bedtime  lactulose Syrup 20Gram(s) Oral every other day  piperacillin/tazobactam IVPB. 3.375Gram(s) IV Intermittent every 8 hours    MEDICATIONS  (PRN):  acetaminophen   Tablet 650milliGRAM(s) Oral every 6 hours PRN For Temp greater than 38 C (100.4 F)  acetaminophen   Tablet. 650milliGRAM(s) Oral every 6 hours PRN Mild Pain (1 - 3)  morphine  - Injectable 2milliGRAM(s) IV Push every 4 hours PRN Moderate Pain (4 - 6)  ondansetron Injectable 4milliGRAM(s) IV Push every 6 hours PRN Nausea and/or Vomiting  acetaminophen  Suppository 650milliGRAM(s) Rectal every 6 hours PRN For Temp greater than 38 C (100.4 F)      PHYSICAL EXAM:  HEENT: NC/AT; PERRLA  Neck: Soft; no tenderness  Lungs: CTA bilaterally; no wheezing.   Heart: RRR; no murmurs.  Abdomen: Soft; no masses.  Extremities: No ulcers or edema.  Neurologic: Confused.       LABORATORY:    CBC Full  -  ( 30 Apr 2017 06:25 )  WBC Count : 6.8 K/uL  Hemoglobin : 9.3 g/dL  Hematocrit : 29.8 %  Platelet Count - Automated : 90 K/uL  Mean Cell Volume : 87.9 fl  Mean Cell Hemoglobin : 27.5 pg  Mean Cell Hemoglobin Concentration : 31.3 gm/dL  Auto Neutrophil # : x  Auto Lymphocyte # : x  Auto Monocyte # : x  Auto Eosinophil # : x  Auto Basophil # : x  Auto Neutrophil % : x  Auto Lymphocyte % : x  Auto Monocyte % : x  Auto Eosinophil % : x  Auto Basophil % : x          04-30    146<H>  |  116<H>  |  19  ----------------------------<  109<H>  3.3<L>   |  18<L>  |  1.50<H>    Ca    7.8<L>      30 Apr 2017 06:25  Phos  1.6     04-30  Mg     1.9     04-30    TPro  5.9<L>  /  Alb  2.2<L>  /  TBili  0.4  /  DBili  x   /  AST  15  /  ALT  13  /  AlkPhos  62  04-30      Assessment and Plan:    1. UTI   2. Marked left hydronephrosis.  3. Obstructed left renal stones.    . Family refused ureteral stent placement.   . Waiting for hospice evaluation.  . Continue IV Zosyn for now.   . Will consider stopping antibiotics if the patient is on hospice.           Pranay Bolden MD   [phone].

## 2017-05-01 NOTE — PROGRESS NOTE ADULT - ASSESSMENT
The patient is a 89 year old female with a history of HL, DVT, breast cancer, colon cancer s/p colostomy, hypothyroidism, dementia who is admitted with urosepsis.    Plan:  - Antibiotics and IVF for UTI  - No plans for urologic intervention at this time as per family  - Awaiting speciation of blood cultures  - Possible transition to comfort care

## 2017-05-01 NOTE — DIETITIAN INITIAL EVALUATION ADULT. - OTHER INFO
patient for hospice care comfort care only. per nursing assistant did not want to eat today. seen on mechanical soft diet

## 2017-05-01 NOTE — PROGRESS NOTE ADULT - SUBJECTIVE AND OBJECTIVE BOX
PROGRESS NOTE    OVERNIGHT    SUBJECTIVE    PAST MEDICAL & SURGICAL HISTORY:  Hypothyroidism: newly diagnosed, started on T4 4/2013  Osteoarthritis  Colon cancer: &gt;30 years ago  Breast CA: &quot;many years ago&quot;  DVT (deep venous thrombosis): left leg not on A/C  HLD (hyperlipidemia)  Dementia  History of hysterectomy  Colostomy in place  Colon perforation  Ileostomy in place    HEALTH ISSUES - PROBLEM Dx:  Goals of care, counseling/discussion: Goals of care, counseling/discussion  Hydronephrosis with urinary obstruction due to ureteral calculus: Hydronephrosis with urinary obstruction due to ureteral calculus  Sepsis, due to unspecified organism: Sepsis, due to unspecified organism  Dementia without behavioral disturbance, unspecified dementia type: Dementia without behavioral disturbance, unspecified dementia type  HLD (hyperlipidemia): HLD (hyperlipidemia)  Colostomy in place: Colostomy in place  Dementia: Dementia  Osteoarthritis: Osteoarthritis  DVT (deep venous thrombosis): DVT (deep venous thrombosis)  Hypothyroidism: Hypothyroidism  Heart murmur: Heart murmur  Nephrolithiasis: Nephrolithiasis  Urinary tract infection with hematuria, site unspecified: Urinary tract infection with hematuria, site unspecified        FAMILY HISTORY:  No pertinent family history in first degree relatives    Allergies    No Known Allergies    Intolerances        MEDICATIONS  (STANDING):  heparin  Injectable 5000Unit(s) SubCutaneous every 12 hours  sodium chloride 0.9%. 1000milliLiter(s) IV Continuous <Continuous>  levothyroxine 100MICROGram(s) Oral daily  lactobacillus acidophilus 1Tablet(s) Oral two times a day with meals  tamsulosin 0.4milliGRAM(s) Oral at bedtime  lactulose Syrup 20Gram(s) Oral every other day  piperacillin/tazobactam IVPB. 3.375Gram(s) IV Intermittent every 8 hours    MEDICATIONS  (PRN):  acetaminophen   Tablet 650milliGRAM(s) Oral every 6 hours PRN For Temp greater than 38 C (100.4 F)  acetaminophen   Tablet. 650milliGRAM(s) Oral every 6 hours PRN Mild Pain (1 - 3)  morphine  - Injectable 2milliGRAM(s) IV Push every 4 hours PRN Moderate Pain (4 - 6)  ondansetron Injectable 4milliGRAM(s) IV Push every 6 hours PRN Nausea and/or Vomiting  acetaminophen  Suppository 650milliGRAM(s) Rectal every 6 hours PRN For Temp greater than 38 C (100.4 F)      OBJECTIVE  PHYSICAL EXAM:  T(C): 37.9, Max: 37.9 (04-30 @ 12:38)  HR: 70 (70 - 70)  BP: 182/75 (182/75 - 182/75)  RR: 16 (16 - 16)  SpO2: 94% (94% - 94%)  Wt(kg): --  I&O's Summary  I & Os for 24h ending 01 May 2017 07:00  =============================================  IN: 1470 ml / OUT: 750 ml / NET: 720 ml    I & Os for current day (as of 01 May 2017 12:28)  =============================================  IN: 0 ml / OUT: 150 ml / NET: -150 ml      Appearance: NAD.   HEENT:   Moist oral mucosa. Conjunctiva clear b/l.   Lymphatic: No cervical lymphadenopathy  Cardiovascular: RRR with no m/r/g.  Respiratory: Lungs CTAB.  Gastrointestinal:  Soft, nontender. No rebound. No rigidity. 	  Skin: No rashes, No ecchymoses, No cyanosis.	  Neurologic: Non-focal. Moving all extremities. Following commands.  Extremities: No edema. No erythema. No calf tenderness. Malvin's negative.  Vascular: DP 2+ b/l.  	  LABS:                        9.3    6.8   )-----------( 90       ( 30 Apr 2017 06:25 )             29.8     04-30    146<H>  |  116<H>  |  19  ----------------------------<  109<H>  3.3<L>   |  18<L>  |  1.50<H>    Ca    7.8<L>      30 Apr 2017 06:25  Phos  1.6     04-30  Mg     1.9     04-30    TPro  5.9<L>  /  Alb  2.2<L>  /  TBili  0.4  /  DBili  x   /  AST  15  /  ALT  13  /  AlkPhos  62  04-30    PT/INR - ( 30 Apr 2017 06:25 )   PT: 16.1 sec;   INR: 1.47 ratio               RADIOLOGY & ADDITIONAL TESTS:    ASSESSMENT/PLAN: PROGRESS NOTE    OVERNIGHT  No new events reported Hospice eval is pending   SUBJECTIVE  Resting in abed comfortably ,lethargic ,confused ,poor mentation   PAST MEDICAL & SURGICAL HISTORY:  Hypothyroidism: newly diagnosed, started on T4 4/2013  Osteoarthritis  Colon cancer: &gt;30 years ago  Breast CA: &quot;many years ago&quot;  DVT (deep venous thrombosis): left leg not on A/C  HLD (hyperlipidemia)  Dementia  History of hysterectomy  Colostomy in place  Colon perforation  Ileostomy in place    HEALTH ISSUES - PROBLEM Dx:  Goals of care, counseling/discussion: Goals of care, counseling/discussion  Hydronephrosis with urinary obstruction due to ureteral calculus: Hydronephrosis with urinary obstruction due to ureteral calculus  Sepsis, due to unspecified organism: Sepsis, due to unspecified organism  Dementia without behavioral disturbance, unspecified dementia type: Dementia without behavioral disturbance, unspecified dementia type  HLD (hyperlipidemia): HLD (hyperlipidemia)  Colostomy in place: Colostomy in place  Dementia: Dementia  Osteoarthritis: Osteoarthritis  DVT (deep venous thrombosis): DVT (deep venous thrombosis)  Hypothyroidism: Hypothyroidism  Heart murmur: Heart murmur  Nephrolithiasis: Nephrolithiasis  Urinary tract infection with hematuria, site unspecified: Urinary tract infection with hematuria, site unspecified        FAMILY HISTORY:  No pertinent family history in first degree relatives    Allergies    No Known Allergies    Intolerances        MEDICATIONS  (STANDING):  heparin  Injectable 5000Unit(s) SubCutaneous every 12 hours  sodium chloride 0.9%. 1000milliLiter(s) IV Continuous <Continuous>  levothyroxine 100MICROGram(s) Oral daily  lactobacillus acidophilus 1Tablet(s) Oral two times a day with meals  tamsulosin 0.4milliGRAM(s) Oral at bedtime  lactulose Syrup 20Gram(s) Oral every other day  piperacillin/tazobactam IVPB. 3.375Gram(s) IV Intermittent every 8 hours    MEDICATIONS  (PRN):  acetaminophen   Tablet 650milliGRAM(s) Oral every 6 hours PRN For Temp greater than 38 C (100.4 F)  acetaminophen   Tablet. 650milliGRAM(s) Oral every 6 hours PRN Mild Pain (1 - 3)  morphine  - Injectable 2milliGRAM(s) IV Push every 4 hours PRN Moderate Pain (4 - 6)  ondansetron Injectable 4milliGRAM(s) IV Push every 6 hours PRN Nausea and/or Vomiting  acetaminophen  Suppository 650milliGRAM(s) Rectal every 6 hours PRN For Temp greater than 38 C (100.4 F)      OBJECTIVE  PHYSICAL EXAM:  T(C): 37.9, Max: 37.9 (04-30 @ 12:38)  HR: 70 (70 - 70)  BP: 182/75 (182/75 - 182/75)  RR: 16 (16 - 16)  SpO2: 94% (94% - 94%)  Wt(kg): --  I&O's Summary  I & Os for 24h ending 01 May 2017 07:00  =============================================  IN: 1470 ml / OUT: 750 ml / NET: 720 ml    I & Os for current day (as of 01 May 2017 12:28)  =============================================  IN: 0 ml / OUT: 150 ml / NET: -150 ml      Appearance: NAD. VSS  HEENT:   Moist oral mucosa. Conjunctiva clear b/l.   Lymphatic: No cervical lymphadenopathy  Cardiovascular: RRR with no m/r/g.  Respiratory: Lungs CTAB.  Gastrointestinal:  Soft, nontender. No rebound. No rigidity. Colostomy -bowel loops in a bag ,had fecal liquid material about 150 cc this morning in bag 	  Skin: No rashes, No ecchymoses, No cyanosis.	  Neurologic: Non-focal. Moving all extremities. Following commands.  Extremities: No edema. No erythema. No calf tenderness. Malvin's negative.  Vascular: DP 2+ b/l.  	  LABS:                        9.3    6.8   )-----------( 90       ( 30 Apr 2017 06:25 )             29.8     04-30    146<H>  |  116<H>  |  19  ----------------------------<  109<H>  3.3<L>   |  18<L>  |  1.50<H>    Ca    7.8<L>      30 Apr 2017 06:25  Phos  1.6     04-30  Mg     1.9     04-30    TPro  5.9<L>  /  Alb  2.2<L>  /  TBili  0.4  /  DBili  x   /  AST  15  /  ALT  13  /  AlkPhos  62  04-30    PT/INR - ( 30 Apr 2017 06:25 )   PT: 16.1 sec;   INR: 1.47 ratio               RADIOLOGY & ADDITIONAL TESTS:    ASSESSMENT/PLAN:

## 2017-05-01 NOTE — PROGRESS NOTE ADULT - PROBLEM SELECTOR PLAN 1
Josef (HCP) has opted for conservative treatment with iv antibiotics for treatment of sepsis. She doesn't not want any urological interventions for hydronephrosis. In light of the GI findings today (loops of bowel in the colostomy), josfe wants comfort measures only. She wants hospice referral.

## 2017-05-01 NOTE — PROGRESS NOTE ADULT - SUBJECTIVE AND OBJECTIVE BOX
DATE  5/1/2017      Patient seen Plan discussed/Questions answered (with patient/ancillary staff/colleagues) Chart notes reviewd More detailed Pulm/Critical Care notes, assessment and plan  will be added later today  (to current note) after reviewing labs problem list     Remarkable Interval history if any elicited is noted  Family pt refused stent on zosyn for hospice placement pulm status is optimal     ROS/PE   REVIEW OF SYMPTOMS    Able to give ROS  Yes     RELIABLE NO   Weakness Yes   Chills No   Vision changes No  Lymph nodes No enlarged glands   Endocrine No unexplained hair loss No het or cold intolerance   Allergy No hives   Sore throat No   Coughing blood No  Headache No  Confusion YES  Chest pain No  Palpitations No   Pain abdomen NO   Shortness of breath YES  Vomiting NO  Pain neck No  Pain abdomen NO     PHYSICAL EXAM    HEENT Unremarkable PERRLA atraumatic  RESP Fair air entry EXP prolonged    Harsh breath sound Resp distres mild  CARDIAC S1 S2 No S3     NO JVD   Awake Alert and ORIENTED x 2  ABDOMEN SOFT BS PRESENT NOT DISTENDED No hepatosplenomegaly  PEDAL EDEMA present No calf tenderness  NO rash GENERAL Not TOXIC looking  . DATE  5/1/2017      Patient seen Plan discussed/Questions answered (with patient/ancillary staff/colleagues) Chart notes reviewd More detailed Pulm/Critical Care notes, assessment and plan  will be added later today  (to current note) after reviewing labs problem list     Remarkable Interval history if any elicited is noted  Family pt refused stent on zosyn for hospice placement pulm status is optimal     ROS/PE   REVIEW OF SYMPTOMS    Able to give ROS  Yes     RELIABLE NO   Weakness Yes   Chills No   Vision changes No  Lymph nodes No enlarged glands   Endocrine No unexplained hair loss No het or cold intolerance   Allergy No hives   Sore throat No   Coughing blood No  Headache No  Confusion YES  Chest pain No  Palpitations No   Pain abdomen NO   Shortness of breath YES  Vomiting NO  Pain neck No  Pain abdomen NO     PHYSICAL EXAM    HEENT Unremarkable PERRLA atraumatic  RESP Fair air entry EXP prolonged    Harsh breath sound Resp distres mild  CARDIAC S1 S2 No S3     NO JVD   Awake Alert and ORIENTED x 2  ABDOMEN SOFT BS PRESENT NOT DISTENDED No hepatosplenomegaly  PEDAL EDEMA present No calf tenderness  NO rash GENERAL Not TOXIC looking  . PATIENT DESCRIPTION  89 yo female ,shelter resident PMH CAD,HTN,HLD,DVT ,Breast ca,colon ca, s/p colostomy ,OA admited [facility] 4/27/2017 with renal colic, severe hydronephrosis with obstructing kidney stone  Pulm consulted 4/28 as she was hypoxic and has is opac on CXR   HOSPITAL COURSE   89 f admitted 4/27 with L obstr hydro sec renal stones but family declined invasive intervention Rxed zosyn (4/27) for UTI  (4/27 uc 100k Proteus m)  and pneumonia On  4/27 bc gpc c  Vanco added to zosyn on 4/29  Creat elevated 1.6 (ROSEMARIE) 4/29 BC n 4/27 bc grew CNS single set likely contaminant Vanco Dced 4/30 Hospice referral being planned  RECENT EVENTS   5/1/2017For hospice placmnt   VITALS/LABS  5/1/2017 1003 70 70 180/70 16 94%   4/30/2017 W 6.8 Hb 9.3 Plt 90 INR 1,47 Na 146 K 3.3 CO2 18 Cr 1.5 PO4 1.6   PROBLEM/ASSESSMENT/PLAN  PROBLEM: ACUTE HYPOXIC RESP FAILURE   5/1 3l 94%   ASSESSMENT/RECOMMENDATION/PLAN Pulse ox acceptable   PROBLEM:  RULE OUT VENOUS THROMBOEMBOLISM  4/28 Combative pt Only rle could be done No DVT Rle   ASSESSMENT/RECOMMENDATION/PLAN Has HO L leg DVT DVT P if no surgery  PROBLEM:  PROTEUS UTI  4/28-4/30 W 5.4-6.8  4/27 uc Proetus   4/27 UA Over 50 W [place] bld Many bact   ASSESSMENT/RECOMMENDATION/PLAN On zosyn (4/27) lactobacillus (4/27)   PROBLEM:  RO PNEUMONIA  4/27 CXR IS changes lung l  ASSESSMENT/RECOMMENDATION/PLAN On zosyn (4/27)   PROBLEM:  GRAM POSITIVE BACTEREMIA   ASSESSMENT/RECOMMENDATION/PLAN Likely contaminant Vanco DCed  PROBLEM:  LACTICEMIA /27-4/28 LA 2.4-1.8-.9   ASSESSMENT/RECOMMENDATION/PLAN Improved  PROBLEM:  L HYDRONEPHROSIS   4/27 CTAP Marked L obstr hydro sec multiple l renal pelvic calculi   ASSESSMENT/RECOMMENDATION/PLAN 4/28 Dr JIMENEZ discussed patient's condition in detail with niece who is health care proxy (Anahi Vivar) who did not want any intervention with cysto/stent or percutaneous nephrostomy, rather she requested medical management at this time,  PROBLEM: ROSEMARIE    2/24-4/27-4/28-4/30 Cr .95-1.9-1.6-1.6-1.5  ASSESSMENT/RECOMMENDATION/PLAN On NS 30 (4/27)  PROBLEM:  BPH   ASSESSMENT/RECOMMENDATION/PLAN On Tamsulosin .4 (4/27)   PROBLEM:  ANEMIA Hb drop   2/24-4/27-4/28-4/29-4/20 Hb 11.8-11.2-9.8-9.2-9.3  ASSESSMENT/RECOMMENDATION/PLAN Drop may in part be sec to ivf  PROBLEM:   THROMBOCYTOPENIA   2/24-4/27-4/28  Plt 108-114-81  ASSESSMENT/RECOMMENDATION/PLAN Likely reduced sec sepsis   GLOBAL ISSUE/BEST PRACTICE:        PROBLEM:      Analgesia:        Tylenol (4/27)                      PROBLEM: Sedation:       na             PROBLEM: HOB elevation: yes               PROBLEM: Stress ulcer proph:     na                     PROBLEM: VTE prophylaxis:  hsc (4/27)                                 PROBLEM: Glycemic control:    na            PROBLEM: Nutrition:  ROQUE (4/28)                         PROBLEM: Advanced directive:  dnr                                TIME SPENT Over 25 minutes aggregate time spent on encounter; activities included   direct patient care, counseling and/or coordinating care reviewing notes, labs data/ imaging , discussion with multidisciplinary team.  Plan of care discussed with patient  and/family in detail who expressed understanding of the management plan . Risks, benefits, alternatives  discussed in detail. Questions/concerns  were addressed  to the best of my ability .

## 2017-05-02 DIAGNOSIS — K94.09 OTHER COMPLICATIONS OF COLOSTOMY: ICD-10-CM

## 2017-05-02 LAB
CULTURE RESULTS: SIGNIFICANT CHANGE UP
SPECIMEN SOURCE: SIGNIFICANT CHANGE UP

## 2017-05-02 RX ADMIN — Medication 1 TABLET(S): at 18:01

## 2017-05-02 RX ADMIN — Medication 100 MICROGRAM(S): at 06:05

## 2017-05-02 RX ADMIN — TAMSULOSIN HYDROCHLORIDE 0.4 MILLIGRAM(S): 0.4 CAPSULE ORAL at 21:24

## 2017-05-02 RX ADMIN — HEPARIN SODIUM 5000 UNIT(S): 5000 INJECTION INTRAVENOUS; SUBCUTANEOUS at 18:01

## 2017-05-02 RX ADMIN — PIPERACILLIN AND TAZOBACTAM 25 GRAM(S): 4; .5 INJECTION, POWDER, LYOPHILIZED, FOR SOLUTION INTRAVENOUS at 06:05

## 2017-05-02 RX ADMIN — HEPARIN SODIUM 5000 UNIT(S): 5000 INJECTION INTRAVENOUS; SUBCUTANEOUS at 06:05

## 2017-05-02 NOTE — PROGRESS NOTE ADULT - PROBLEM SELECTOR PLAN 8
routine care as per protocol HCP refused surg consult ,she stated she will not consent for sx tx CMO disussed and hcp requests hospice eval for home hospice at [facility]  vs inpatient hospice facilities placement routine care as per protocol HCP refused surg consult ,she stated she will not consent for sx tx CMO disussed and hcp requests hospice eval for home hospice at [facility]  vs inpatient hospice facilities placement CASE disussed with Dr Cui

## 2017-05-02 NOTE — PROGRESS NOTE ADULT - SUBJECTIVE AND OBJECTIVE BOX
Chief Complaint: AMS, fever    Interval Events: No events overnight.    Review of Systems:  General: No fevers, chills, weight loss or gain  Skin: No rashes, color changes  Cardiovascular: No chest pain, orthopnea  Respiratory: No shortness of breath, cough  Gastrointestinal: No nausea, abdominal pain  Genitourinary: No incontinence, pain with urination  Musculoskeletal: No pain, swelling, decreased range of motion  Neurological: No headache, weakness  Psychiatric: No depression, anxiety  Endocrine: No weight loss or gain, increased thirst  All other systems are comprehensively negative.    Physical Exam:  Vital Signs Last 24 Hrs  T(C): --  T(F): --  HR: --  BP: --  BP(mean): --  RR: --  SpO2: --  General: NAD  Neck: No JVD, no carotid bruit  Lungs: bilateral wheezing  CV: RRR, nl S1/S2, no M/R/G  Abdomen: S/NT/ND, +BS  Extremities: No LE edema, no cyanosis

## 2017-05-02 NOTE — PROGRESS NOTE ADULT - ASSESSMENT
The patient is a 89 year old female with a history of HL, DVT, breast cancer, colon cancer s/p colostomy, hypothyroidism, dementia who is admitted with urosepsis.    Plan:  - On zosyn  - No plans for urologic intervention at this time as per family  - Hospice evaluation

## 2017-05-02 NOTE — PROGRESS NOTE ADULT - ASSESSMENT
Pt is a 89 yo female ,[facility] resident with hx od CAD,HTN,HLD,DVT ,Breast ca,colon ca, s/p colostomy ,OA who presents to the ED with a cc of fever T max of 101.  Pt confused and unable  provide further history at this time Diagnosed with renal colic,found to have moderate to severe hydronephrosis with obstructing kidney stone and  eval called by ER Scheduled for OR in am for stent placement as per Dr Chang, urologist arrangement Admit for septic workup and ID evaluation,send blood and urine cx,serial lactate levels,monitor vitals closley,ivfs hydration,monitor urine output and renal profile,iv abx as per id cons Dr Bolden Palliative care consult requested due to overall poor prognosis,to discuss advance directives and complete MOLST Pt 89 yo F p/w was seen at wound are center today for followup with Dr Rock for tx of sacral/buttock wound , when she began to complain of diffuse mid abd pain and nausea. Pt with some dry heaves. No diarrhea. No cp/sob/palp. No neck / back pain. No dysuria / hematuria. No recent trauma. No agg/allev factors. Pt with no acute pain past few dayst. Pt with recent inj, had neg CT abd ~ 2 weeks ago. No agg/allev factors. No other inj or co CT abdomen was perfomed and demonstrated nephrolithiasis with mild to moderate hydronephrosis (9 mm  L kidney stone ) , eval requested by er and patient is scheduled for stent placement by urologist Dr Chang Card clearance requested due to hx of CAD/MI Found to be hypokalemic and potassium is replaced  Also found to have cellulitis of LLE Admit for septic workup and ID evaluation,send blood and urine cx,serial lactate levels,monitor vitals closley,ivfs hydration,monitor urine output and renal profile,iv abx as per id cons Dr Bolden Palliative care consult requested to discuss advance directives and complete [facility] Pt is a 89 yo female ,[facility] resident with hx od CAD,HTN,HLD,DVT ,Breast ca,colon ca, s/p colostomy ,OA who presents to the ED with a cc of fever T max of 101.  Pt confused and unable  provide further history at this time Diagnosed with renal colic,found to have moderate to severe hydronephrosis with obstructing kidney stone and  eval called by ER Scheduled for OR in am for stent placement as per Dr Chang, urologist arrangement Admit for septic workup and ID evaluation,send blood and urine cx,serial lactate levels,monitor vitals closley,ivfs hydration,monitor urine output and renal profile,iv abx as per id cons Dr Bolden Palliative care consult requested due to overall poor prognosis,to discuss advance directives and complete MOLST

## 2017-05-02 NOTE — PROGRESS NOTE ADULT - PROBLEM SELECTOR PLAN 1
Nijanell (HCP) has opted for conservative treatment with iv antibiotics for treatment of sepsis. She doesn't not want any urological interventions for hydronephrosis. In light of the GI findings today (loops of bowel in the colostomy), niece wants comfort measures only and no surgical intervention. Patient has been accepted to hospice in assisted living. Will switch patient to po abx. Patient appears comfortable and NAD. Spoke to nijanell at bedside today.

## 2017-05-02 NOTE — PROGRESS NOTE ADULT - PROBLEM SELECTOR PLAN 6
pain management ,pt eval pain management ,pt eval Hospice evaluation requested due to poor prognosis .Case discussed with palliative care representative,comfort care and palliative measures seem to be appropriate level of care at this point

## 2017-05-02 NOTE — PROGRESS NOTE ADULT - SUBJECTIVE AND OBJECTIVE BOX
Patient is a 89y old  Female who presents with a chief complaint of fever (27 Apr 2017 23:45)      Subjective: Patient seen and examined at bedside. No acute events overnight.     PAIN:0   	  DYSPNEA:n  NAUS/VOM: n  SECRETIONS: n  AGITATION: n    OTHER REVIEW OF SYSTEMS:  UNABLE TO OBTAIN  due to: confusion    T(C): --  HR: --  BP: --  RR: --  SpO2: --  Wt(kg): --  GENERAL:  year old female/male resting comfortably in NAD  HEENT:  NC/AT EOMI PERRL  NECK: supple no JVD  CVS:  +S1 S2 RRR  RESP: CTA B/L  GI:  soft prolapsed ostomy  : no suprapubic tenderness  MUSC:  no lower extremity edema  NEURO:  confused  PSYCH:  Alert, Calm, Cooperative   SKIN:  no rashes       MEDS REVIEWED	            No Known Allergies      LABS REVIEWED:            IMAGING REVIEWED    ADVANCED DIRECTIVES:        DNR     DNI          MOLST    PSYCHOSOCIAL-SPIRITUAL ASSESSMENT:    ___Reviewed     _x__Care  plan unchanged     ___Care plan adjusted as below    GOALS OF CARE DISCUSSION  	_x__Palliative care info/counseling provided	    ___Family meeting  	__x_Advanced Directives addressed	    _x__See previous Palliative Medicine Note    AGENCY CHOICE DISCUSSED:   _x__HOSPICE   ___CALVARY  ___OTHER:

## 2017-05-02 NOTE — PROGRESS NOTE ADULT - PROBLEM SELECTOR PLAN 7
continue current managmnet and medications Palliative care consult followup due to overall poor prognosis, Hospice care discussion continue current managmnet and medications Palliative care consult followup due to overall poor prognosis, Hospice care discussion Hospice evaluation requested due to poor prognosis .Case discussed with palliative care representative,comfort care and palliative measures seem to be appropriate level of care at this point

## 2017-05-02 NOTE — PROGRESS NOTE ADULT - PROBLEM SELECTOR PLAN 2
continue current managmnet and medications Palliative care consult followup due to overall poor prognosis,to discuss hospice eval continue current managmnet and medications Palliative care consult followup due to overall poor prognosis,to discuss hospice eval Hospice evaluation requested due to poor prognosis .Case discussed with palliative care representative,comfort care and palliative measures seem to be appropriate level of care at this point

## 2017-05-02 NOTE — PROGRESS NOTE ADULT - PROBLEM SELECTOR PLAN 2
Spoke to niece at bedside. She does not want any intervention. Spoke with ostomy nurse. She will arrange to get larger bag for ostomy so patient can be transferred to assisted living on hospice with supplies.

## 2017-05-02 NOTE — PROGRESS NOTE ADULT - SUBJECTIVE AND OBJECTIVE BOX
PROGRESS NOTE    OVERNIGHT No new issues reported     SUBJECTIVE NAD VSS    PAST MEDICAL & SURGICAL HISTORY:  Hypothyroidism: newly diagnosed, started on T4 4/2013  Osteoarthritis  Colon cancer: &gt;30 years ago  Breast CA: &quot;many years ago&quot;  DVT (deep venous thrombosis): left leg not on A/C  HLD (hyperlipidemia)  Dementia  History of hysterectomy  Colostomy in place  Colon perforation  Ileostomy in place    HEALTH ISSUES - PROBLEM Dx:  Goals of care, counseling/discussion: Goals of care, counseling/discussion  Hydronephrosis with urinary obstruction due to ureteral calculus: Hydronephrosis with urinary obstruction due to ureteral calculus  Sepsis, due to unspecified organism: Sepsis, due to unspecified organism  Dementia without behavioral disturbance, unspecified dementia type: Dementia without behavioral disturbance, unspecified dementia type  HLD (hyperlipidemia): HLD (hyperlipidemia)  Colostomy in place: Colostomy in place  Dementia: Dementia  Osteoarthritis: Osteoarthritis  DVT (deep venous thrombosis): DVT (deep venous thrombosis)  Hypothyroidism: Hypothyroidism  Heart murmur: Heart murmur  Nephrolithiasis: Nephrolithiasis  Urinary tract infection with hematuria, site unspecified: Urinary tract infection with hematuria, site unspecified        FAMILY HISTORY:  No pertinent family history in first degree relatives    Allergies    No Known Allergies    Intolerances        MEDICATIONS  (STANDING):  heparin  Injectable 5000Unit(s) SubCutaneous every 12 hours  sodium chloride 0.9%. 1000milliLiter(s) IV Continuous <Continuous>  levothyroxine 100MICROGram(s) Oral daily  lactobacillus acidophilus 1Tablet(s) Oral two times a day with meals  tamsulosin 0.4milliGRAM(s) Oral at bedtime  lactulose Syrup 20Gram(s) Oral every other day  piperacillin/tazobactam IVPB. 3.375Gram(s) IV Intermittent every 8 hours    MEDICATIONS  (PRN):  acetaminophen   Tablet 650milliGRAM(s) Oral every 6 hours PRN For Temp greater than 38 C (100.4 F)  acetaminophen   Tablet. 650milliGRAM(s) Oral every 6 hours PRN Mild Pain (1 - 3)  morphine  - Injectable 2milliGRAM(s) IV Push every 4 hours PRN Moderate Pain (4 - 6)  ondansetron Injectable 4milliGRAM(s) IV Push every 6 hours PRN Nausea and/or Vomiting  acetaminophen  Suppository 650milliGRAM(s) Rectal every 6 hours PRN For Temp greater than 38 C (100.4 F)      OBJECTIVE  PHYSICAL EXAM:  T(C): --  HR: --  BP: --  RR: --  SpO2: --  Wt(kg): --  I&O's Summary  I & Os for 24h ending 02 May 2017 07:00  =============================================  IN: 700 ml / OUT: 525 ml / NET: 175 ml    I & Os for current day (as of 02 May 2017 12:08)  =============================================  IN: 100 ml / OUT: 0 ml / NET: 100 ml      Appearance: NAD.   HEENT:   Moist oral mucosa. Conjunctiva clear b/l.   Lymphatic: No cervical lymphadenopathy  Cardiovascular: RRR with no m/r/g.  Respiratory: Lungs CTAB.  Gastrointestinal:  Soft, nontender. No rebound. No rigidity. 	  Skin: No rashes, No ecchymoses, No cyanosis.	  Neurologic: Non-focal. Moving all extremities. Following commands.  Extremities: No edema. No erythema. No calf tenderness. Malvin's negative.  Vascular: DP 2+ b/l.  	  LABS:                RADIOLOGY & ADDITIONAL TESTS:    ASSESSMENT/PLAN: PROGRESS NOTE    OVERNIGHT No new issues reported     SUBJECTIVE NAD VSS Confused poor mentation     PAST MEDICAL & SURGICAL HISTORY:  Hypothyroidism: newly diagnosed, started on T4 4/2013  Osteoarthritis  Colon cancer: &gt;30 years ago  Breast CA: &quot;many years ago&quot;  DVT (deep venous thrombosis): left leg not on A/C  HLD (hyperlipidemia)  Dementia  History of hysterectomy  Colostomy in place  Colon perforation  Ileostomy in place    HEALTH ISSUES - PROBLEM Dx:  Goals of care, counseling/discussion: Goals of care, counseling/discussion  Hydronephrosis with urinary obstruction due to ureteral calculus: Hydronephrosis with urinary obstruction due to ureteral calculus  Sepsis, due to unspecified organism: Sepsis, due to unspecified organism  Dementia without behavioral disturbance, unspecified dementia type: Dementia without behavioral disturbance, unspecified dementia type  HLD (hyperlipidemia): HLD (hyperlipidemia)  Colostomy in place: Colostomy in place  Dementia: Dementia  Osteoarthritis: Osteoarthritis  DVT (deep venous thrombosis): DVT (deep venous thrombosis)  Hypothyroidism: Hypothyroidism  Heart murmur: Heart murmur  Nephrolithiasis: Nephrolithiasis  Urinary tract infection with hematuria, site unspecified: Urinary tract infection with hematuria, site unspecified        FAMILY HISTORY:  No pertinent family history in first degree relatives    Allergies    No Known Allergies    Intolerances        MEDICATIONS  (STANDING):  heparin  Injectable 5000Unit(s) SubCutaneous every 12 hours  sodium chloride 0.9%. 1000milliLiter(s) IV Continuous <Continuous>  levothyroxine 100MICROGram(s) Oral daily  lactobacillus acidophilus 1Tablet(s) Oral two times a day with meals  tamsulosin 0.4milliGRAM(s) Oral at bedtime  lactulose Syrup 20Gram(s) Oral every other day  piperacillin/tazobactam IVPB. 3.375Gram(s) IV Intermittent every 8 hours    MEDICATIONS  (PRN):  acetaminophen   Tablet 650milliGRAM(s) Oral every 6 hours PRN For Temp greater than 38 C (100.4 F)  acetaminophen   Tablet. 650milliGRAM(s) Oral every 6 hours PRN Mild Pain (1 - 3)  morphine  - Injectable 2milliGRAM(s) IV Push every 4 hours PRN Moderate Pain (4 - 6)  ondansetron Injectable 4milliGRAM(s) IV Push every 6 hours PRN Nausea and/or Vomiting  acetaminophen  Suppository 650milliGRAM(s) Rectal every 6 hours PRN For Temp greater than 38 C (100.4 F)      OBJECTIVE  PHYSICAL EXAM:  T(C): --  HR: --  BP: --  RR: --  SpO2: --  Wt(kg): --  I&O's Summary  I & Os for 24h ending 02 May 2017 07:00  =============================================  IN: 700 ml / OUT: 525 ml / NET: 175 ml    I & Os for current day (as of 02 May 2017 12:08)  =============================================  IN: 100 ml / OUT: 0 ml / NET: 100 ml      Appearance: NAD.   HEENT:   Moist oral mucosa. Conjunctiva clear b/l.   Lymphatic: No cervical lymphadenopathy  Cardiovascular: RRR with no m/r/g.  Respiratory: Lungs CTAB.  Gastrointestinal:  Soft, nontender. No rebound. Colodtaomy -the same appearance ,bowel loops with fecal material 	  Skin: No rashes, No ecchymoses, No cyanosis.	  Neurologic: Non-focal. Moving all extremities. Following commands.  Extremities: No edema. No erythema. No calf tenderness. Malvin's negative.  Vascular: DP 2+ b/l.  	  LABS:                RADIOLOGY & ADDITIONAL TESTS:    ASSESSMENT/PLAN:

## 2017-05-03 ENCOUNTER — TRANSCRIPTION ENCOUNTER (OUTPATIENT)
Age: 82
End: 2017-05-03

## 2017-05-03 RX ADMIN — Medication 1 TABLET(S): at 11:58

## 2017-05-03 RX ADMIN — HEPARIN SODIUM 5000 UNIT(S): 5000 INJECTION INTRAVENOUS; SUBCUTANEOUS at 05:39

## 2017-05-03 RX ADMIN — Medication 1 TABLET(S): at 05:39

## 2017-05-03 RX ADMIN — Medication 100 MICROGRAM(S): at 05:39

## 2017-05-03 RX ADMIN — Medication 1 TABLET(S): at 17:26

## 2017-05-03 RX ADMIN — TAMSULOSIN HYDROCHLORIDE 0.4 MILLIGRAM(S): 0.4 CAPSULE ORAL at 21:41

## 2017-05-03 RX ADMIN — HEPARIN SODIUM 5000 UNIT(S): 5000 INJECTION INTRAVENOUS; SUBCUTANEOUS at 17:26

## 2017-05-03 NOTE — DISCHARGE NOTE ADULT - SECONDARY DIAGNOSIS.
Urinary tract infection with hematuria, site unspecified Nephrolithiasis Hypernatremia Hydronephrosis with urinary obstruction due to ureteral calculus Colostomy in place Dementia

## 2017-05-03 NOTE — PROGRESS NOTE ADULT - SUBJECTIVE AND OBJECTIVE BOX
Interval History:    CENTRAL LINE:   [  ] YES       [  ] NO  MCKEE:                 [  ] YES       [  ] NO         REVIEW OF SYSTEMS:  All Systems below were reviewed and are negative [  ]  HEENT:  ID:  Pulmonary:  Cardiac:  GI:  Renal:  Musculoskeletal:  All other systems above were reviewed and are negative   [  ]      MEDICATIONS  (STANDING):  heparin  Injectable 5000Unit(s) SubCutaneous every 12 hours  sodium chloride 0.9%. 1000milliLiter(s) IV Continuous <Continuous>  levothyroxine 100MICROGram(s) Oral daily  lactobacillus acidophilus 1Tablet(s) Oral two times a day with meals  tamsulosin 0.4milliGRAM(s) Oral at bedtime  lactulose Syrup 20Gram(s) Oral every other day  amoxicillin  875 milliGRAM(s)/clavulanate 1Tablet(s) Oral two times a day    MEDICATIONS  (PRN):  acetaminophen   Tablet 650milliGRAM(s) Oral every 6 hours PRN For Temp greater than 38 C (100.4 F)  acetaminophen   Tablet. 650milliGRAM(s) Oral every 6 hours PRN Mild Pain (1 - 3)  morphine  - Injectable 2milliGRAM(s) IV Push every 4 hours PRN Moderate Pain (4 - 6)  ondansetron Injectable 4milliGRAM(s) IV Push every 6 hours PRN Nausea and/or Vomiting      Vital Signs Last 24 Hrs  T(C): --  T(F): --  HR: --  BP: --  BP(mean): --  RR: --  SpO2: --    I&O's Summary  I & Os for 24h ending 03 May 2017 07:00  =============================================  IN: 580 ml / OUT: 400 ml / NET: 180 ml    I & Os for current day (as of 03 May 2017 21:30)  =============================================  IN: 660 ml / OUT: 100 ml / NET: 560 ml      PHYSICAL EXAM:  HEENT: NC/AT; PERRLA  Neck: Soft; no tenderness  Lungs: CTA bilaterally; no wheezing.   Heart:  Abdomen:  Genital/ Rectal:  Extremities:  Neurologic:  Vascular:      LABORATORY:                        Assessment and Plan:          Pranay Bolden MD   [phone]. She is lethargic.   No fevers noted.     MEDICATIONS  (STANDING):  heparin  Injectable 5000Unit(s) SubCutaneous every 12 hours  sodium chloride 0.9%. 1000milliLiter(s) IV Continuous <Continuous>  levothyroxine 100MICROGram(s) Oral daily  lactobacillus acidophilus 1Tablet(s) Oral two times a day with meals  tamsulosin 0.4milliGRAM(s) Oral at bedtime  lactulose Syrup 20Gram(s) Oral every other day  amoxicillin  875 milliGRAM(s)/clavulanate 1Tablet(s) Oral two times a day    MEDICATIONS  (PRN):  acetaminophen   Tablet 650milliGRAM(s) Oral every 6 hours PRN For Temp greater than 38 C (100.4 F)  acetaminophen   Tablet. 650milliGRAM(s) Oral every 6 hours PRN Mild Pain (1 - 3)  morphine  - Injectable 2milliGRAM(s) IV Push every 4 hours PRN Moderate Pain (4 - 6)  ondansetron Injectable 4milliGRAM(s) IV Push every 6 hours PRN Nausea and/or Vomiting      Vital Signs Last 24 Hrs  T(C): --  T(F): --  HR: --  BP: --  BP(mean): --  RR: --  SpO2: --    I&O's Summary  I & Os for 24h ending 03 May 2017 07:00  =============================================  IN: 580 ml / OUT: 400 ml / NET: 180 ml    I & Os for current day (as of 03 May 2017 21:30)  =============================================  IN: 660 ml / OUT: 100 ml / NET: 560 ml      PHYSICAL EXAM:  HEENT: NC/AT; PERRLA  Neck: Soft; no tenderness  Lungs: Coarse BS  bilaterally; no wheezing.   Heart: RRR; no murmurs.  Abdomen: Soft; no masses.  Extremities: No ulcers or edema.  Neurologic: Awake. No deficits.       Assessment and Plan:    1. UTI   2. Marked left hydronephrosis.  3. Obstructed left renal stones.    . The patient is now on hospice  . Agree to change IV Zosyn to po Augmentin for 5 days.      Pranay Bolden MD   [phone].

## 2017-05-03 NOTE — PROGRESS NOTE ADULT - PROBLEM SELECTOR PLAN 2
Spoke to niece at bedside. She does not want any intervention. Spoke with ostomy nurse. She will arrange to get larger bag for ostomy so patient can be transferred to assisted living on hospice with supplies. Spoke to niece at bedside. She does not want any intervention. Spoke with ostomy nurse. She will arrange to get larger bag for ostomy so patient can be transferred to assisted living on hospice with supplies. Supplies will arrive tomorrow, 5/4/17. Other hospice related equipment, i.e. bed, to be delivered tomorrow also. D/C to assisted living in AM with home hospice.

## 2017-05-03 NOTE — DISCHARGE NOTE ADULT - CARE PLAN
Principal Discharge DX:	Sepsis, due to unspecified organism  Goal:	comfort measures  Instructions for follow-up, activity and diet:	continue current managmnet and medications  Secondary Diagnosis:	Urinary tract infection with hematuria, site unspecified  Instructions for follow-up, activity and diet:	continue current managmnet and medications  Secondary Diagnosis:	Nephrolithiasis  Instructions for follow-up, activity and diet:	comfort care only  Secondary Diagnosis:	Hypernatremia  Instructions for follow-up, activity and diet:	continue current managmnet and medications  Secondary Diagnosis:	Hydronephrosis with urinary obstruction due to ureteral calculus  Instructions for follow-up, activity and diet:	continue current managmnet and medications  Secondary Diagnosis:	Colostomy in place  Instructions for follow-up, activity and diet:	continue current managmnet and medications  Secondary Diagnosis:	Dementia  Instructions for follow-up, activity and diet:	continue current managmnet and medications

## 2017-05-03 NOTE — DISCHARGE NOTE ADULT - PATIENT PORTAL LINK FT
“You can access the [facility] Patient Portal, offered by [facility], by registering with the following website: [email or web address]”

## 2017-05-03 NOTE — PROGRESS NOTE ADULT - ASSESSMENT
Anticipate transfer to [facility] with home hospice in the morning A 89 yo F p/w was seen at wound are center today for followup with Dr Rock for tx of sacral/buttock wound , when she began to complain of diffuse mid abd pain and nausea. Pt with some dry heaves. No diarrhea. No cp/sob/palp. No neck / back pain. No dysuria / hematuria. No recent trauma. No agg/allev factors. Pt with no acute pain past few dayst. Pt with recent inj, had neg CT abd ~ 2 weeks ago. No agg/allev factors. No other inj or co CT abdomen was perfomed and demonstrated nephrolithiasis with mild to moderate hydronephrosis (9 mm  L kidney stone ) , eval requested by er and patient is scheduled for stent placement by urologist Dr Chang Card clearance requested due to hx of CAD/MI Found to be hypokalemic and potassium is replaced  Also found to have cellulitis of LLE Admit for septic workup and ID evaluation,send blood and urine cx,serial lactate levels,monitor vitals closley,ivfs hydration,monitor urine output and renal profile,iv abx as per id cons Dr Bolden Palliative care consult requested to discuss advance directives and complete [facility] Hospice evaluation requested due to poor prognosis .Case discussed with palliative care representative,comfort care and palliative measures seem to be appropriate level of care at this point Anticipate transfer to [facility] with home hospice in the morning Pt is a 89 yo female ,[facility] resident with hx od CAD,HTN,HLD,DVT ,Breast ca,colon ca, s/p colostomy ,OA who presents to the ED with a cc of fever T max of 101.  Pt confused and unable  provide further history at this time Diagnosed with renal colic,found to have moderate to severe hydronephrosis with obstructing kidney stone and  eval called by ER Scheduled for OR in am for stent placement as per Dr Chang, urologist arrangement Admit for septic workup and ID evaluation,send blood and urine cx,serial lactate levels,monitor vitals closley,ivfs hydration,monitor urine output and renal profile,iv abx as per id cons Dr Bolden Palliative care consult requested due to overall poor prognosis,to discuss advance directives and complete [facility] Hospice evaluation requested due to poor prognosis .Case discussed with palliative care representative,comfort care and palliative measures seem to be appropriate level of care at this point Anticpate discharge in am home with home hospice

## 2017-05-03 NOTE — PROGRESS NOTE ADULT - SUBJECTIVE AND OBJECTIVE BOX
PROGRESS NOTE    OVERNIGHT    SUBJECTIVE    PAST MEDICAL & SURGICAL HISTORY:  Hypothyroidism: newly diagnosed, started on T4 4/2013  Osteoarthritis  Colon cancer: &gt;30 years ago  Breast CA: &quot;many years ago&quot;  DVT (deep venous thrombosis): left leg not on A/C  HLD (hyperlipidemia)  Dementia  History of hysterectomy  Colostomy in place  Colon perforation  Ileostomy in place    HEALTH ISSUES - PROBLEM Dx:  Colostomy prolapse: Colostomy prolapse  Goals of care, counseling/discussion: Goals of care, counseling/discussion  Hydronephrosis with urinary obstruction due to ureteral calculus: Hydronephrosis with urinary obstruction due to ureteral calculus  Sepsis, due to unspecified organism: Sepsis, due to unspecified organism  Dementia without behavioral disturbance, unspecified dementia type: Dementia without behavioral disturbance, unspecified dementia type  HLD (hyperlipidemia): HLD (hyperlipidemia)  Colostomy in place: Colostomy in place  Dementia: Dementia  Osteoarthritis: Osteoarthritis  DVT (deep venous thrombosis): DVT (deep venous thrombosis)  Hypothyroidism: Hypothyroidism  Heart murmur: Heart murmur  Nephrolithiasis: Nephrolithiasis  Urinary tract infection with hematuria, site unspecified: Urinary tract infection with hematuria, site unspecified        FAMILY HISTORY:  No pertinent family history in first degree relatives    Allergies    No Known Allergies    Intolerances        MEDICATIONS  (STANDING):  heparin  Injectable 5000Unit(s) SubCutaneous every 12 hours  sodium chloride 0.9%. 1000milliLiter(s) IV Continuous <Continuous>  levothyroxine 100MICROGram(s) Oral daily  lactobacillus acidophilus 1Tablet(s) Oral two times a day with meals  tamsulosin 0.4milliGRAM(s) Oral at bedtime  lactulose Syrup 20Gram(s) Oral every other day  amoxicillin  875 milliGRAM(s)/clavulanate 1Tablet(s) Oral two times a day    MEDICATIONS  (PRN):  acetaminophen   Tablet 650milliGRAM(s) Oral every 6 hours PRN For Temp greater than 38 C (100.4 F)  acetaminophen   Tablet. 650milliGRAM(s) Oral every 6 hours PRN Mild Pain (1 - 3)  morphine  - Injectable 2milliGRAM(s) IV Push every 4 hours PRN Moderate Pain (4 - 6)  ondansetron Injectable 4milliGRAM(s) IV Push every 6 hours PRN Nausea and/or Vomiting      OBJECTIVE  PHYSICAL EXAM:  T(C): --  HR: --  BP: --  RR: --  SpO2: --  Wt(kg): --  I&O's Summary    I & Os for current day (as of 03 May 2017 10:29)  =============================================  IN: 580 ml / OUT: 400 ml / NET: 180 ml      Appearance: NAD.   HEENT:   Moist oral mucosa. Conjunctiva clear b/l.   Lymphatic: No cervical lymphadenopathy  Cardiovascular: RRR with no m/r/g.  Respiratory: Lungs CTAB.  Gastrointestinal:  Soft, nontender. No rebound. No rigidity. 	  Skin: No rashes, No ecchymoses, No cyanosis.	  Neurologic: Non-focal. Moving all extremities. Following commands.  Extremities: No edema. No erythema. No calf tenderness. Malvin's negative.  Vascular: DP 2+ b/l.  	  LABS:                RADIOLOGY & ADDITIONAL TESTS:    ASSESSMENT/PLAN: PROGRESS NOTE    OVERNIGHT  No new issues reported Transfer to [facility] with home hospice in am is anticipated as per sw  SUBJECTIVE  OOBTC ,confused ,poor mentation   PAST MEDICAL & SURGICAL HISTORY:  Hypothyroidism: newly diagnosed, started on T4 4/2013  Osteoarthritis  Colon cancer: &gt;30 years ago  Breast CA: &quot;many years ago&quot;  DVT (deep venous thrombosis): left leg not on A/C  HLD (hyperlipidemia)  Dementia  History of hysterectomy  Colostomy in place  Colon perforation  Ileostomy in place    HEALTH ISSUES - PROBLEM Dx:  Colostomy prolapse: Colostomy prolapse  Goals of care, counseling/discussion: Goals of care, counseling/discussion  Hydronephrosis with urinary obstruction due to ureteral calculus: Hydronephrosis with urinary obstruction due to ureteral calculus  Sepsis, due to unspecified organism: Sepsis, due to unspecified organism  Dementia without behavioral disturbance, unspecified dementia type: Dementia without behavioral disturbance, unspecified dementia type  HLD (hyperlipidemia): HLD (hyperlipidemia)  Colostomy in place: Colostomy in place  Dementia: Dementia  Osteoarthritis: Osteoarthritis  DVT (deep venous thrombosis): DVT (deep venous thrombosis)  Hypothyroidism: Hypothyroidism  Heart murmur: Heart murmur  Nephrolithiasis: Nephrolithiasis  Urinary tract infection with hematuria, site unspecified: Urinary tract infection with hematuria, site unspecified        FAMILY HISTORY:  No pertinent family history in first degree relatives    Allergies    No Known Allergies    Intolerances        MEDICATIONS  (STANDING):  heparin  Injectable 5000Unit(s) SubCutaneous every 12 hours  sodium chloride 0.9%. 1000milliLiter(s) IV Continuous <Continuous>  levothyroxine 100MICROGram(s) Oral daily  lactobacillus acidophilus 1Tablet(s) Oral two times a day with meals  tamsulosin 0.4milliGRAM(s) Oral at bedtime  lactulose Syrup 20Gram(s) Oral every other day  amoxicillin  875 milliGRAM(s)/clavulanate 1Tablet(s) Oral two times a day    MEDICATIONS  (PRN):  acetaminophen   Tablet 650milliGRAM(s) Oral every 6 hours PRN For Temp greater than 38 C (100.4 F)  acetaminophen   Tablet. 650milliGRAM(s) Oral every 6 hours PRN Mild Pain (1 - 3)  morphine  - Injectable 2milliGRAM(s) IV Push every 4 hours PRN Moderate Pain (4 - 6)  ondansetron Injectable 4milliGRAM(s) IV Push every 6 hours PRN Nausea and/or Vomiting      OBJECTIVE  PHYSICAL EXAM:  T(C): --  HR: --  BP: --  RR: --  SpO2: --  Wt(kg): --  I&O's Summary    I & Os for current day (as of 03 May 2017 10:29)  =============================================  IN: 580 ml / OUT: 400 ml / NET: 180 ml      Appearance: NAD.   HEENT:   Moist oral mucosa. Conjunctiva clear b/l.   Lymphatic: No cervical lymphadenopathy  Cardiovascular: RRR with no m/r/g.  Respiratory: Lungs CTAB.  Gastrointestinal:  Soft, nontender. No rebound. No rigidity. COLOSTAOMY PROLLAPSE	  Skin: No rashes, No ecchymoses, No cyanosis.	  Neurologic: Non-focal. Moving all extremities. Following commands.  Extremities: No edema. No erythema. No calf tenderness. Malvin's negative.  Vascular: DP 2+ b/l.  	  LABS:                RADIOLOGY & ADDITIONAL TESTS:    ASSESSMENT/PLAN:

## 2017-05-03 NOTE — PROGRESS NOTE ADULT - PROBLEM SELECTOR PLAN 1
Nijanell (HCP) has opted for conservative treatment with iv antibiotics for treatment of sepsis. She doesn't not want any urological interventions for hydronephrosis. In light of the GI findings today (loops of bowel in the colostomy), niece wants comfort measures only and no surgical intervention. Patient has been accepted to hospice in assisted living. Will switch patient to po abx. Patient appears comfortable and NAD. Spoke to nijanell at bedside today. Niece (HCP) has opted for conservative treatment with iv antibiotics for treatment of sepsis. She doesn't not want any urological interventions for hydronephrosis. In light of the GI findings today (loops of bowel in the colostomy), niece wants comfort measures only and no surgical intervention. Patient has been accepted to hospice in assisted living. Patient switched to po abx. Patient appears comfortable and NAD. Spoke to niece at bedside today.

## 2017-05-03 NOTE — DISCHARGE NOTE ADULT - HOSPITAL COURSE
Pt is a 89 yo female ,[facility] resident with hx od CAD,HTN,HLD,DVT ,Breast ca,colon ca, s/p colostomy ,OA,advanced end stage dementia  who presents to the ED with a cc of fever T max of 101.  Pt confused and unable  provide further history at this time Diagnosed with renal colic,found to have moderate to severe hydronephrosis with obstructing kidney stone and  eval called by ER Scheduled for OR in am for stent placement as per Dr Chang, urologist arrangement Family refused sx and reqyuested CMO Admit for septic workup and ID evaluation,send blood and urine cx,serial lactate levels,monitor vitals closley,ivfs hydration,monitor urine output and renal profile,iv abx as per id cons Dr Bolden Palliative care consult requested due to overall poor prognosis,to discuss advance directives and complete [facility] Hospice evaluation requested due to poor prognosis .Case discussed with palliative care representative,comfort care and palliative measures seem to be appropriate level of care at this point  Underwent repair of colostomy due to -plollapsed colostomy,prollapsed part  was resected ,end ileostomy was created with mucous fistula EBL 50 cc ,IVFS 500 cc Dark fecal material in colostomy was reported ,seen by Dr Ruiz this am , expanding area of herniation lateral to ostomy was noticed and reduced heparin was stopped   followed by pall care MD Recieves morphine prn pain Poor po intake is reported Hospice followup win the ,morning is pending ,may meet criteria for inpatient hospice   Case discussed with med staff,Dr Ruiz and Dr Cui Transfer to hospice inn when the bed is available Discussed with [facility] MD on a phone Prognosis is poor

## 2017-05-03 NOTE — DISCHARGE NOTE ADULT - CARE PROVIDER_API CALL
Andre Vogt), Internal Medicine  [address] [place] [id number]  Phone: [phone]  Fax: [phone]

## 2017-05-03 NOTE — PROGRESS NOTE ADULT - SUBJECTIVE AND OBJECTIVE BOX
:     HISTORY OF PRESENT ILLNESS: stable with treatment of UTI and obstructing calculus    PAST MEDICAL & SURGICAL HISTORY:  Hypothyroidism: newly diagnosed, started on T4 4/2013  Osteoarthritis  Colon cancer: &gt;30 years ago  Breast CA: &quot;many years ago&quot;  DVT (deep venous thrombosis): left leg not on A/C  HLD (hyperlipidemia)  Dementia  History of hysterectomy  Colostomy in place  Colon perforation  Ileostomy in place      MEDICATIONS  (STANDING):  heparin  Injectable 5000Unit(s) SubCutaneous every 12 hours  sodium chloride 0.9%. 1000milliLiter(s) IV Continuous <Continuous>  levothyroxine 100MICROGram(s) Oral daily  lactobacillus acidophilus 1Tablet(s) Oral two times a day with meals  tamsulosin 0.4milliGRAM(s) Oral at bedtime  lactulose Syrup 20Gram(s) Oral every other day  amoxicillin  875 milliGRAM(s)/clavulanate 1Tablet(s) Oral two times a day    MEDICATIONS  (PRN):  acetaminophen   Tablet 650milliGRAM(s) Oral every 6 hours PRN For Temp greater than 38 C (100.4 F)  acetaminophen   Tablet. 650milliGRAM(s) Oral every 6 hours PRN Mild Pain (1 - 3)  morphine  - Injectable 2milliGRAM(s) IV Push every 4 hours PRN Moderate Pain (4 - 6)  ondansetron Injectable 4milliGRAM(s) IV Push every 6 hours PRN Nausea and/or Vomiting      Allergies    No Known Allergies    Intolerances        SOCIAL HISTORY:    FAMILY HISTORY:  No pertinent family history in first degree relatives      Vital Signs Last 24 Hrs  T(C): --  T(F): --  HR: --  BP: --  BP(mean): --  RR: --  SpO2: --    PHYSICAL EXAM:    Constitutional: NAD, well-developed  R  Abd: BS+, soft, NT/ND, No CVAT  : Normal Vulva,/uretrha/introitus, meatus wnl., adenexa not palpable, Cervix moveable and non-tender, uterus non-tender meatus wnl.,   Extremities: No peripheral edema      LABS:              Urine Culture: CHIEF COMPLAINT:     HISTORY OF PRESENT ILLNESS:    PAST MEDICAL & SURGICAL HISTORY:  Hypothyroidism: newly diagnosed, started on T4 4/2013  Osteoarthritis  Colon cancer: &gt;30 years ago  Breast CA: &quot;many years ago&quot;  DVT (deep venous thrombosis): left leg not on A/C  HLD (hyperlipidemia)  Dementia  History of hysterectomy  Colostomy in place  Colon perforation  Ileostomy in place      MEDICATIONS  (STANDING):  heparin  Injectable 5000Unit(s) SubCutaneous every 12 hours  sodium chloride 0.9%. 1000milliLiter(s) IV Continuous <Continuous>  levothyroxine 100MICROGram(s) Oral daily  lactobacillus acidophilus 1Tablet(s) Oral two times a day with meals  tamsulosin 0.4milliGRAM(s) Oral at bedtime  lactulose Syrup 20Gram(s) Oral every other day  amoxicillin  875 milliGRAM(s)/clavulanate 1Tablet(s) Oral two times a day    MEDICATIONS  (PRN):  acetaminophen   Tablet 650milliGRAM(s) Oral every 6 hours PRN For Temp greater than 38 C (100.4 F)  acetaminophen   Tablet. 650milliGRAM(s) Oral every 6 hours PRN Mild Pain (1 - 3)  morphine  - Injectable 2milliGRAM(s) IV Push every 4 hours PRN Moderate Pain (4 - 6)  ondansetron Injectable 4milliGRAM(s) IV Push every 6 hours PRN Nausea and/or Vomiting      Allergies    No Known Allergies    Intolerances        SOCIAL HISTORY:    FAMILY HISTORY:  No pertinent family history in first degree relatives      Vital Signs Last 24 Hrs  T(C): --  T(F): --  HR: --  BP: --  BP(mean): --  RR: --  SpO2: --    PHYSICAL EXAM:    Constitutional: NAD, well-developed  R  Abd: BS+, soft, NT/ND, No CVAT  : Normal Vulva,/uretrha/introitus, meatus wnl., adenexa not palpable, Cervix moveable and non-tender, uterus non-tender meatus wnl.,   Extremities: No peripheral edema      LABS:              Urine Culture:       RADIOLOGY & ADDITIONAL STUDIES:          RADIOLOGY & ADDITIONAL STUDIES:

## 2017-05-03 NOTE — DISCHARGE NOTE ADULT - MEDICATION SUMMARY - MEDICATIONS TO TAKE
I will START or STAY ON the medications listed below when I get home from the hospital:    morphine  -- 2 milligram(s) intravenous every 3 to 4 hours, As Needed  -- Indication: For Comfort care    acetaminophen 650 mg rectal suppository  -- 1 suppository(ies) rectally every 6 hours, As Needed fever above 100.2  -- Indication: For Comfort care    Ativan  -- 0.5 milligram(s) intravenous every 6 hours, As Needed anxiety  -- Indication: For Comfort care    Dulcolax Laxative 10 mg rectal suppository  -- 1 suppository(ies) rectally once a day, As Needed  -- Indication: For Comfort care

## 2017-05-04 LAB
ANION GAP SERPL CALC-SCNC: 11 MMOL/L — SIGNIFICANT CHANGE UP (ref 5–17)
BUN SERPL-MCNC: 10 MG/DL — SIGNIFICANT CHANGE UP (ref 7–23)
CALCIUM SERPL-MCNC: 8.1 MG/DL — LOW (ref 8.5–10.1)
CHLORIDE SERPL-SCNC: 113 MMOL/L — HIGH (ref 96–108)
CO2 SERPL-SCNC: 20 MMOL/L — LOW (ref 22–31)
CREAT SERPL-MCNC: 0.88 MG/DL — SIGNIFICANT CHANGE UP (ref 0.5–1.3)
CULTURE RESULTS: SIGNIFICANT CHANGE UP
GLUCOSE SERPL-MCNC: 82 MG/DL — SIGNIFICANT CHANGE UP (ref 70–99)
HCT VFR BLD CALC: 34.1 % — LOW (ref 34.5–45)
HGB BLD-MCNC: 10.8 G/DL — LOW (ref 11.5–15.5)
MCHC RBC-ENTMCNC: 27.6 PG — SIGNIFICANT CHANGE UP (ref 27–34)
MCHC RBC-ENTMCNC: 31.6 GM/DL — LOW (ref 32–36)
MCV RBC AUTO: 87.4 FL — SIGNIFICANT CHANGE UP (ref 80–100)
PLATELET # BLD AUTO: 126 K/UL — LOW (ref 150–400)
POTASSIUM SERPL-MCNC: 3.1 MMOL/L — LOW (ref 3.5–5.3)
POTASSIUM SERPL-SCNC: 3.1 MMOL/L — LOW (ref 3.5–5.3)
RBC # BLD: 3.9 M/UL — SIGNIFICANT CHANGE UP (ref 3.8–5.2)
RBC # FLD: 14.3 % — SIGNIFICANT CHANGE UP (ref 10.3–14.5)
SODIUM SERPL-SCNC: 144 MMOL/L — SIGNIFICANT CHANGE UP (ref 135–145)
SPECIMEN SOURCE: SIGNIFICANT CHANGE UP
WBC # BLD: 8.7 K/UL — SIGNIFICANT CHANGE UP (ref 3.8–10.5)
WBC # FLD AUTO: 8.7 K/UL — SIGNIFICANT CHANGE UP (ref 3.8–10.5)

## 2017-05-04 RX ADMIN — Medication 1 TABLET(S): at 17:53

## 2017-05-04 RX ADMIN — Medication 100 MICROGRAM(S): at 05:39

## 2017-05-04 RX ADMIN — Medication 1 TABLET(S): at 05:39

## 2017-05-04 RX ADMIN — HEPARIN SODIUM 5000 UNIT(S): 5000 INJECTION INTRAVENOUS; SUBCUTANEOUS at 05:39

## 2017-05-04 RX ADMIN — LACTULOSE 20 GRAM(S): 10 SOLUTION ORAL at 11:23

## 2017-05-04 RX ADMIN — Medication 1 TABLET(S): at 11:23

## 2017-05-04 RX ADMIN — HEPARIN SODIUM 5000 UNIT(S): 5000 INJECTION INTRAVENOUS; SUBCUTANEOUS at 17:53

## 2017-05-04 RX ADMIN — SODIUM CHLORIDE 30 MILLILITER(S): 9 INJECTION INTRAMUSCULAR; INTRAVENOUS; SUBCUTANEOUS at 05:39

## 2017-05-04 RX ADMIN — TAMSULOSIN HYDROCHLORIDE 0.4 MILLIGRAM(S): 0.4 CAPSULE ORAL at 22:09

## 2017-05-04 NOTE — CONSULT NOTE ADULT - PROBLEM SELECTOR PROBLEM 1
Colostomy prolapse
Dementia without behavioral disturbance, unspecified dementia type
Goals of care, counseling/discussion
Urinary tract infection with hematuria, site unspecified

## 2017-05-04 NOTE — CONSULT NOTE ADULT - PROBLEM SELECTOR RECOMMENDATION 9
seondary to constipation  bowel regimen  ostomy nurse fu  this is chronic  and pt is not a surgical candidate  can get CRS clearance prior to discharge
I spoke to the patient's niece at great length. We went over the results of the tests so far and the different options for treatment. One option would be to put in ureteral stents with a lithotripsy in the future. This would require multiple trips to the doctor with multiple planned procedures. This would pose an undue burden on her aunt with already poor functional status and a poor understanding of the multiple procedures being performed. Another option would be to treat the infection with antibiotics and treat any pain with pain medications. The niece understands that the hydronephrosis is worse than a few months ago. It was explained to her that patient runs the increased risk of UTIs and life threatening sepsis without  intervention. She was also explained that in her aunt's case, the risks and undue burden and stress of   interventions outweigh the benefits. She is in agreement. The niece would like only to treat her aunt with iv abx and pain meds prn. There is a MOLST in place. If and when, the patient comes in with severe sepsis, she will consider comfort care.
R/O sepsis.  Pan-culture.  Antibiotics  Urology consult.

## 2017-05-04 NOTE — PROGRESS NOTE ADULT - SUBJECTIVE AND OBJECTIVE BOX
PROGRESS NOTE    OVERNIGHT    SUBJECTIVE    PAST MEDICAL & SURGICAL HISTORY:  Hypothyroidism: newly diagnosed, started on T4 4/2013  Osteoarthritis  Colon cancer: &gt;30 years ago  Breast CA: &quot;many years ago&quot;  DVT (deep venous thrombosis): left leg not on A/C  HLD (hyperlipidemia)  Dementia  History of hysterectomy  Colostomy in place  Colon perforation  Ileostomy in place    HEALTH ISSUES - PROBLEM Dx:  Colostomy prolapse: Colostomy prolapse  Goals of care, counseling/discussion: Goals of care, counseling/discussion  Hydronephrosis with urinary obstruction due to ureteral calculus: Hydronephrosis with urinary obstruction due to ureteral calculus  Sepsis, due to unspecified organism: Sepsis, due to unspecified organism  Dementia without behavioral disturbance, unspecified dementia type: Dementia without behavioral disturbance, unspecified dementia type  HLD (hyperlipidemia): HLD (hyperlipidemia)  Colostomy in place: Colostomy in place  Dementia: Dementia  Osteoarthritis: Osteoarthritis  DVT (deep venous thrombosis): DVT (deep venous thrombosis)  Hypothyroidism: Hypothyroidism  Heart murmur: Heart murmur  Nephrolithiasis: Nephrolithiasis  Urinary tract infection with hematuria, site unspecified: Urinary tract infection with hematuria, site unspecified        FAMILY HISTORY:  No pertinent family history in first degree relatives    Allergies    No Known Allergies    Intolerances        MEDICATIONS  (STANDING):  heparin  Injectable 5000Unit(s) SubCutaneous every 12 hours  sodium chloride 0.9%. 1000milliLiter(s) IV Continuous <Continuous>  levothyroxine 100MICROGram(s) Oral daily  lactobacillus acidophilus 1Tablet(s) Oral two times a day with meals  tamsulosin 0.4milliGRAM(s) Oral at bedtime  lactulose Syrup 20Gram(s) Oral every other day  amoxicillin  875 milliGRAM(s)/clavulanate 1Tablet(s) Oral two times a day    MEDICATIONS  (PRN):  acetaminophen   Tablet 650milliGRAM(s) Oral every 6 hours PRN For Temp greater than 38 C (100.4 F)  acetaminophen   Tablet. 650milliGRAM(s) Oral every 6 hours PRN Mild Pain (1 - 3)  morphine  - Injectable 2milliGRAM(s) IV Push every 4 hours PRN Moderate Pain (4 - 6)  ondansetron Injectable 4milliGRAM(s) IV Push every 6 hours PRN Nausea and/or Vomiting      OBJECTIVE  PHYSICAL EXAM:  T(C): 36.9, Max: 36.9 (05-04 @ 05:28)  HR: 73 (73 - 73)  BP: 166/74 (166/74 - 166/74)  RR: 17 (17 - 17)  SpO2: 95% (95% - 95%)  Wt(kg): --  I&O's Summary  I & Os for 24h ending 04 May 2017 07:00  =============================================  IN: 1200 ml / OUT: 200 ml / NET: 1000 ml    I & Os for current day (as of 04 May 2017 12:35)  =============================================  IN: 100 ml / OUT: 0 ml / NET: 100 ml      Appearance: NAD.   HEENT:   Moist oral mucosa. Conjunctiva clear b/l.   Lymphatic: No cervical lymphadenopathy  Cardiovascular: RRR with no m/r/g.  Respiratory: Lungs CTAB.  Gastrointestinal:  Soft, nontender. No rebound. No rigidity. 	  Skin: No rashes, No ecchymoses, No cyanosis.	  Neurologic: Non-focal. Moving all extremities. Following commands.  Extremities: No edema. No erythema. No calf tenderness. Malvin's negative.  Vascular: DP 2+ b/l.  	  LABS:                RADIOLOGY & ADDITIONAL TESTS:    ASSESSMENT/PLAN: PROGRESS NOTE    OVERNIGHT  Worsening prollapse of colostomy is reports ,colorectal sx eval is called   SUBJECTIVE  OOBTC NAD Denies complains   PAST MEDICAL & SURGICAL HISTORY:  Hypothyroidism: newly diagnosed, started on T4 4/2013  Osteoarthritis  Colon cancer: &gt;30 years ago  Breast CA: &quot;many years ago&quot;  DVT (deep venous thrombosis): left leg not on A/C  HLD (hyperlipidemia)  Dementia  History of hysterectomy  Colostomy in place  Colon perforation  Ileostomy in place    HEALTH ISSUES - PROBLEM Dx:  Colostomy prolapse: Colostomy prolapse worsened since yeasterday  Goals of care, counseling/discussion: Goals of care, counseling/discussion  Hydronephrosis with urinary obstruction due to ureteral calculus: Hydronephrosis with urinary obstruction due to ureteral calculus  Sepsis, due to unspecified organism: Sepsis, due to unspecified organism  Dementia without behavioral disturbance, unspecified dementia type: Dementia without behavioral disturbance, unspecified dementia type  HLD (hyperlipidemia): HLD (hyperlipidemia)  Colostomy in place: Colostomy in place  Dementia: Dementia  Osteoarthritis: Osteoarthritis  DVT (deep venous thrombosis): DVT (deep venous thrombosis)  Hypothyroidism: Hypothyroidism  Heart murmur: Heart murmur  Nephrolithiasis: Nephrolithiasis  Urinary tract infection with hematuria, site unspecified: Urinary tract infection with hematuria, site unspecified        FAMILY HISTORY:  No pertinent family history in first degree relatives    Allergies    No Known Allergies    Intolerances        MEDICATIONS  (STANDING):  heparin  Injectable 5000Unit(s) SubCutaneous every 12 hours  sodium chloride 0.9%. 1000milliLiter(s) IV Continuous <Continuous>  levothyroxine 100MICROGram(s) Oral daily  lactobacillus acidophilus 1Tablet(s) Oral two times a day with meals  tamsulosin 0.4milliGRAM(s) Oral at bedtime  lactulose Syrup 20Gram(s) Oral every other day  amoxicillin  875 milliGRAM(s)/clavulanate 1Tablet(s) Oral two times a day    MEDICATIONS  (PRN):  acetaminophen   Tablet 650milliGRAM(s) Oral every 6 hours PRN For Temp greater than 38 C (100.4 F)  acetaminophen   Tablet. 650milliGRAM(s) Oral every 6 hours PRN Mild Pain (1 - 3)  morphine  - Injectable 2milliGRAM(s) IV Push every 4 hours PRN Moderate Pain (4 - 6)  ondansetron Injectable 4milliGRAM(s) IV Push every 6 hours PRN Nausea and/or Vomiting      OBJECTIVE  PHYSICAL EXAM:  T(C): 36.9, Max: 36.9 (05-04 @ 05:28)  HR: 73 (73 - 73)  BP: 166/74 (166/74 - 166/74)  RR: 17 (17 - 17)  SpO2: 95% (95% - 95%)  Wt(kg): --  I&O's Summary  I & Os for 24h ending 04 May 2017 07:00  =============================================  IN: 1200 ml / OUT: 200 ml / NET: 1000 ml    I & Os for current day (as of 04 May 2017 12:35)  =============================================  IN: 100 ml / OUT: 0 ml / NET: 100 ml      Appearance: NAD.   HEENT:   Moist oral mucosa. Conjunctiva clear b/l.   Lymphatic: No cervical lymphadenopathy  Cardiovascular: RRR with no m/r/g.  Respiratory: Lungs CTAB.  Gastrointestinal:  Soft, nontender. No rebound. No rigidity. 	  Skin: No rashes, No ecchymoses, No cyanosis.	  Neurologic: Non-focal. Moving all extremities. Following commands.  Extremities: No edema. No erythema. No calf tenderness. Malvin's negative.  Vascular: DP 2+ b/l.  	  LABS:                RADIOLOGY & ADDITIONAL TESTS:    ASSESSMENT/PLAN:

## 2017-05-04 NOTE — PROGRESS NOTE ADULT - ATTENDING COMMENTS
Medically optimised for OR and cleared by card consult Dr Frost
Case d/w pall care cons Dr HARRY Cui and CMO initiated Hospice eval ordered Social service input reqiuested family would like to continue abx for now

## 2017-05-04 NOTE — PROGRESS NOTE ADULT - ASSESSMENT
The patient is a 89 year old female with a history of HL, DVT, breast cancer, colon cancer s/p colostomy, hypothyroidism, dementia who is admitted with urosepsis.    Plan:  - Cardiac meds discontinued  - Comfort measures. Hospice care.

## 2017-05-04 NOTE — PROGRESS NOTE ADULT - SUBJECTIVE AND OBJECTIVE BOX
Cardiology Follow-up Note    Addendum to today's progress note.    Plan now is for colostomy repair prior to discharge to hospice. The patient is optimized to proceed without additional cardiac testing. The patient has labile blood pressures. Will not restart labetalol at this time as she has been off of it for a few days now, risks of hypotension during ellen-operative period outweigh benefits.

## 2017-05-04 NOTE — PROGRESS NOTE ADULT - SUBJECTIVE AND OBJECTIVE BOX
Patient is a 89y old  Female who presents with a chief complaint of fever (27 Apr 2017 23:45)      Subjective: Patient seen and examined at bedside. No acute events overnight.     PAIN:0   	  DYSPNEA:n  NAUS/VOM: n  SECRETIONS: n  AGITATION: n    OTHER REVIEW OF SYSTEMS:  UNABLE TO OBTAIN  due to: confusion      T(C): --  HR: --  BP: --  RR: --  SpO2: --  Wt(kg): --  GENERAL:  year old female/male resting comfortably in NAD  HEENT:  NC/AT EOMI PERRL  NECK: supple no JVD  CVS:  +S1 S2 RRR  RESP: CTA B/L  GI:  soft prolapsed ostomy about a foot  : no suprapubic tenderness  MUSC:  no lower extremity edema  NEURO:  confused  PSYCH:  Alert, Calm, Cooperative   SKIN:  no rashes       MEDS REVIEWED	            No Known Allergies      LABS REVIEWED:            IMAGING REVIEWED    ADVANCED DIRECTIVES:        DNR     DNI          [facility]    PSYCHOSOCIAL-SPIRITUAL ASSESSMENT:    ___Reviewed     _Care  plan unchanged     _x__Care plan adjusted as below    GOALS OF CARE DISCUSSION  	_x__Palliative care info/counseling provided	    ___Family meeting  	__x_Advanced Directives addressed	    _x__See previous Palliative Medicine Note    AGENCY CHOICE DISCUSSED:   _x__HOSPICE   ___CALVARY  ___OTHER:

## 2017-05-04 NOTE — PROVIDER CONTACT NOTE (CHANGE IN STATUS NOTIFICATION) - ASSESSMENT
Patient is a 89yo bedbound female with stomal prolapse approximately  4" by 16" edematous: Irrigation sleeve [place] [id number] 70mm 2 3/4   applied sleeve folded over and resting lightly near surface of peristomal area   fistula formation appears to be non-functioning at skin surface 2:00 Stoma not retracting painful to patient when attempted peristoma red irritated

## 2017-05-04 NOTE — PROGRESS NOTE ADULT - ASSESSMENT
Pt is a 89 yo female ,[facility] resident with hx od CAD,HTN,HLD,DVT ,Breast ca,colon ca, s/p colostomy ,OA who presents to the ED with a cc of fever T max of 101.  Pt confused and unable  provide further history at this time Diagnosed with renal colic,found to have moderate to severe hydronephrosis with obstructing kidney stone and  eval called by ER Scheduled for OR in am for stent placement as per Dr Chang, urologist arrangement Admit for septic workup and ID evaluation,send blood and urine cx,serial lactate levels,monitor vitals closley,ivfs hydration,monitor urine output and renal profile,iv abx as per id cons Dr Bolden Palliative care consult requested due to overall poor prognosis,to discuss advance directives and complete [facility] H[facility]ice evaluation requested due to poor prognosis .Case discussed with palliative care representative,comfort care and palliative measures seem to be appropriate level of care at this point Anticpate discharge in am home with home hospice Pt is a 89 yo female ,[facility] resident with hx od CAD,HTN,HLD,DVT ,Breast ca,colon ca, s/p colostomy ,OA who presents to the ED with a cc of fever T max of 101.  Pt confused and unable  provide further history at this time Diagnosed with renal colic,found to have moderate to severe hydronephrosis with obstructing kidney stone and  eval called by ER Scheduled for OR in am for stent placement as per Dr Chang, urologist arrangement Admit for septic workup and ID evaluation,send blood and urine cx,serial lactate levels,monitor vitals closley,ivfs hydration,monitor urine output and renal profile,iv abx as per id cons Dr Bolden Palliative care consult requested due to overall poor prognosis,to discuss advance directives and [facility] requested due to poor prognosis .Case discussed with palliative care representative,comfort care and palliative measures seem to be appropriate level of care at this point Anticpate discharge in am home with home hospice Patient is scheduled for colostomy prollapse repair by sx in the morning Medically optimised for OR and cleared by card lino Frost Pt is a 89 yo female ,[facility] resident with hx od CAD,HTN,HLD,DVT ,Breast ca,colon ca, s/p colostomy ,OA who presents to the ED with a cc of fever T max of 101.  Pt confused and unable  provide further history at this time Diagnosed with renal colic,found to have moderate to severe hydronephrosis with obstructing kidney stone and  eval called by ER Scheduled for OR in am for stent placement as per Dr Chang, urologist arrangement Family refused sx and reqyuested CMO Admit for septic workup and ID evaluation,send blood and urine cx,serial lactate levels,monitor vitals closley,ivfs hydration,monitor urine output and renal profile,iv abx as per id cons Dr Bolden Palliative care consult requested due to overall poor prognosis,to discuss advance directives and complete [facility] Hospice evaluation requested due to poor prognosis .Case discussed with palliative care representative,comfort care and palliative measures seem to be appropriate level of care at this point Anticpate discharge in am home with home hospice Patient is scheduled for colostomy prollapse repair by sx in the morning Family now agrees for sx as a part of palliative measures due to progressively worsening colostomy prollapse Medically optimised for OR and cleared by porsha Frost

## 2017-05-04 NOTE — PROGRESS NOTE ADULT - PROBLEM SELECTOR PLAN 2
Nahum has agreed to minimal surgical intervention to correct prolapse since it has gotten much bigger and it might be an issue with hospice. Spoke with ostomy RN as well as colorectal surgeon. Patient will go to the OR in AM.

## 2017-05-04 NOTE — CONSULT NOTE ADULT - SUBJECTIVE AND OBJECTIVE BOX
Chief Complaint:  Patient is a 89y old  Female who presents with a chief complaint of fever (03 May 2017 10:29)    Hypothyroidism  Osteoarthritis  Colon cancer  Breast CA  DVT (deep venous thrombosis)  HLD (hyperlipidemia)  Dementia  History of hysterectomy  Colostomy in place  Colon perforation  Ileostomy in place     HPI:  Pt is a 89 yo female ,[facility] resident with hx od CAD,HTN,HLD,DVT ,Breast ca,colon ca, s/p colostomy ,OA who presents to the ED with a cc of fever T max of 101.  Pt confused and unable  provide further history at this time Diagnosed with renal colic,found to have moderate to severe hydronephrosis with obstructing kidney stone and  eval called by ER Scheduled for OR in am for stent placement as per Dr Chang, urologist arrangement Admit for septic workup and ID evaluation,send blood and urine cx,serial lactate levels,monitor vitals closley,ivfs hydration,monitor urine output and renal profile,iv abx as per id cons Dr Bolden Palliative care consult requested due to overall poor prognosis,to discuss advance directives and complete [facility] (27 Apr 2017 20:30)      No Known Allergies      heparin  Injectable 5000Unit(s) SubCutaneous every 12 hours  acetaminophen   Tablet 650milliGRAM(s) Oral every 6 hours PRN  acetaminophen   Tablet. 650milliGRAM(s) Oral every 6 hours PRN  morphine  - Injectable 2milliGRAM(s) IV Push every 4 hours PRN  ondansetron Injectable 4milliGRAM(s) IV Push every 6 hours PRN  sodium chloride 0.9%. 1000milliLiter(s) IV Continuous <Continuous>  levothyroxine 100MICROGram(s) Oral daily  lactobacillus acidophilus 1Tablet(s) Oral two times a day with meals  tamsulosin 0.4milliGRAM(s) Oral at bedtime  lactulose Syrup 20Gram(s) Oral every other day  amoxicillin  875 milliGRAM(s)/clavulanate 1Tablet(s) Oral two times a day        FAMILY HISTORY:  No pertinent family history in first degree relatives        Review of Systems:    General:  No wt loss, fevers, chills, night sweats,fatigue,   Eyes:  Good vision, no reported pain  ENT:  No sore throat, pain, runny nose, dysphagia  CV:  No pain, palpitatioins, hypo/hypertension  Resp:  No dyspnea, cough, tachypnea, wheezing  :  No pain, bleeding, incontinence, nocturia  Muscle:  No pain, weakness  Neuro:  No weakness, tingling, memory problems  Psych:  No fatigue, insomnia, mood problems, depression  Endocrine:  No polyuria, polydypsia, cold/heat intolerance  Heme:  No petechiae, ecchymosis, easy bruisability  Skin:  No rash, tattoos, scars, edema    Relevant Family History:       Relevant Social History:       Physical Exam:    Vital Signs:  Vital Signs Last 24 Hrs  T(C): 36.9, Max: 36.9 (05-04 @ 05:28)  T(F): 98.5, Max: 98.5 (05-04 @ 05:28)  HR: 73 (73 - 73)  BP: 166/74 (166/74 - 166/74)  BP(mean): --  RR: 17 (17 - 17)  SpO2: 95% (95% - 95%)  Daily     Daily     General:  Appears stated age, well-groomed, well-nourished, no distress  HEENT:  NC/AT,  conjunctivae clear and pink, no thyromegaly, nodules, adenopathy, no JVD  Chest:  Full & symmetric excursion, no increased effort, breath sounds clear  Cardiovascular:  Regular rhythm, S1, S2, no murmur/rub/S3/S4, no abdominal bruit, no edema  Abdomen:  Soft, non-tender, non-distended, normoactive bowel sounds,  no masses ,no hepatosplenomeagaly, no signs of chronic liver disease  Extremities:  no cyanosis,clubbing or edema  Skin:  No rash/erythema/ecchymoses/petechiae/wounds/abscess/warm/dry  Neuro/Psych:  Alert, oriented, no asterixis, no tremor, no encephalopathy    Laboratory:                      Imaging:

## 2017-05-04 NOTE — CONSULT NOTE ADULT - CONSULT REASON
Goals of care
Obstructive Uropathy
Pre-op Cardiac evaluation.
Prolapsing stomal tissue
possible pneumonia
protruding ostomy
Evaluate for UTI.

## 2017-05-04 NOTE — CONSULT NOTE ADULT - SUBJECTIVE AND OBJECTIVE BOX
89F with dementia, wheelchair bound, admitted from assisted living facility 4/27/17 for UTI from obstructing left renal stone. During her hospitalization in which her UTI was managed expectantly with abx, it was noted that she had stomal prolapse from her ostomy located in the RLQ. While devising a discharge plan to be sent to home hospice, it was determined that she would not be able to be discharged until this prolapsing stomal tissue was addressed. Per RN, she is not tolerating much po secondary to her cognitive state, and stomal output has not been consistently measured.       PMH/PSH:   Hypothyroidism   Osteoarthritis  Colon cancer 30 years ago  Breast C   DVT (deep venous thrombosis): left leg not on A/C  HLD (hyperlipidemia)  Dementia  History of hysterectomy  Ileostomy/colostomy     NKDA    Inpt meds - Augmentin, Flomax, Synthroid    AFVSS    Confused, but pleasant, NAD  RLQ peristomal skin red and chronically indurated, 12 inches of prolapsing mucosa, appears to be small intestinal in appearance; soft, NT, ND    CT A/P 4/27/17 report and images reviewed    A/P - 89F with chronic prolapsing stoma hindering placement    Discussed above with patient's POA Anahi Anand who consents to local excision of prolapsing tissue without repair of associated parastomal hernia.   NPO after MN except meds  T&S ordered.  Cards clearance noted from 1 week prior for surgery.   Scheduled for 0930 tomorrow.  D/W Dr. Willson and Dr. Cui.  Thank you!

## 2017-05-04 NOTE — PROGRESS NOTE ADULT - PROBLEM SELECTOR PLAN 1
Niece has changed her mind regarding the patients prolapsed colostomy. She doesn't not want any urological interventions for hydronephrosis. Niece would still like patient to enroll in hospice after the surgical correction.

## 2017-05-05 ENCOUNTER — TRANSCRIPTION ENCOUNTER (OUTPATIENT)
Age: 82
End: 2017-05-05

## 2017-05-05 ENCOUNTER — RESULT REVIEW (OUTPATIENT)
Age: 82
End: 2017-05-05

## 2017-05-05 PROCEDURE — 88307 TISSUE EXAM BY PATHOLOGIST: CPT | Mod: 26

## 2017-05-05 RX ORDER — ACETAMINOPHEN 500 MG
650 TABLET ORAL EVERY 6 HOURS
Qty: 0 | Refills: 0 | Status: DISCONTINUED | OUTPATIENT
Start: 2017-05-06 | End: 2017-05-08

## 2017-05-05 RX ORDER — POTASSIUM CHLORIDE 20 MEQ
20 PACKET (EA) ORAL
Qty: 0 | Refills: 0 | Status: COMPLETED | OUTPATIENT
Start: 2017-05-05 | End: 2017-05-05

## 2017-05-05 RX ORDER — LACTULOSE 10 G/15ML
20 SOLUTION ORAL EVERY OTHER DAY
Qty: 0 | Refills: 0 | Status: DISCONTINUED | OUTPATIENT
Start: 2017-05-05 | End: 2017-05-06

## 2017-05-05 RX ORDER — HYDROMORPHONE HYDROCHLORIDE 2 MG/ML
0.5 INJECTION INTRAMUSCULAR; INTRAVENOUS; SUBCUTANEOUS
Qty: 0 | Refills: 0 | Status: DISCONTINUED | OUTPATIENT
Start: 2017-05-05 | End: 2017-05-05

## 2017-05-05 RX ORDER — LEVOTHYROXINE SODIUM 125 MCG
100 TABLET ORAL DAILY
Qty: 0 | Refills: 0 | Status: DISCONTINUED | OUTPATIENT
Start: 2017-05-05 | End: 2017-05-07

## 2017-05-05 RX ORDER — ONDANSETRON 8 MG/1
4 TABLET, FILM COATED ORAL EVERY 6 HOURS
Qty: 0 | Refills: 0 | Status: DISCONTINUED | OUTPATIENT
Start: 2017-05-05 | End: 2017-05-05

## 2017-05-05 RX ORDER — SODIUM CHLORIDE 9 MG/ML
1000 INJECTION, SOLUTION INTRAVENOUS
Qty: 0 | Refills: 0 | Status: DISCONTINUED | OUTPATIENT
Start: 2017-05-05 | End: 2017-05-05

## 2017-05-05 RX ORDER — CEFAZOLIN SODIUM 1 G
2000 VIAL (EA) INJECTION ONCE
Qty: 0 | Refills: 0 | Status: DISCONTINUED | OUTPATIENT
Start: 2017-05-05 | End: 2017-05-06

## 2017-05-05 RX ORDER — SODIUM CHLORIDE 9 MG/ML
1000 INJECTION INTRAMUSCULAR; INTRAVENOUS; SUBCUTANEOUS
Qty: 0 | Refills: 0 | Status: DISCONTINUED | OUTPATIENT
Start: 2017-05-05 | End: 2017-05-05

## 2017-05-05 RX ORDER — HEPARIN SODIUM 5000 [USP'U]/ML
5000 INJECTION INTRAVENOUS; SUBCUTANEOUS EVERY 12 HOURS
Qty: 0 | Refills: 0 | Status: DISCONTINUED | OUTPATIENT
Start: 2017-05-05 | End: 2017-05-07

## 2017-05-05 RX ORDER — SODIUM CHLORIDE 9 MG/ML
1000 INJECTION INTRAMUSCULAR; INTRAVENOUS; SUBCUTANEOUS
Qty: 0 | Refills: 0 | Status: DISCONTINUED | OUTPATIENT
Start: 2017-05-05 | End: 2017-05-07

## 2017-05-05 RX ORDER — ONDANSETRON 8 MG/1
4 TABLET, FILM COATED ORAL EVERY 6 HOURS
Qty: 0 | Refills: 0 | Status: DISCONTINUED | OUTPATIENT
Start: 2017-05-05 | End: 2017-05-08

## 2017-05-05 RX ORDER — TAMSULOSIN HYDROCHLORIDE 0.4 MG/1
0.4 CAPSULE ORAL AT BEDTIME
Qty: 0 | Refills: 0 | Status: DISCONTINUED | OUTPATIENT
Start: 2017-05-05 | End: 2017-05-07

## 2017-05-05 RX ORDER — LACTOBACILLUS ACIDOPHILUS 100MM CELL
1 CAPSULE ORAL
Qty: 0 | Refills: 0 | Status: DISCONTINUED | OUTPATIENT
Start: 2017-05-05 | End: 2017-05-07

## 2017-05-05 RX ADMIN — Medication 1 TABLET(S): at 17:33

## 2017-05-05 RX ADMIN — TAMSULOSIN HYDROCHLORIDE 0.4 MILLIGRAM(S): 0.4 CAPSULE ORAL at 21:23

## 2017-05-05 RX ADMIN — HEPARIN SODIUM 5000 UNIT(S): 5000 INJECTION INTRAVENOUS; SUBCUTANEOUS at 17:33

## 2017-05-05 RX ADMIN — SODIUM CHLORIDE 30 MILLILITER(S): 9 INJECTION INTRAMUSCULAR; INTRAVENOUS; SUBCUTANEOUS at 14:04

## 2017-05-05 RX ADMIN — Medication 20 MILLIEQUIVALENT(S): at 04:14

## 2017-05-05 RX ADMIN — SODIUM CHLORIDE 100 MILLILITER(S): 9 INJECTION, SOLUTION INTRAVENOUS at 11:54

## 2017-05-05 RX ADMIN — Medication 20 MILLIEQUIVALENT(S): at 05:08

## 2017-05-05 RX ADMIN — LACTULOSE 20 GRAM(S): 10 SOLUTION ORAL at 21:23

## 2017-05-05 NOTE — PROGRESS NOTE ADULT - ASSESSMENT
Pt is a 89 yo female ,[facility] resident with hx od CAD,HTN,HLD,DVT ,Breast ca,colon ca, s/p colostomy ,OA who presents to the ED with a cc of fever T max of 101.  Pt confused and unable  provide further history at this time Diagnosed with renal colic,found to have moderate to severe hydronephrosis with obstructing kidney stone and  eval called by ER Scheduled for OR in am for stent placement as per Dr Chang, urologist arrangement Family refused sx and reqyuested CMO Admit for septic workup and ID evaluation,send blood and urine cx,serial lactate levels,monitor vitals closley,ivfs hydration,monitor urine output and renal profile,iv abx as per id cons Dr Bolden Palliative care consult requested due to overall poor prognosis,to discuss advance directives and complete [facility] Hospice evaluation requested due to poor prognosis .Case discussed with palliative care representative,comfort care and palliative measures seem to be appropriate level of care at this point Anticpate discharge in am home with home hospice Patient is scheduled for colostomy prollapse repair by sx in the morning Family now agrees for sx as a part of palliative measures due to progressively worsening colostomy prollapse Medically optimised for OR and cleared by porsha Frost Pt is a 89 yo female ,[facility] resident with hx od CAD,HTN,HLD,DVT ,Breast ca,colon ca, s/p colostomy ,OA who presents to the ED with a cc of fever T max of 101.  Pt confused and unable  provide further history at this time Diagnosed with renal colic,found to have moderate to severe hydronephrosis with obstructing kidney stone and  eval called by ER Scheduled for OR in am for stent placement as per Dr Chang, urologist arrangement Family refused sx and reqyuested CMO Admit for septic workup and ID evaluation,send blood and urine cx,serial lactate levels,monitor vitals closley,ivfs hydration,monitor urine output and renal profile,iv abx as per id cons Dr Bolden Palliative care consult requested due to overall poor prognosis,to discuss advance directives and complete [facility] Hospice evaluation requested due to poor prognosis .Case discussed with palliative care representative,comfort care and palliative measures seem to be appropriate level of care at this point  Underwent repair of colostomy today-plollapsed colostomy was resected ,end ileostomy was created with mucous fistula EBL 50 cc ,IVFS 500 cc Case was disussed with sx cons Anticipate discharge on Monday back to [facility] with home hospice

## 2017-05-05 NOTE — PROGRESS NOTE ADULT - SUBJECTIVE AND OBJECTIVE BOX
PROGRESS NOTE    OVERNIGHT  Worsening prollapse of colostomy is reports ,colorectal sx eval is called   SUBJECTIVE  OOBTC NAD Denies complains   PAST MEDICAL & SURGICAL HISTORY:  Hypothyroidism: newly diagnosed, started on T4 4/2013  Osteoarthritis  Colon cancer: &gt;30 years ago  Breast CA: &quot;many years ago&quot;  DVT (deep venous thrombosis): left leg not on A/C  HLD (hyperlipidemia)  Dementia  History of hysterectomy  Colostomy in place  Colon perforation  Ileostomy in place    HEALTH ISSUES - PROBLEM Dx:  Colostomy prolapse: Colostomy prolapse worsened since yeasterday  Goals of care, counseling/discussion: Goals of care, counseling/discussion  Hydronephrosis with urinary obstruction due to ureteral calculus: Hydronephrosis with urinary obstruction due to ureteral calculus  Sepsis, due to unspecified organism: Sepsis, due to unspecified organism  Dementia without behavioral disturbance, unspecified dementia type: Dementia without behavioral disturbance, unspecified dementia type  HLD (hyperlipidemia): HLD (hyperlipidemia)  Colostomy in place: Colostomy in place  Dementia: Dementia  Osteoarthritis: Osteoarthritis  DVT (deep venous thrombosis): DVT (deep venous thrombosis)  Hypothyroidism: Hypothyroidism  Heart murmur: Heart murmur  Nephrolithiasis: Nephrolithiasis  Urinary tract infection with hematuria, site unspecified: Urinary tract infection with hematuria, site unspecified        FAMILY HISTORY:  No pertinent family history in first degree relatives    Allergies    No Known Allergies    Intolerances        MEDICATIONS  (STANDING):  heparin  Injectable 5000Unit(s) SubCutaneous every 12 hours  sodium chloride 0.9%. 1000milliLiter(s) IV Continuous <Continuous>  levothyroxine 100MICROGram(s) Oral daily  lactobacillus acidophilus 1Tablet(s) Oral two times a day with meals  tamsulosin 0.4milliGRAM(s) Oral at bedtime  lactulose Syrup 20Gram(s) Oral every other day  amoxicillin  875 milliGRAM(s)/clavulanate 1Tablet(s) Oral two times a day    MEDICATIONS  (PRN):  acetaminophen   Tablet 650milliGRAM(s) Oral every 6 hours PRN For Temp greater than 38 C (100.4 F)  acetaminophen   Tablet. 650milliGRAM(s) Oral every 6 hours PRN Mild Pain (1 - 3)  morphine  - Injectable 2milliGRAM(s) IV Push every 4 hours PRN Moderate Pain (4 - 6)  ondansetron Injectable 4milliGRAM(s) IV Push every 6 hours PRN Nausea and/or Vomiting      OBJECTIVE  PHYSICAL EXAM:  T(C): 36.9, Max: 36.9 (05-04 @ 05:28)  HR: 73 (73 - 73)  BP: 166/74 (166/74 - 166/74)  RR: 17 (17 - 17)  SpO2: 95% (95% - 95%)  Wt(kg): --  I&O's Summary  I & Os for 24h ending 04 May 2017 07:00  =============================================  IN: 1200 ml / OUT: 200 ml / NET: 1000 ml    I & Os for current day (as of 04 May 2017 12:35)  =============================================  IN: 100 ml / OUT: 0 ml / NET: 100 ml      Appearance: NAD.   HEENT:   Moist oral mucosa. Conjunctiva clear b/l.   Lymphatic: No cervical lymphadenopathy  Cardiovascular: RRR with no m/r/g.  Respiratory: Lungs CTAB.  Gastrointestinal:  Soft, nontender. No rebound. No rigidity. 	  Skin: No rashes, No ecchymoses, No cyanosis.	  Neurologic: Non-focal. Moving all extremities. Following commands.  Extremities: No edema. No erythema. No calf tenderness. Malvin's negative.  Vascular: DP 2+ b/l.  	  LABS:                RADIOLOGY & ADDITIONAL TESTS:    ASSESSMENT/PLAN: PROGRESS NOTE    OVERNIGHT  Underwent repair of colostomy today-plollapsed colostomy was resected ,end ileostomy was created with mucous fistula EBL 50 cc ,IVFS 500 cc Case was disussed with sx cons   SUBJECTIVE  OOBTC NAD Denies complains   PAST MEDICAL & SURGICAL HISTORY:  Hypothyroidism: newly diagnosed, started on T4 4/2013  Osteoarthritis  Colon cancer: &gt;30 years ago  Breast CA: &quot;many years ago&quot;  DVT (deep venous thrombosis): left leg not on A/C  HLD (hyperlipidemia)  Dementia  History of hysterectomy  Colostomy in place  Colon perforation  Ileostomy in place    HEALTH ISSUES - PROBLEM Dx:  Colostomy prolapse: Colostomy prolapse repaired today  Goals of care, counseling/discussion: Goals of care, counseling/discussion  Hydronephrosis with urinary obstruction due to ureteral calculus: Hydronephrosis with urinary obstruction due to ureteral calculus  Sepsis, due to unspecified organism: Sepsis, due to unspecified organism  Dementia without behavioral disturbance, unspecified dementia type: Dementia without behavioral disturbance, unspecified dementia type  HLD (hyperlipidemia): HLD (hyperlipidemia)  Colostomy in place: Colostomy in place  Dementia: Dementia  Osteoarthritis: Osteoarthritis  DVT (deep venous thrombosis): DVT (deep venous thrombosis)  Hypothyroidism: Hypothyroidism  Heart murmur: Heart murmur  Nephrolithiasis: Nephrolithiasis  Urinary tract infection with hematuria, site unspecified: Urinary tract infection with hematuria, site unspecified        FAMILY HISTORY:  No pertinent family history in first degree relatives    Allergies    No Known Allergies    Intolerances        MEDICATIONS  (STANDING):  heparin  Injectable 5000Unit(s) SubCutaneous every 12 hours  sodium chloride 0.9%. 1000milliLiter(s) IV Continuous <Continuous>  levothyroxine 100MICROGram(s) Oral daily  lactobacillus acidophilus 1Tablet(s) Oral two times a day with meals  tamsulosin 0.4milliGRAM(s) Oral at bedtime  lactulose Syrup 20Gram(s) Oral every other day  amoxicillin  875 milliGRAM(s)/clavulanate 1Tablet(s) Oral two times a day    MEDICATIONS  (PRN):  acetaminophen   Tablet 650milliGRAM(s) Oral every 6 hours PRN For Temp greater than 38 C (100.4 F)  acetaminophen   Tablet. 650milliGRAM(s) Oral every 6 hours PRN Mild Pain (1 - 3)  morphine  - Injectable 2milliGRAM(s) IV Push every 4 hours PRN Moderate Pain (4 - 6)  ondansetron Injectable 4milliGRAM(s) IV Push every 6 hours PRN Nausea and/or Vomiting      OBJECTIVE  PHYSICAL EXAM      Appearance: NAD.   HEENT:   Moist oral mucosa. Conjunctiva clear b/l.   Lymphatic: No cervical lymphadenopathy  Cardiovascular: RRR with no m/r/g.  Respiratory: Lungs CTAB.  Gastrointestinal:  Soft, nontender. No rebound. No rigidity. Colostomy is replaced and repaired 	  Skin: No rashes, No ecchymoses, No cyanosis.	  Neurologic: Non-focal. Moving all extremities. Following commands.  Extremities: No edema. No erythema. No calf tenderness. Malvin's negative.  Vascular: DP 2+ b/l.  	  LABS:    (C): 36.6, Max: 36.8 (05-05 @ 05:01)  HR: 92 (81 - 99)  BP: 167/71 (134/70 - 167/71)  RR: 20 (17 - 22)  SpO2: 98% (93% - 98%)  Wt(kg): --        LABS REVIEWED:                        10.8   8.7   )-----------( 126      ( 04 May 2017 16:03 )             34.1     05-04    144  |  113<H>  |  10  ----------------------------<  82  3.1<L>   |  20<L>  |  0.88    Ca    8.1<L>      04 May 2017             RADIOLOGY & ADDITIONAL TESTS:    ASSESSMENT/PLAN:

## 2017-05-05 NOTE — PROGRESS NOTE ADULT - SUBJECTIVE AND OBJECTIVE BOX
Patient is a 89y old  Female who presents with a chief complaint of fever (03 May 2017 10:29)      Subjective: Patient seen and examined at bedside. No acute events overnight.     PAIN: n  DYSPNEA: 	n  NAUS/VOM: n	  SECRETIONS: 	n  AGITATION: n      UNABLE TO OBTAIN  due to: sedation from anesthesia    T(C): 36.6, Max: 36.8 (05-05 @ 05:01)  HR: 92 (81 - 99)  BP: 167/71 (134/70 - 167/71)  RR: 20 (17 - 22)  SpO2: 98% (93% - 98%)  Wt(kg): --  GENERAL:  resting comfortably in NAD  HEENT:  NC/AT  NECK: supple no JVD  CVS:  +S1 S2 RRR  RESP: CTA B/L  GI:  soft NT/ND +BS, loop of bowel reduced  : no suprapubic tenderness  MUSC:  no lower extremity edema  NEURO:  sedated  PSYCH:  Calm   SKIN:  Warm, moist, no rashes       MEDS REVIEWED	          OPIATE NAÏVE (Y/N) y    No Known Allergies      LABS REVIEWED:                        10.8   8.7   )-----------( 126      ( 04 May 2017 16:03 )             34.1     05-04    144  |  113<H>  |  10  ----------------------------<  82  3.1<L>   |  20<L>  |  0.88    Ca    8.1<L>      04 May 2017 16:03        IMAGING REVIEWED    ADVANCED DIRECTIVES:      DNR     DNI         [facility]    PSYCHOSOCIAL-SPIRITUAL ASSESSMENT:    ___Reviewed     _x__Care  plan unchanged     ___Care plan adjusted as below    GOALS OF CARE DISCUSSION  	___Palliative care info/counseling provided	    ___Family meeting  	___Advanced Directives addressed	    _x__See previous Palliative Medicine Note    AGENCY CHOICE DISCUSSED:   __x_HOSPICE   ___CALVARY  ___OTHER:

## 2017-05-05 NOTE — PROGRESS NOTE ADULT - ASSESSMENT
The patient is a 89 year old female with a history of HL, DVT, breast cancer, colon cancer s/p colostomy, hypothyroidism, dementia who is admitted with urosepsis.    Plan:  - Patient has been off of labetalol for a number of days. Will not restart at this time as to avoid ellen-operative hypotension.  - Plan now is for colostomy repair prior to discharge to hospice. The patient is optimized to proceed without additional cardiac testing.

## 2017-05-05 NOTE — BRIEF OPERATIVE NOTE - PROCEDURE
Ileostomy, Nathalia  05/05/2017    Active  [facility]1  Ileocolic resection  05/05/2017    Active  [facility]  Colostomy takedown  05/05/2017    Active  [facility]1

## 2017-05-05 NOTE — PROGRESS NOTE ADULT - SUBJECTIVE AND OBJECTIVE BOX
Interval History:    CENTRAL LINE:   [  ] YES       [  ] NO  MCKEE:                 [  ] YES       [  ] NO         REVIEW OF SYSTEMS:  All Systems below were reviewed and are negative [  ]  HEENT:  ID:  Pulmonary:  Cardiac:  GI:  Renal:  Musculoskeletal:  All other systems above were reviewed and are negative   [  ]      MEDICATIONS  (STANDING):  ceFAZolin   IVPB 2000milliGRAM(s) IV Intermittent once  heparin  Injectable 5000Unit(s) SubCutaneous every 12 hours  sodium chloride 0.9%. 1000milliLiter(s) IV Continuous <Continuous>  levothyroxine 100MICROGram(s) Oral daily  lactobacillus acidophilus 1Tablet(s) Oral two times a day with meals  tamsulosin 0.4milliGRAM(s) Oral at bedtime  lactulose Syrup 20Gram(s) Oral every other day  amoxicillin  875 milliGRAM(s)/clavulanate 1Tablet(s) Oral two times a day    MEDICATIONS  (PRN):  ondansetron Injectable 4milliGRAM(s) IV Push every 6 hours PRN Nausea and/or Vomiting      Vital Signs Last 24 Hrs  T(C): 36.6, Max: 36.8 (05-05 @ 05:01)  T(F): 97.9, Max: 98.2 (05-05 @ 05:01)  HR: 92 (81 - 99)  BP: 167/71 (134/70 - 167/71)  BP(mean): --  RR: 20 (17 - 22)  SpO2: 98% (93% - 98%)    I&O's Summary  I & Os for 24h ending 05 May 2017 07:00  =============================================  IN: 920 ml / OUT: 0 ml / NET: 920 ml    I & Os for current day (as of 05 May 2017 21:01)  =============================================  IN: 280 ml / OUT: 60 ml / NET: 220 ml      PHYSICAL EXAM:  HEENT: NC/AT; PERRLA  Neck: Soft; no tenderness  Lungs: CTA bilaterally; no wheezing.   Heart:  Abdomen:  Genital/ Rectal:  Extremities:  Neurologic:  Vascular:      LABORATORY:    CBC Full  -  ( 04 May 2017 16:03 )  WBC Count : 8.7 K/uL  Hemoglobin : 10.8 g/dL  Hematocrit : 34.1 %  Platelet Count - Automated : 126 K/uL  Mean Cell Volume : 87.4 fl  Mean Cell Hemoglobin : 27.6 pg  Mean Cell Hemoglobin Concentration : 31.6 gm/dL  Auto Neutrophil # : x  Auto Lymphocyte # : x  Auto Monocyte # : x  Auto Eosinophil # : x  Auto Basophil # : x  Auto Neutrophil % : x  Auto Lymphocyte % : x  Auto Monocyte % : x  Auto Eosinophil % : x  Auto Basophil % : x          05-04    144  |  113<H>  |  10  ----------------------------<  82  3.1<L>   |  20<L>  |  0.88    Ca    8.1<L>      04 May 2017 16:03            Assessment and Plan:          Pranay Bolden MD   [phone]. No fevers.  Comforttable.     MEDICATIONS  (STANDING):  ceFAZolin   IVPB 2000milliGRAM(s) IV Intermittent once  heparin  Injectable 5000Unit(s) SubCutaneous every 12 hours  sodium chloride 0.9%. 1000milliLiter(s) IV Continuous <Continuous>  levothyroxine 100MICROGram(s) Oral daily  lactobacillus acidophilus 1Tablet(s) Oral two times a day with meals  tamsulosin 0.4milliGRAM(s) Oral at bedtime  lactulose Syrup 20Gram(s) Oral every other day  amoxicillin  875 milliGRAM(s)/clavulanate 1Tablet(s) Oral two times a day    MEDICATIONS  (PRN):  ondansetron Injectable 4milliGRAM(s) IV Push every 6 hours PRN Nausea and/or Vomiting      Vital Signs Last 24 Hrs  T(C): 36.6, Max: 36.8 (05-05 @ 05:01)  T(F): 97.9, Max: 98.2 (05-05 @ 05:01)  HR: 92 (81 - 99)  BP: 167/71 (134/70 - 167/71)  BP(mean): --  RR: 20 (17 - 22)  SpO2: 98% (93% - 98%)    I&O's Summary  I & Os for 24h ending 05 May 2017 07:00  =============================================  IN: 920 ml / OUT: 0 ml / NET: 920 ml    I & Os for current day (as of 05 May 2017 21:01)  =============================================  IN: 280 ml / OUT: 60 ml / NET: 220 ml      PHYSICAL EXAM:  HEENT: NC/AT; PERRLA  Neck: Soft; no tenderness  Lungs: CTA bilaterally; no wheezing.   Heart: RRR; no murmurs.  Abdomen: Soft; no masses.   Extremities: No ulcers.      LABORATORY:    CBC Full  -  ( 04 May 2017 16:03 )  WBC Count : 8.7 K/uL  Hemoglobin : 10.8 g/dL  Hematocrit : 34.1 %  Platelet Count - Automated : 126 K/uL  Mean Cell Volume : 87.4 fl  Mean Cell Hemoglobin : 27.6 pg  Mean Cell Hemoglobin Concentration : 31.6 gm/dL  Auto Neutrophil # : x  Auto Lymphocyte # : x  Auto Monocyte # : x  Auto Eosinophil # : x  Auto Basophil # : x  Auto Neutrophil % : x  Auto Lymphocyte % : x  Auto Monocyte % : x  Auto Eosinophil % : x  Auto Basophil % : x          05-04    144  |  113<H>  |  10  ----------------------------<  82  3.1<L>   |  20<L>  |  0.88    Ca    8.1<L>      04 May 2017 16:03      Assessment and Plan:    1. UTI   2. Marked left hydronephrosis.  3. Obstructed left renal stones.    . Hospice care.  . po Augmentin for 3 more days.  . Discharge planning.      Pranay Bolden MD   [phone].

## 2017-05-05 NOTE — PROGRESS NOTE ADULT - SUBJECTIVE AND OBJECTIVE BOX
Chief Complaint: AMS, fever    Interval Events: No events overnight.    Review of Systems:  General: No fevers, chills, weight loss or gain  Skin: No rashes, color changes  Cardiovascular: No chest pain, orthopnea  Respiratory: No shortness of breath, cough  Gastrointestinal: No nausea, abdominal pain  Genitourinary: No incontinence, pain with urination  Musculoskeletal: No pain, swelling, decreased range of motion  Neurological: No headache, weakness  Psychiatric: No depression, anxiety  Endocrine: No weight loss or gain, increased thirst  All other systems are comprehensively negative.    Physical Exam:  Vital Signs Last 24 Hrs  T(C): 36.6, Max: 36.6 (05-05 @ 05:42)  T(F): 97.8, Max: 97.8 (05-05 @ 05:42)  HR: 85 (85 - 85)  BP: 145/84 (145/84 - 145/84)  BP(mean): --  RR: 17 (17 - 17)  SpO2: 95% (95% - 95%)  General: NAD  Neck: No JVD, no carotid bruit  Lungs: bilateral wheezing  CV: RRR, nl S1/S2, no M/R/G  Abdomen: S/NT/ND, +BS  Extremities: No LE edema, no cyanosis

## 2017-05-05 NOTE — PROGRESS NOTE ADULT - SUBJECTIVE AND OBJECTIVE BOX
DATE       Patient seen Plan discussed/Questions answered (with patient/ancillary staff/colleagues) Chart notes reviewd More detailed Pulm/Critical Care notes, assessment and plan  will be added later today  (to current note) after reviewing labs problem list     Remarkable Interval history if any elicited is noted Postop colon surgery Asked to see pt again    ROS/PE     REVIEW OF SYMPTOMS    Able to give ROS  Yes     RELIABLE NO   Weakness Yes   Chills No   Vision changes No  Lymph nodes No enlarged glands   Endocrine No unexplained hair loss No het or cold intolerance   Allergy No hives   Sore throat No   Coughing blood No  Headache No  Confusion YES  Chest pain No  Palpitations No   Pain abdomen NO   Shortness of breath YES  Vomiting NO  Pain neck No  Pain abdomen NO     PHYSICAL EXAM    HEENT Unremarkable PERRLA atraumatic  RESP Fair air entry EXP prolonged    Harsh breath sound Resp distres mild  CARDIAC S1 S2 No S3     NO JVD   Awake Alert and ORIENTED x 2  ABDOMEN SOFT BS PRESENT NOT DISTENDED No hepatosplenomegaly  PEDAL EDEMA present No calf tenderness  NO rash GENERAL Not TOXIC looking  . DATE       Patient seen Plan discussed/Questions answered (with patient/ancillary staff/colleagues) Chart notes reviewd More detailed Pulm/Critical Care notes, assessment and plan  will be added later today  (to current note) after reviewing labs problem list     Remarkable Interval history if any elicited is noted Postop colon surgery Asked to see pt again    ROS/PE     REVIEW OF SYMPTOMS    Able to give ROS  Yes     RELIABLE NO   Weakness Yes   Chills No   Vision changes No  Lymph nodes No enlarged glands   Endocrine No unexplained hair loss No het or cold intolerance   Allergy No hives   Sore throat No   Coughing blood No  Headache No  Confusion YES  Chest pain No  Palpitations No   Pain abdomen NO   Shortness of breath YES  Vomiting NO  Pain neck No  Pain abdomen NO     PHYSICAL EXAM    HEENT Unremarkable PERRLA atraumatic  RESP Fair air entry EXP prolonged    Harsh breath sound Resp distres mild  CARDIAC S1 S2 No S3     NO JVD   Awake Alert and ORIENTED x 2  ABDOMEN SOFT BS PRESENT NOT DISTENDED No hepatosplenomegaly  PEDAL EDEMA present No calf tenderness  NO rash GENERAL Not TOXIC looking  . PATIENT DESCRIPTION  89 yo female ,[facility] resident PMH CAD,HTN,HLD,DVT ,Breast ca,colon ca, s/p colostomy ,OA admited [facility] 4/27/2017 with renal colic, severe hydronephrosis with obstructing kidney stone  Pulm consulted 4/28 as she was hypoxic and has is opac on CXR   HOSPITAL COURSE   89 f admitted 4/27 with L obstr hydro sec renal stones but family declined invasive intervention Rxed zosyn (4/27) for UTI  (4/27 uc 100k Proteus m)  and pneumonia On  4/27 bc gpc c  Vanco added to zosyn on 4/29  Creat elevated 1.6 (ROSEMARIE) 4/29 ECHO showed ef 65% rvsp 64   I had stopped following patient as she was going to hospice but she developed prolapse of colon through colostomy had surgery 5/5 and I was asked to follow postop as she was sent to tele   RECENT EVENTS   5/5/2017 Ileostomy,  Ileocolic resection  Colostomy takedown  05/05/2017     Operative Findings 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  VITALS  5/5/2017 afeb 85 140/80 95%   5/4/2017 W 8.7 Hb 10.8 Plt 126 Na 144 K 3.1 CO2 20 Cr .8   PROBLEM/ASSESSMENT/PLAN  PROBLEM: RESP   5/5  RA 95%  ASSESSMENT/RECOMMENDATION/PLAN  Pulse ox acceptable   PROBLEM:   PROTEUS M UTI (4/27 UC)   ASSESSMENT/RECOMMENDATION/PLAN On augmentin (5/5) lactobacillus (5/5) GLOBAL ISSUE/BEST PRACTICE:        PROBLEM:      Analgesia:        Tylenol (4/27)                      PROBLEM: Sedation:       na             PROBLEM: HOB elevation: yes               PROBLEM: Stress ulcer proph:     na                     PROBLEM: VTE prophylaxis:  hsc (5/5)  PROBLEM: Glycemic control:    na            PROBLEM: Nutrition:  dys 2 mech soft thin (5/5)   PROBLEM: Advanced directive:  dnr                                TIME SPENT Over 25 minutes aggregate time spent on encounter; activities included   direct patient care, counseling and/or coordinating care reviewing notes, labs data/ imaging , discussion with multidisciplinary team.  Plan of care discussed with patient  and/family in detail who expressed understanding of the management plan . Risks, benefits, alternatives  discussed in detail. Questions/concerns  were addressed  to the best of my ability .

## 2017-05-06 LAB
ALBUMIN SERPL ELPH-MCNC: 2.5 G/DL — LOW (ref 3.3–5)
ALP SERPL-CCNC: 62 U/L — SIGNIFICANT CHANGE UP (ref 40–120)
ALT FLD-CCNC: 12 U/L — SIGNIFICANT CHANGE UP (ref 12–78)
ANION GAP SERPL CALC-SCNC: 13 MMOL/L — SIGNIFICANT CHANGE UP (ref 5–17)
AST SERPL-CCNC: 16 U/L — SIGNIFICANT CHANGE UP (ref 15–37)
BILIRUB SERPL-MCNC: 0.7 MG/DL — SIGNIFICANT CHANGE UP (ref 0.2–1.2)
BUN SERPL-MCNC: 10 MG/DL — SIGNIFICANT CHANGE UP (ref 7–23)
CALCIUM SERPL-MCNC: 8.8 MG/DL — SIGNIFICANT CHANGE UP (ref 8.5–10.1)
CHLORIDE SERPL-SCNC: 114 MMOL/L — HIGH (ref 96–108)
CO2 SERPL-SCNC: 22 MMOL/L — SIGNIFICANT CHANGE UP (ref 22–31)
CREAT SERPL-MCNC: 1.1 MG/DL — SIGNIFICANT CHANGE UP (ref 0.5–1.3)
GLUCOSE SERPL-MCNC: 131 MG/DL — HIGH (ref 70–99)
HCT VFR BLD CALC: 36.7 % — SIGNIFICANT CHANGE UP (ref 34.5–45)
HGB BLD-MCNC: 11.7 G/DL — SIGNIFICANT CHANGE UP (ref 11.5–15.5)
LYMPHOCYTES # BLD AUTO: 5 % — LOW (ref 13–44)
MCHC RBC-ENTMCNC: 28.1 PG — SIGNIFICANT CHANGE UP (ref 27–34)
MCHC RBC-ENTMCNC: 32 GM/DL — SIGNIFICANT CHANGE UP (ref 32–36)
MCV RBC AUTO: 87.9 FL — SIGNIFICANT CHANGE UP (ref 80–100)
MONOCYTES NFR BLD AUTO: 2 % — SIGNIFICANT CHANGE UP (ref 1–9)
MYELOCYTES NFR BLD: 2 % — HIGH (ref 0–0)
NEUTROPHILS NFR BLD AUTO: 91 % — HIGH (ref 43–77)
PLAT MORPH BLD: NORMAL — SIGNIFICANT CHANGE UP
PLATELET # BLD AUTO: 191 K/UL — SIGNIFICANT CHANGE UP (ref 150–400)
POTASSIUM SERPL-MCNC: 3.2 MMOL/L — LOW (ref 3.5–5.3)
POTASSIUM SERPL-SCNC: 3.2 MMOL/L — LOW (ref 3.5–5.3)
PROT SERPL-MCNC: 7 G/DL — SIGNIFICANT CHANGE UP (ref 6–8.3)
RBC # BLD: 4.17 M/UL — SIGNIFICANT CHANGE UP (ref 3.8–5.2)
RBC # FLD: 14.3 % — SIGNIFICANT CHANGE UP (ref 10.3–14.5)
RBC BLD AUTO: NORMAL — SIGNIFICANT CHANGE UP
SODIUM SERPL-SCNC: 149 MMOL/L — HIGH (ref 135–145)
WBC # BLD: 18.6 K/UL — HIGH (ref 3.8–10.5)
WBC # FLD AUTO: 18.6 K/UL — HIGH (ref 3.8–10.5)

## 2017-05-06 PROCEDURE — 74000: CPT | Mod: 26

## 2017-05-06 RX ORDER — NYSTATIN CREAM 100000 [USP'U]/G
1 CREAM TOPICAL
Qty: 0 | Refills: 0 | Status: DISCONTINUED | OUTPATIENT
Start: 2017-05-06 | End: 2017-05-08

## 2017-05-06 RX ORDER — MORPHINE SULFATE 50 MG/1
2 CAPSULE, EXTENDED RELEASE ORAL EVERY 4 HOURS
Qty: 0 | Refills: 0 | Status: DISCONTINUED | OUTPATIENT
Start: 2017-05-06 | End: 2017-05-08

## 2017-05-06 RX ORDER — HYDROCORTISONE 1 %
1 OINTMENT (GRAM) TOPICAL ONCE
Qty: 0 | Refills: 0 | Status: COMPLETED | OUTPATIENT
Start: 2017-05-06 | End: 2017-05-06

## 2017-05-06 RX ORDER — SODIUM CHLORIDE 9 MG/ML
1000 INJECTION INTRAMUSCULAR; INTRAVENOUS; SUBCUTANEOUS
Qty: 0 | Refills: 0 | Status: DISCONTINUED | OUTPATIENT
Start: 2017-05-06 | End: 2017-05-07

## 2017-05-06 RX ADMIN — TAMSULOSIN HYDROCHLORIDE 0.4 MILLIGRAM(S): 0.4 CAPSULE ORAL at 21:51

## 2017-05-06 RX ADMIN — MORPHINE SULFATE 2 MILLIGRAM(S): 50 CAPSULE, EXTENDED RELEASE ORAL at 17:10

## 2017-05-06 RX ADMIN — Medication 1 APPLICATION(S): at 17:43

## 2017-05-06 RX ADMIN — Medication 1 TABLET(S): at 08:21

## 2017-05-06 RX ADMIN — Medication 100 MICROGRAM(S): at 05:07

## 2017-05-06 RX ADMIN — Medication 1 TABLET(S): at 17:43

## 2017-05-06 RX ADMIN — Medication 1 TABLET(S): at 05:07

## 2017-05-06 RX ADMIN — NYSTATIN CREAM 1 APPLICATION(S): 100000 CREAM TOPICAL at 17:44

## 2017-05-06 RX ADMIN — SODIUM CHLORIDE 100 MILLILITER(S): 9 INJECTION INTRAMUSCULAR; INTRAVENOUS; SUBCUTANEOUS at 18:42

## 2017-05-06 RX ADMIN — HEPARIN SODIUM 5000 UNIT(S): 5000 INJECTION INTRAVENOUS; SUBCUTANEOUS at 18:40

## 2017-05-06 RX ADMIN — HEPARIN SODIUM 5000 UNIT(S): 5000 INJECTION INTRAVENOUS; SUBCUTANEOUS at 05:07

## 2017-05-06 NOTE — CHART NOTE - NSCHARTNOTEFT_GEN_A_CORE
Called by RN for patient with red, warm rash on cheeks. Family at bedside and concerned.  Patient seen and examined.  Her right cheek was very red and hot to touch.  Her left cheek had only a small area of redness.  Appears to be flushed.  No new meds started.  Nurse mentions that the family member may have just cleaned her face with some product.         T(C): 37.7, Max: 37.7 (05-06 @ 13:49)  HR: 94 (94 - 96)  BP: 144/79 (133/100 - 144/79)  RR: 17 (17 - 17)  SpO2: 97% (94% - 97%)  Wt(kg): --    Physical :  Gen- NAD, patient resting comfortably  Face- right cheek erythematous and hot.  Small area on left cheek with same findings.   Cardio - s+1,s+2  Lung - cta b/l  Abdomen- +BS, NT/ND, ostomy, red skin in the groin fold just below the ostomy area.  Likely fungal rash from moisture  Ext- no edema      LABS:                        10.8   8.7   )-----------( 126      ( 04 May 2017 16:03 )             34.1     05-04    144  |  113<H>  |  10  ----------------------------<  82  3.1<L>   |  20<L>  |  0.88    Ca    8.1<L>      04 May 2017 16:03                  Assessment/Plan  89yFemale with rash on cheeks.  Currently on hospice care.     1. Flushing - possibly due to something used to clean patients face. Will put cold compress and one time cortisone cream.  Already on antibiotics.  Doubt cellulitis at this time.  2. Fungal rash- in groin, under ostomy site - will start on nystatin cream  3. Will follow as needed

## 2017-05-06 NOTE — PROGRESS NOTE ADULT - SUBJECTIVE AND OBJECTIVE BOX
Patient seen and examined today Plan discussed/Questions answered  (with patient/ancillary staff/colleagues) Chart notes reviewd More detailed Pulm/Critical Care notes, assessment and plan  will be added later today as needed after reviewing further labs as they become available     Notable interval events reviewed sp surgery prolapsed colon 5/5 Seems comfortable Home hospice  being planned  Pt back on cmo     ROS/PE  . REVIEW OF SYSTEMS Patient is unable to give any reliable review of systems     PHYSICAL EXAM    HEENT Unremarkable PERRLA atraumatic  RESP Fair air entry EXP prolonged    Harsh breath sound Resp distres mild  CARDIAC S1 S2 No S3     NO JVD   Awake Alert and ORIENTED x one   ABDOMEN SOFT BS PRESENT NOT DISTENDED No hepatosplenomegaly  PEDAL EDEMA present No calf tenderness  NO rash GENERAL Not TOXIC looking  . Patient seen and examined today Plan discussed/Questions answered  (with patient/ancillary staff/colleagues) Chart notes reviewd More detailed Pulm/Critical Care notes, assessment and plan  will be added later today as needed after reviewing further labs as they become available     Notable interval events reviewed sp surgery prolapsed colon 5/5 Seems comfortable Home hospice  being planned  Pt back on cmo     ROS/PE  . REVIEW OF SYSTEMS Patient is unable to give any reliable review of systems     PHYSICAL EXAM    HEENT Unremarkable PERRLA atraumatic  RESP Fair air entry EXP prolonged    Harsh breath sound Resp distres mild  CARDIAC S1 S2 No S3     NO JVD   Awake Alert and ORIENTED x one   ABDOMEN SOFT BS PRESENT NOT DISTENDED No hepatosplenomegaly  PEDAL EDEMA present No calf tenderness  NO rash GENERAL Not TOXIC looking  . PATIENT DESCRIPTION  89 yo female ,[facility] resident PMH CAD,HTN,HLD,DVT ,Breast ca,colon ca, s/p colostomy ,OA admited [facility] 4/27/2017 with renal colic, severe hydronephrosis with obstructing kidney stone  Pulm consulted 4/28 as she was hypoxic and has is opac on CXR   HOSPITAL COURSE   89 f admitted 4/27 with L obstr hydro sec renal stones but family declined invasive intervention Rxed zosyn (4/27) for UTI  (4/27 uc 100k Proteus m)  and pneumonia On  4/27 bc gpc c  Vanco added to zosyn on 4/29  Creat elevated 1.6 (ROSEMARIE) 4/29 ECHO showed ef 65% rvsp 64   I had stopped following patient as she was going to hospice but she developed prolapse of colon through colostomy had surgery 5/5 and I was asked to follow postop as she was sent to tele Patient is back on dc planninng for hospice placement and no further labs are being done I will sign off again Pl reconsult as aneeded   VITALS  5/6/2017 afeb 96 140/80 17 94%   5/5/2017 afeb 85 140/80 95%   5/4/2017 W 8.7 Hb 10.8 Plt 126 Na 144 K 3.1 CO2 20 Cr .8   PROBLEM/ASSESSMENT/PLAN  PROBLEM: RESP   5/5  RA 95%  ASSESSMENT/RECOMMENDATION/PLAN  Pulse ox acceptable   PROBLEM:   PROTEUS M UTI (4/27 UC)   ASSESSMENT/RECOMMENDATION/PLAN On augmentin (5/5) lactobacillus (5/5)   GLOBAL ISSUE/BEST PRACTICE:        PROBLEM:      Analgesia:        Tylenol (4/27)                      PROBLEM: Sedation:       na             PROBLEM: HOB elevation: yes               PROBLEM: Stress ulcer proph:     na                     PROBLEM: VTE prophylaxis:  hsc (5/5)  PROBLEM: Glycemic control:    na            PROBLEM: Nutrition:  dys 2 mech soft thin (5/5)   PROBLEM: Advanced directive:  dnr                                TIME SPENT Over 25 minutes aggregate time spent on encounter; activities included   direct patient care, counseling and/or coordinating care reviewing notes, labs data/ imaging , discussion with multidisciplinary team.  Plan of care discussed with patient  and/family in detail who expressed understanding of the management plan . Risks, benefits, alternatives  discussed in detail. Questions/concerns  were addressed  to the best of my ability .

## 2017-05-06 NOTE — PROGRESS NOTE ADULT - SUBJECTIVE AND OBJECTIVE BOX
INTERVAL HPI/OVERNIGHT EVENTS:  ostomy revised yesterday  patient felt flushed       MEDICATIONS  (STANDING):  heparin  Injectable 5000Unit(s) SubCutaneous every 12 hours  sodium chloride 0.9%. 1000milliLiter(s) IV Continuous <Continuous>  levothyroxine 100MICROGram(s) Oral daily  lactobacillus acidophilus 1Tablet(s) Oral two times a day with meals  tamsulosin 0.4milliGRAM(s) Oral at bedtime  amoxicillin  875 milliGRAM(s)/clavulanate 1Tablet(s) Oral two times a day  nystatin Cream 1Application(s) Topical two times a day  hydrocortisone 0.5% Cream 1Application(s) Topical once    MEDICATIONS  (PRN):  ondansetron Injectable 4milliGRAM(s) IV Push every 6 hours PRN Nausea and/or Vomiting  morphine  - Injectable 2milliGRAM(s) IV Push every 4 hours PRN Moderate Pain (4 - 6)      Allergies    No Known Allergies    Intolerances        ROS:   General:  No wt loss, fevers, chills, night sweats, fatigue,   Eyes:  Good vision, no reported pain  ENT:  No sore throat, pain, runny nose, dysphagia  CV:  No pain, palpitations, hypo/hypertension  Resp:  No dyspnea, cough, tachypnea, wheezing  GI:  No pain, No nausea, No vomiting, No diarrhea, No constipation, No weight loss, No fever, No pruritis, No rectal bleeding, No tarry stools, No dysphagia,  :  No pain, bleeding, incontinence, nocturia  Muscle:  No pain, weakness  Neuro:  No weakness, tingling, memory problems  Psych:  No fatigue, insomnia, mood problems, depression  Endocrine:  No polyuria, polydipsia, cold/heat intolerance  Heme:  No petechiae, ecchymosis, easy bruisability  Skin:  No rash, tattoos, scars, edema      PHYSICAL EXAM:   Vital Signs:  Vital Signs Last 24 Hrs  T(C): 37.7, Max: 37.7 (05-06 @ 13:49)  T(F): 99.8, Max: 99.8 (05-06 @ 13:49)  HR: 94 (94 - 96)  BP: 144/79 (133/100 - 144/79)  BP(mean): --  RR: 17 (17 - 17)  SpO2: 97% (94% - 97%)  Daily     Daily     GENERAL:  Appears stated age, well-groomed, well-nourished, no distress  HEENT:  NC/AT,  conjunctivae clear and pink, no thyromegaly, nodules, adenopathy, no JVD, sclera -anicteric  CHEST:  Full & symmetric excursion, no increased effort, breath sounds clear  HEART:  Regular rhythm, S1, S2, no murmur/rub/S3/S4, no abdominal bruit, no edema  ABDOMEN:  Soft, non-tender, non-distended, normoactive bowel sounds,  no masses ,no hepato-splenomegaly, no signs of chronic liver disease  EXTEREMITIES:  no cyanosis,clubbing or edema  SKIN:  No rash/erythema/ecchymoses/petechiae/wounds/abscess/warm/dry  NEURO:  Alert, oriented, no asterixis, no tremor, no encephalopathy      LABS:                        10.8   8.7   )-----------( 126      ( 04 May 2017 16:03 )             34.1     05-04    144  |  113<H>  |  10  ----------------------------<  82  3.1<L>   |  20<L>  |  0.88    Ca    8.1<L>      04 May 2017 16:03            RADIOLOGY & ADDITIONAL TESTS:

## 2017-05-06 NOTE — PROGRESS NOTE ADULT - SUBJECTIVE AND OBJECTIVE BOX
DEPARTMENT OF ANESTHESIA  POST ANESTHETIC EVALUATION    The Patient was interviewed and evaluated    Vital Signs Last 24 Hrs  T(C): 36.6, Max: 36.8 (05-05 @ 09:21)  T(F): 97.9, Max: 98.2 (05-05 @ 09:21)  HR: 96 (81 - 99)  BP: 133/100 (133/100 - 167/71)  BP(mean): --  RR: 17 (17 - 22)  SpO2: 94% (93% - 98%)    Evaluation:      (x ) No apparent complications or complaints regarding anesthesia care at this time  (x ) All questions were answered    Condition:  ( ) Stable      (x ) Guarded      ( ) Critical    Recommendations:  (x ) None     ( ) Other:

## 2017-05-06 NOTE — PROGRESS NOTE ADULT - ASSESSMENT
Dx UTI, comfort care s/p revision of ostomy  Morphine for post op pain control  cbc and bmp post op check  remainder of care as per hospitalist and paliative care team.

## 2017-05-06 NOTE — PROGRESS NOTE ADULT - SUBJECTIVE AND OBJECTIVE BOX
Chief Complaint: AMS, fever    Interval Events: Underwent colostomy repair yesterday.    Review of Systems:  General: No fevers, chills, weight loss or gain  Skin: No rashes, color changes  Cardiovascular: No chest pain, orthopnea  Respiratory: No shortness of breath, cough  Gastrointestinal: No nausea, abdominal pain  Genitourinary: No incontinence, pain with urination  Musculoskeletal: No pain, swelling, decreased range of motion  Neurological: No headache, weakness  Psychiatric: No depression, anxiety  Endocrine: No weight loss or gain, increased thirst  All other systems are comprehensively negative.    Physical Exam:  Vital Signs Last 24 Hrs  T(C): 36.6, Max: 36.8 (05-05 @ 09:21)  T(F): 97.9, Max: 98.2 (05-05 @ 09:21)  HR: 96 (81 - 99)  BP: 133/100 (133/100 - 167/71)  BP(mean): --  RR: 17 (17 - 22)  SpO2: 94% (93% - 98%)  General: NAD  Neck: No JVD, no carotid bruit  Lungs: bilateral wheezing  CV: RRR, nl S1/S2, no M/R/G  Abdomen: S/NT/ND, +BS  Extremities: No LE edema, no cyanosis    Labs:  05-04    144  |  113<H>  |  10  ----------------------------<  82  3.1<L>   |  20<L>  |  0.88    Ca    8.1<L>      04 May 2017 16:03                            10.8   8.7   )-----------( 126      ( 04 May 2017 16:03 )             34.1

## 2017-05-06 NOTE — PROGRESS NOTE ADULT - SUBJECTIVE AND OBJECTIVE BOX
HPI:  Pt is a 89 yo female ,[facility] resident with hx od CAD,HTN,HLD,DVT ,Breast ca,colon ca, s/p colostomy ,OA who presents to the ED with a cc of fever T max of 101.  Pt confused and unable  provide further history at this time Diagnosed with renal colic,found to have moderate to severe hydronephrosis with obstructing kidney stone and  eval called by ER Scheduled for OR in am for stent placement as per Dr Chang, urologist arrangement Admit for septic workup and ID evaluation,send blood and urine cx,serial lactate levels,monitor vitals closley,ivfs hydration,monitor urine output and renal profile,iv abx as per id cons Dr Bolden Palliative care consult requested due to overall poor prognosis,to discuss advance directives and complete MOLST (27 Apr 2017 20:30)  Patient this am is non verbal, but she appears comfortable.      PAST MEDICAL & SURGICAL HISTORY:  Hypothyroidism: newly diagnosed, started on T4 4/2013  Osteoarthritis  Colon cancer: &gt;30 years ago  Breast CA: &quot;many years ago&quot;  DVT (deep venous thrombosis): left leg not on A/C  HLD (hyperlipidemia)  Dementia  History of hysterectomy  Colostomy in place  Colon perforation  Ileostomy in place      REVIEW OF SYSTEMS      Unable to assess.  She is noncomunicative.:	    MEDICATIONS  (STANDING):  heparin  Injectable 5000Unit(s) SubCutaneous every 12 hours  sodium chloride 0.9%. 1000milliLiter(s) IV Continuous <Continuous>  levothyroxine 100MICROGram(s) Oral daily  lactobacillus acidophilus 1Tablet(s) Oral two times a day with meals  tamsulosin 0.4milliGRAM(s) Oral at bedtime  amoxicillin  875 milliGRAM(s)/clavulanate 1Tablet(s) Oral two times a day    MEDICATIONS  (PRN):  ondansetron Injectable 4milliGRAM(s) IV Push every 6 hours PRN Nausea and/or Vomiting      Allergies    No Known Allergies    Intolerances        SOCIAL HISTORY:    FAMILY HISTORY:  No pertinent family history in first degree relatives      Vital Signs Last 24 Hrs  T(C): 36.6, Max: 36.8 (05-05 @ 09:21)  T(F): 97.9, Max: 98.2 (05-05 @ 09:21)  HR: 96 (81 - 99)  BP: 133/100 (133/100 - 167/71)  BP(mean): --  RR: 17 (17 - 22)  SpO2: 94% (93% - 98%)    PHYSICAL EXAM:      Constitutional:  Well nourished in NAD.   She is comfortable.    Gastrointestinal:  soft tender around ostomy          LABS:                        10.8   8.7   )-----------( 126      ( 04 May 2017 16:03 )             34.1     05-04    144  |  113<H>  |  10  ----------------------------<  82  3.1<L>   |  20<L>  |  0.88    Ca    8.1<L>      04 May 2017 16:03

## 2017-05-06 NOTE — PROGRESS NOTE ADULT - PROBLEM SELECTOR PLAN 2
S/P correction of prolapsed transverse loop colostomy with intussuscepting cecum  con't post-op care

## 2017-05-06 NOTE — PROGRESS NOTE ADULT - ASSESSMENT
The patient is a 89 year old female with a history of HL, DVT, breast cancer, colon cancer s/p colostomy, hypothyroidism, dementia who is admitted with urosepsis.    Plan:  - Off of cardiac meds (i.e. labetalol)  - Plan for hospice on Monday

## 2017-05-06 NOTE — PROGRESS NOTE ADULT - SUBJECTIVE AND OBJECTIVE BOX
PROGRESS NOTE    OVERNIGHT    SUBJECTIVE    PAST MEDICAL & SURGICAL HISTORY:  Hypothyroidism: newly diagnosed, started on T4 4/2013  Osteoarthritis  Colon cancer: &gt;30 years ago  Breast CA: &quot;many years ago&quot;  DVT (deep venous thrombosis): left leg not on A/C  HLD (hyperlipidemia)  Dementia  History of hysterectomy  Colostomy in place  Colon perforation  Ileostomy in place    HEALTH ISSUES - PROBLEM Dx:  Colostomy prolapse: Colostomy prolapse  Goals of care, counseling/discussion: Goals of care, counseling/discussion  Hydronephrosis with urinary obstruction due to ureteral calculus: Hydronephrosis with urinary obstruction due to ureteral calculus  Sepsis, due to unspecified organism: Sepsis, due to unspecified organism  Dementia without behavioral disturbance, unspecified dementia type: Dementia without behavioral disturbance, unspecified dementia type  HLD (hyperlipidemia): HLD (hyperlipidemia)  Colostomy in place: Colostomy in place  Dementia: Dementia  Osteoarthritis: Osteoarthritis  DVT (deep venous thrombosis): DVT (deep venous thrombosis)  Hypothyroidism: Hypothyroidism  Heart murmur: Heart murmur  Nephrolithiasis: Nephrolithiasis  Urinary tract infection with hematuria, site unspecified: Urinary tract infection with hematuria, site unspecified        FAMILY HISTORY:  No pertinent family history in first degree relatives    Allergies    No Known Allergies    Intolerances        MEDICATIONS  (STANDING):  heparin  Injectable 5000Unit(s) SubCutaneous every 12 hours  sodium chloride 0.9%. 1000milliLiter(s) IV Continuous <Continuous>  levothyroxine 100MICROGram(s) Oral daily  lactobacillus acidophilus 1Tablet(s) Oral two times a day with meals  tamsulosin 0.4milliGRAM(s) Oral at bedtime  amoxicillin  875 milliGRAM(s)/clavulanate 1Tablet(s) Oral two times a day    MEDICATIONS  (PRN):  ondansetron Injectable 4milliGRAM(s) IV Push every 6 hours PRN Nausea and/or Vomiting  morphine  - Injectable 2milliGRAM(s) IV Push every 4 hours PRN Moderate Pain (4 - 6)      OBJECTIVE  PHYSICAL EXAM:  T(C): 36.6, Max: 36.8 (05-05 @ 09:21)  HR: 96 (81 - 99)  BP: 133/100 (133/100 - 167/71)  RR: 17 (17 - 22)  SpO2: 94% (93% - 98%)  Wt(kg): --  I&O's Summary    I & Os for current day (as of 06 May 2017 07:37)  =============================================  IN: 640 ml / OUT: 60 ml / NET: 580 ml      Appearance: NAD.   HEENT:   Moist oral mucosa. Conjunctiva clear b/l.   Lymphatic: No cervical lymphadenopathy  Cardiovascular: RRR with no m/r/g.  Respiratory: Lungs CTAB.  Gastrointestinal:  Soft, nontender. No rebound. No rigidity. 	  Skin: No rashes, No ecchymoses, No cyanosis.	  Neurologic: Non-focal. Moving all extremities. Following commands.  Extremities: No edema. No erythema. No calf tenderness. Malvin's negative.  Vascular: DP 2+ b/l.  	  LABS:                        10.8   8.7   )-----------( 126      ( 04 May 2017 16:03 )             34.1     05-04    144  |  113<H>  |  10  ----------------------------<  82  3.1<L>   |  20<L>  |  0.88    Ca    8.1<L>      04 May 2017 16:03            RADIOLOGY & ADDITIONAL TESTS:    ASSESSMENT/PLAN: PROGRESS NOTE    OVERNIGHT  no new events reported Colodtomy is functioning well  SUBJECTIVE  NAD VSS Resting in a bed comforatbly ,confused ,poor mentation Denies complains   PAST MEDICAL & SURGICAL HISTORY:  Hypothyroidism: newly diagnosed, started on T4 4/2013  Osteoarthritis  Colon cancer: &gt;30 years ago  Breast CA: &quot;many years ago&quot;  DVT (deep venous thrombosis): left leg not on A/C  HLD (hyperlipidemia)  Dementia  History of hysterectomy  Colostomy in place  Colon perforation  Ileostomy in place    HEALTH ISSUES - PROBLEM Dx:  Colostomy prolapse: Colostomy prolapse  Goals of care, counseling/discussion: Goals of care, counseling/discussion  Hydronephrosis with urinary obstruction due to ureteral calculus: Hydronephrosis with urinary obstruction due to ureteral calculus  Sepsis, due to unspecified organism: Sepsis, due to unspecified organism  Dementia without behavioral disturbance, unspecified dementia type: Dementia without behavioral disturbance, unspecified dementia type  HLD (hyperlipidemia): HLD (hyperlipidemia)  Colostomy in place: Colostomy in place  Dementia: Dementia  Osteoarthritis: Osteoarthritis  DVT (deep venous thrombosis): DVT (deep venous thrombosis)  Hypothyroidism: Hypothyroidism  Heart murmur: Heart murmur  Nephrolithiasis: Nephrolithiasis  Urinary tract infection with hematuria, site unspecified: Urinary tract infection with hematuria, site unspecified        FAMILY HISTORY:  No pertinent family history in first degree relatives    Allergies    No Known Allergies    Intolerances        MEDICATIONS  (STANDING):  heparin  Injectable 5000Unit(s) SubCutaneous every 12 hours  sodium chloride 0.9%. 1000milliLiter(s) IV Continuous <Continuous>  levothyroxine 100MICROGram(s) Oral daily  lactobacillus acidophilus 1Tablet(s) Oral two times a day with meals  tamsulosin 0.4milliGRAM(s) Oral at bedtime  amoxicillin  875 milliGRAM(s)/clavulanate 1Tablet(s) Oral two times a day    MEDICATIONS  (PRN):  ondansetron Injectable 4milliGRAM(s) IV Push every 6 hours PRN Nausea and/or Vomiting  morphine  - Injectable 2milliGRAM(s) IV Push every 4 hours PRN Moderate Pain (4 - 6)      OBJECTIVE  PHYSICAL EXAM:  T(C): 36.6, Max: 36.8 (05-05 @ 09:21)  HR: 96 (81 - 99)  BP: 133/100 (133/100 - 167/71)  RR: 17 (17 - 22)  SpO2: 94% (93% - 98%)  Wt(kg): --  I&O's Summary    I & Os for current day (as of 06 May 2017 07:37)  =============================================  IN: 640 ml / OUT: 60 ml / NET: 580 ml      Appearance: NAD.   HEENT:   Moist oral mucosa. Conjunctiva clear b/l.   Lymphatic: No cervical lymphadenopathy  Cardiovascular: RRR with no m/r/g.S1S2 present   Respiratory: Lungs -decreased bs at bases   Gastrointestinal:  Soft, nontender. Colodtomy -fecal material . 	  Skin: No rashes, No ecchymoses, No cyanosis.	  Neurologic: Non-focal. Moving all extremities. Following commands.  Extremities: No edema. No erythema. No calf tenderness. Malvin's negativ  	  LABS:                        10.8   8.7   )-----------( 126      ( 04 May 2017 16:03 )             34.1     05-04    144  |  113<H>  |  10  ----------------------------<  82  3.1<L>   |  20<L>  |  0.88    Ca    8.1<L>      04 May 2017 16:03            RADIOLOGY & ADDITIONAL TESTS:    ASSESSMENT/PLAN: PROGRESS NOTE    OVERNIGHT  no new events reported Colostomy is functioning well  SUBJECTIVE  NAD VSS Resting in a bed comforatbly ,confused ,poor mentation Denies complains   PAST MEDICAL & SURGICAL HISTORY:  Hypothyroidism: newly diagnosed, started on T4 4/2013  Osteoarthritis  Colon cancer: &gt;30 years ago  Breast CA: &quot;many years ago&quot;  DVT (deep venous thrombosis): left leg not on A/C  HLD (hyperlipidemia)  Dementia  History of hysterectomy  Colostomy in place  Colon perforation  Ileostomy in place    HEALTH ISSUES - PROBLEM Dx:  Colostomy prolapse: Colostomy prolapse  Goals of care, counseling/discussion: Goals of care, counseling/discussion  Hydronephrosis with urinary obstruction due to ureteral calculus: Hydronephrosis with urinary obstruction due to ureteral calculus  Sepsis, due to unspecified organism: Sepsis, due to unspecified organism  Dementia without behavioral disturbance, unspecified dementia type: Dementia without behavioral disturbance, unspecified dementia type  HLD (hyperlipidemia): HLD (hyperlipidemia)  Colostomy in place: Colostomy in place  Dementia: Dementia  Osteoarthritis: Osteoarthritis  DVT (deep venous thrombosis): DVT (deep venous thrombosis)  Hypothyroidism: Hypothyroidism  Heart murmur: Heart murmur  Nephrolithiasis: Nephrolithiasis  Urinary tract infection with hematuria, site unspecified: Urinary tract infection with hematuria, site unspecified        FAMILY HISTORY:  No pertinent family history in first degree relatives    Allergies    No Known Allergies    Intolerances        MEDICATIONS  (STANDING):  heparin  Injectable 5000Unit(s) SubCutaneous every 12 hours  sodium chloride 0.9%. 1000milliLiter(s) IV Continuous <Continuous>  levothyroxine 100MICROGram(s) Oral daily  lactobacillus acidophilus 1Tablet(s) Oral two times a day with meals  tamsulosin 0.4milliGRAM(s) Oral at bedtime  amoxicillin  875 milliGRAM(s)/clavulanate 1Tablet(s) Oral two times a day    MEDICATIONS  (PRN):  ondansetron Injectable 4milliGRAM(s) IV Push every 6 hours PRN Nausea and/or Vomiting  morphine  - Injectable 2milliGRAM(s) IV Push every 4 hours PRN Moderate Pain (4 - 6)      OBJECTIVE  PHYSICAL EXAM:  T(C): 36.6, Max: 36.8 (05-05 @ 09:21)  HR: 96 (81 - 99)  BP: 133/100 (133/100 - 167/71)  RR: 17 (17 - 22)  SpO2: 94% (93% - 98%)  Wt(kg): --  I&O's Summary    I & Os for current day (as of 06 May 2017 07:37)  =============================================  IN: 640 ml / OUT: 60 ml / NET: 580 ml      Appearance: NAD.   HEENT:   Moist oral mucosa. Conjunctiva clear b/l.   Lymphatic: No cervical lymphadenopathy  Cardiovascular: RRR with no m/r/g.S1S2 present   Respiratory: Lungs -decreased bs at bases   Gastrointestinal:  Soft, nontender. Colodtomy -fecal material . 	  Skin: No rashes, No ecchymoses, No cyanosis.	  Neurologic: Non-focal. Moving all extremities. Following commands.  Extremities: No edema. No erythema. No calf tenderness. Malvin's negativ  	  LABS:                        10.8   8.7   )-----------( 126      ( 04 May 2017 16:03 )             34.1     05-04    144  |  113<H>  |  10  ----------------------------<  82  3.1<L>   |  20<L>  |  0.88    Ca    8.1<L>      04 May 2017 16:03            RADIOLOGY & ADDITIONAL TESTS:    ASSESSMENT/PLAN:

## 2017-05-06 NOTE — PROGRESS NOTE ADULT - ASSESSMENT
Pt is a 89 yo female ,[facility] resident with hx od CAD,HTN,HLD,DVT ,Breast ca,colon ca, s/p colostomy ,OA who presents to the ED with a cc of fever T max of 101.  Pt confused and unable  provide further history at this time Diagnosed with renal colic,found to have moderate to severe hydronephrosis with obstructing kidney stone and  eval called by ER Scheduled for OR in am for stent placement as per Dr Chang, urologist arrangement Family refused sx and reqyuested CMO Admit for septic workup and ID evaluation,send blood and urine cx,serial lactate levels,monitor vitals closley,ivfs hydration,monitor urine output and renal profile,iv abx as per id cons Dr Bolden Palliative care consult requested due to overall poor prognosis,to discuss advance directives and complete [facility] evaluation requested due to poor prognosis .Case discussed with palliative care representative,comfort care and palliative measures seem to be appropriate level of care at this point  Underwent repair of colostomy today-plollapsed colostomy was resected ,end ileostomy was created with mucous fistula EBL 50 cc ,IVFS 500 cc Case was disussed with sx cons Anticipate discharge on Monday back to [facility] with home hospice Pt is a 89 yo female ,[facility] resident with hx od CAD,HTN,HLD,DVT ,Breast ca,colon ca, s/p colostomy ,OA who presents to the ED with a cc of fever T max of 101.  Pt confused and unable  provide further history at this time Diagnosed with renal colic,found to have moderate to severe hydronephrosis with obstructing kidney stone and  eval called by ER Scheduled for OR in am for stent placement as per Dr Chang, urologist arrangement Family refused sx and reqyuested CMO Admit for septic workup and ID evaluation,send blood and urine cx,serial lactate levels,monitor vitals closley,ivfs hydration,monitor urine output and renal profile,iv abx as per id cons Dr Bolden Palliative care consult requested due to overall poor prognosis,to discuss advance directives and complete [facility] evaluation requested due to poor prognosis .Case discussed with palliative care representative,comfort care and palliative measures seem to be appropriate level of care at this point  Underwent repair of colostomy today-plollapsed colostomy was resected ,end ileostomy was created with mucous fistula EBL 50 cc ,IVFS 500 cc Case was disussed with med staff  Anticipate discharge on Monday back to [facility] with home hospice

## 2017-05-07 VITALS
RESPIRATION RATE: 18 BRPM | TEMPERATURE: 100 F | HEART RATE: 107 BPM | DIASTOLIC BLOOD PRESSURE: 81 MMHG | SYSTOLIC BLOOD PRESSURE: 153 MMHG | OXYGEN SATURATION: 93 %

## 2017-05-07 DIAGNOSIS — K92.2 GASTROINTESTINAL HEMORRHAGE, UNSPECIFIED: ICD-10-CM

## 2017-05-07 DIAGNOSIS — E87.6 HYPOKALEMIA: ICD-10-CM

## 2017-05-07 DIAGNOSIS — E87.0 HYPEROSMOLALITY AND HYPERNATREMIA: ICD-10-CM

## 2017-05-07 LAB
ANION GAP SERPL CALC-SCNC: 13 MMOL/L — SIGNIFICANT CHANGE UP (ref 5–17)
BUN SERPL-MCNC: 12 MG/DL — SIGNIFICANT CHANGE UP (ref 7–23)
CALCIUM SERPL-MCNC: 8.7 MG/DL — SIGNIFICANT CHANGE UP (ref 8.5–10.1)
CHLORIDE SERPL-SCNC: 116 MMOL/L — HIGH (ref 96–108)
CO2 SERPL-SCNC: 22 MMOL/L — SIGNIFICANT CHANGE UP (ref 22–31)
CREAT SERPL-MCNC: 1 MG/DL — SIGNIFICANT CHANGE UP (ref 0.5–1.3)
GLUCOSE SERPL-MCNC: 127 MG/DL — HIGH (ref 70–99)
HCT VFR BLD CALC: 36.8 % — SIGNIFICANT CHANGE UP (ref 34.5–45)
HGB BLD-MCNC: 11.4 G/DL — LOW (ref 11.5–15.5)
MCHC RBC-ENTMCNC: 27.7 PG — SIGNIFICANT CHANGE UP (ref 27–34)
MCHC RBC-ENTMCNC: 31 GM/DL — LOW (ref 32–36)
MCV RBC AUTO: 89.4 FL — SIGNIFICANT CHANGE UP (ref 80–100)
PLATELET # BLD AUTO: 167 K/UL — SIGNIFICANT CHANGE UP (ref 150–400)
POTASSIUM SERPL-MCNC: 3.2 MMOL/L — LOW (ref 3.5–5.3)
POTASSIUM SERPL-SCNC: 3.2 MMOL/L — LOW (ref 3.5–5.3)
RBC # BLD: 4.11 M/UL — SIGNIFICANT CHANGE UP (ref 3.8–5.2)
RBC # FLD: 15.1 % — HIGH (ref 10.3–14.5)
SODIUM SERPL-SCNC: 151 MMOL/L — HIGH (ref 135–145)
WBC # BLD: 16.4 K/UL — HIGH (ref 3.8–10.5)
WBC # FLD AUTO: 16.4 K/UL — HIGH (ref 3.8–10.5)

## 2017-05-07 PROCEDURE — 81001 URINALYSIS AUTO W/SCOPE: CPT

## 2017-05-07 PROCEDURE — 87186 SC STD MICRODIL/AGAR DIL: CPT

## 2017-05-07 PROCEDURE — 84443 ASSAY THYROID STIM HORMONE: CPT

## 2017-05-07 PROCEDURE — 83605 ASSAY OF LACTIC ACID: CPT

## 2017-05-07 PROCEDURE — 82150 ASSAY OF AMYLASE: CPT

## 2017-05-07 PROCEDURE — 99285 EMERGENCY DEPT VISIT HI MDM: CPT | Mod: 25

## 2017-05-07 PROCEDURE — 87040 BLOOD CULTURE FOR BACTERIA: CPT

## 2017-05-07 PROCEDURE — 86901 BLOOD TYPING SEROLOGIC RH(D): CPT

## 2017-05-07 PROCEDURE — 71045 X-RAY EXAM CHEST 1 VIEW: CPT

## 2017-05-07 PROCEDURE — 83690 ASSAY OF LIPASE: CPT

## 2017-05-07 PROCEDURE — 85610 PROTHROMBIN TIME: CPT

## 2017-05-07 PROCEDURE — 94640 AIRWAY INHALATION TREATMENT: CPT

## 2017-05-07 PROCEDURE — 86022 PLATELET ANTIBODIES: CPT

## 2017-05-07 PROCEDURE — 80053 COMPREHEN METABOLIC PANEL: CPT

## 2017-05-07 PROCEDURE — 93970 EXTREMITY STUDY: CPT

## 2017-05-07 PROCEDURE — 93306 TTE W/DOPPLER COMPLETE: CPT

## 2017-05-07 PROCEDURE — 85027 COMPLETE CBC AUTOMATED: CPT

## 2017-05-07 PROCEDURE — 74018 RADEX ABDOMEN 1 VIEW: CPT

## 2017-05-07 PROCEDURE — 88307 TISSUE EXAM BY PATHOLOGIST: CPT

## 2017-05-07 PROCEDURE — 74176 CT ABD & PELVIS W/O CONTRAST: CPT

## 2017-05-07 PROCEDURE — 80202 ASSAY OF VANCOMYCIN: CPT

## 2017-05-07 PROCEDURE — 96365 THER/PROPH/DIAG IV INF INIT: CPT

## 2017-05-07 PROCEDURE — 80048 BASIC METABOLIC PNL TOTAL CA: CPT

## 2017-05-07 PROCEDURE — 86900 BLOOD TYPING SEROLOGIC ABO: CPT

## 2017-05-07 PROCEDURE — 84100 ASSAY OF PHOSPHORUS: CPT

## 2017-05-07 PROCEDURE — 83735 ASSAY OF MAGNESIUM: CPT

## 2017-05-07 PROCEDURE — 86850 RBC ANTIBODY SCREEN: CPT

## 2017-05-07 PROCEDURE — 70450 CT HEAD/BRAIN W/O DYE: CPT

## 2017-05-07 PROCEDURE — 87086 URINE CULTURE/COLONY COUNT: CPT

## 2017-05-07 PROCEDURE — 82803 BLOOD GASES ANY COMBINATION: CPT

## 2017-05-07 RX ORDER — POTASSIUM CHLORIDE 20 MEQ
40 PACKET (EA) ORAL ONCE
Qty: 0 | Refills: 0 | Status: COMPLETED | OUTPATIENT
Start: 2017-05-07 | End: 2017-05-07

## 2017-05-07 RX ORDER — SODIUM CHLORIDE 9 MG/ML
1000 INJECTION, SOLUTION INTRAVENOUS
Qty: 0 | Refills: 0 | Status: DISCONTINUED | OUTPATIENT
Start: 2017-05-07 | End: 2017-05-08

## 2017-05-07 RX ADMIN — Medication 1 TABLET(S): at 08:50

## 2017-05-07 RX ADMIN — Medication 40 MILLIEQUIVALENT(S): at 12:03

## 2017-05-07 RX ADMIN — Medication 1 TABLET(S): at 06:04

## 2017-05-07 RX ADMIN — MORPHINE SULFATE 2 MILLIGRAM(S): 50 CAPSULE, EXTENDED RELEASE ORAL at 09:40

## 2017-05-07 RX ADMIN — Medication 100 MICROGRAM(S): at 06:04

## 2017-05-07 RX ADMIN — MORPHINE SULFATE 2 MILLIGRAM(S): 50 CAPSULE, EXTENDED RELEASE ORAL at 09:23

## 2017-05-07 RX ADMIN — NYSTATIN CREAM 1 APPLICATION(S): 100000 CREAM TOPICAL at 17:59

## 2017-05-07 RX ADMIN — NYSTATIN CREAM 1 APPLICATION(S): 100000 CREAM TOPICAL at 06:04

## 2017-05-07 RX ADMIN — Medication 1 TABLET(S): at 17:59

## 2017-05-07 RX ADMIN — MORPHINE SULFATE 2 MILLIGRAM(S): 50 CAPSULE, EXTENDED RELEASE ORAL at 22:16

## 2017-05-07 RX ADMIN — HEPARIN SODIUM 5000 UNIT(S): 5000 INJECTION INTRAVENOUS; SUBCUTANEOUS at 06:04

## 2017-05-07 RX ADMIN — SODIUM CHLORIDE 40 MILLILITER(S): 9 INJECTION, SOLUTION INTRAVENOUS at 12:03

## 2017-05-07 NOTE — PROGRESS NOTE ADULT - SUBJECTIVE AND OBJECTIVE BOX
Patient is a 89y old  Female who presents with a chief complaint of fever (03 May 2017 10:29)      Subjective: Patient seen and examined at bedside. No acute events overnight. Lethargic. Not eating. Requiring pain meds. + Melena    PAIN: Y  DYSPNEA: N	  NAUS/VOM: N	  SECRETIONS: N	  AGITATION: N    UNABLE TO OBTAIN  due to: lethargy    T(C): 37.6, Max: 37.7 (05-06 @ 13:49)  HR: 107 (94 - 111)  BP: 153/81 (120/66 - 168/81)  RR: 18 (17 - 20)  SpO2: 93% (91% - 97%)  Wt(kg): --  GENERAL:  resting comfortably in NAD  HEENT:  NC/AT EOMI PERRL  NECK: supple no JVD  CVS:  +S1 S2 RRR  RESP: CTA B/L  GI:  soft NT/ND +BS, melena in colostomy  : no suprapubic tenderness  MUSC:  no lower extremity edema  PSYCH: lethargic  SKIN:  Warm, moist, no rashes   LYMPH: normal     MEDS REVIEWED	          OPIATE NAÏVE (Y/N)    No Known Allergies      LABS REVIEWED:                        11.4   16.4  )-----------( 167      ( 07 May 2017 07:31 )             36.8     05-07    151<H>  |  116<H>  |  12  ----------------------------<  127<H>  3.2<L>   |  22  |  1.00    Ca    8.7      07 May 2017 07:31    TPro  7.0  /  Alb  2.5<L>  /  TBili  0.7  /  DBili  x   /  AST  16  /  ALT  12  /  AlkPhos  62  05-06      IMAGING REVIEWED    ADVANCED DIRECTIVES:        DNR     DNI     LIVING WILL     MOLST    PSYCHOSOCIAL-SPIRITUAL ASSESSMENT:    ___Reviewed     ___Care  plan unchanged     _x__Care plan adjusted as below    GOALS OF CARE DISCUSSION  	_x__Palliative care info/counseling provided	    ___Family meeting  	___Advanced Directives addressed	    _x__See previous Palliative Medicine Note    AGENCY CHOICE DISCUSSED:   _x__HOSPICE   ___CALVARY  ___OTHER:              > 50% OF THE TIME SPENT IN COUNSELING AND COORDINATING CARE 	   Start:               End:  	       Minutes:      PROLONGED SERVICE             FACE TO FACE:    PT            PT & FAMILY	   Start:               End:  	       Minutes:      Advance Care Planning Time:

## 2017-05-07 NOTE — PROGRESS NOTE ADULT - SUBJECTIVE AND OBJECTIVE BOX
Patient has area which expanding lateral to ostomy    Abdomen soft incarcerated hernia lateral to ostomy reduced.  I could feel edges where it was fully reduced. Patient msoft and nontender post    Dx s/p revision of ostomy  Family wishes are for comfort care.  Patient comfortable now.   Abdelrahman have binder and observe as ostomy is reduced.

## 2017-05-07 NOTE — PROGRESS NOTE ADULT - ASSESSMENT
Pt is a 89 yo female ,[facility] resident with hx od CAD,HTN,HLD,DVT ,Breast ca,colon ca, s/p colostomy ,OA who presents to the ED with a cc of fever T max of 101.  Pt confused and unable  provide further history at this time Diagnosed with renal colic,found to have moderate to severe hydronephrosis with obstructing kidney stone and  eval called by ER Scheduled for OR in am for stent placement as per Dr Chang, urologist arrangement Family refused sx and reqyuested CMO Admit for septic workup and ID evaluation,send blood and urine cx,serial lactate levels,monitor vitals closley,ivfs hydration,monitor urine output and renal profile,iv abx as per id cons Dr Bolden Palliative care consult requested due to overall poor prognosis,to discuss advance directives and complete [facility] evaluation requested due to poor prognosis .Case discussed with palliative care representative,comfort care and palliative measures seem to be appropriate level of care at this point  Underwent repair of colostomy today-plollapsed colostomy was resected ,end ileostomy was created with mucous fistula EBL 50 cc ,IVFS 500 cc Case was disussed with med staff  Anticipate discharge on Monday back to [facility] with home hospice Pt is a 89 yo female ,[facility] resident with hx od CAD,HTN,HLD,DVT ,Breast ca,colon ca, s/p colostomy ,OA who presents to the ED with a cc of fever T max of 101.  Pt confused and unable  provide further history at this time Diagnosed with renal colic,found to have moderate to severe hydronephrosis with obstructing kidney stone and  eval called by ER Scheduled for OR in am for stent placement as per Dr Chang, urologist arrangement Family refused sx and reqyuested CMO Admit for septic workup and ID evaluation,send blood and urine cx,serial lactate levels,monitor vitals closley,ivfs hydration,monitor urine output and renal profile,iv abx as per id cons Dr Bolden Palliative care consult requested due to overall poor prognosis,to discuss advance directives and complete [facility] evaluation requested due to poor prognosis .Case discussed with palliative care representative,comfort care and palliative measures seem to be appropriate level of care at this point  Underwent repair of colostomy today-plollapsed colostomy was resected ,end ileostomy was created with mucous fistula EBL 50 cc ,IVFS 500 cc POD#2 Dark fecal material in colostomy was reported ,seen by Dr Ruiz this am , expanding area of herniation lateral to ostomy was noticed and reduced  ,followed by pall care MD Recieves morphine prn pain Poor po intake is reported Hospice followup win the ,morning is pending ,may meet criteria for inpatient hospice   Case discussed with med staff,Dr Ruiz and Dr Cui

## 2017-05-07 NOTE — PROGRESS NOTE ADULT - ASSESSMENT
Dx s/p revision of ostomy  Continue diet, no evidence obstruction, ostomy output tolerating diet  ostomy viable, small are of necrosis on surface  Bleeding from edge normal.  hernia reducible, she had hernia present at time of procedure without evidence of incarceration.

## 2017-05-07 NOTE — PROGRESS NOTE ADULT - SUBJECTIVE AND OBJECTIVE BOX
HPI:  Pt is a 89 yo female ,[facility] resident with hx od CAD,HTN,HLD,DVT ,Breast ca,colon ca, s/p colostomy ,OA who presents to the ED with a cc of fever T max of 101.  Pt confused and unable  provide further history at this time Diagnosed with renal colic,found to have moderate to severe hydronephrosis with obstructing kidney stone and  eval called by ER Scheduled for OR in am for stent placement as per Dr Chang, urologist arrangement Admit for septic workup and ID evaluation,send blood and urine cx,serial lactate levels,monitor vitals closley,ivfs hydration,monitor urine output and renal profile,iv abx as per id cons Dr Bolden Palliative care consult requested due to overall poor prognosis,to discuss advance directives and complete MOLST (27 Apr 2017 20:30)    Patient appears comfortable.  She appears to be tolerating a diet.  Ostomy is functioning.      PAST MEDICAL & SURGICAL HISTORY:  Hypothyroidism: newly diagnosed, started on T4 4/2013  Osteoarthritis  Colon cancer: &gt;30 years ago  Breast CA: &quot;many years ago&quot;  DVT (deep venous thrombosis): left leg not on A/C  HLD (hyperlipidemia)  Dementia  History of hysterectomy  Colostomy in place  Colon perforation  Ileostomy in place      REVIEW OF SYSTEMS      Not able to asses ROS	    MEDICATIONS  (STANDING):  amoxicillin  875 milliGRAM(s)/clavulanate 1Tablet(s) Oral two times a day  levothyroxine 100MICROGram(s) Oral daily  lactobacillus acidophilus 1Tablet(s) Oral two times a day with meals  tamsulosin 0.4milliGRAM(s) Oral at bedtime  nystatin Cream 1Application(s) Topical two times a day  dextrose 5%. 1000milliLiter(s) IV Continuous <Continuous>  potassium chloride    Tablet ER 40milliEquivalent(s) Oral once    MEDICATIONS  (PRN):  acetaminophen   Tablet 650milliGRAM(s) Oral every 6 hours PRN For Temp greater than 38 C (100.4 F)  ondansetron Injectable 4milliGRAM(s) IV Push every 6 hours PRN Nausea and/or Vomiting  morphine  - Injectable 2milliGRAM(s) IV Push every 4 hours PRN Moderate Pain (4 - 6)      Allergies    No Known Allergies    Intolerances        SOCIAL HISTORY:    FAMILY HISTORY:  No pertinent family history in first degree relatives      Vital Signs Last 24 Hrs  T(C): 37.6, Max: 37.7 (05-06 @ 13:49)  T(F): 99.6, Max: 99.8 (05-06 @ 13:49)  HR: 107 (94 - 111)  BP: 153/81 (120/66 - 168/81)  BP(mean): --  RR: 18 (17 - 20)  SpO2: 93% (91% - 97%)    PHYSICAL EXAM:      Constitutional:  She appears comfortable    Gastrointestinal:  ostomy changed.  minimal bleeding from edge of stoma.  Small are of necrosis on tip of stoma, otherwise viable          LABS:                        11.4   16.4  )-----------( 167      ( 07 May 2017 07:31 )             36.8     05-07    151<H>  |  116<H>  |  12  ----------------------------<  127<H>  3.2<L>   |  22  |  1.00    Ca    8.7      07 May 2017 07:31    TPro  7.0  /  Alb  2.5<L>  /  TBili  0.7  /  DBili  x   /  AST  16  /  ALT  12  /  AlkPhos  62  05-06          RADIOLOGY & ADDITIONAL STUDIES:

## 2017-05-07 NOTE — PROGRESS NOTE ADULT - NSHPATTENDINGPLANDISCUSS_GEN_ALL_CORE
Dr Willson
nurse and will discuss with dr jordan
patient
sw ,HCN representaive ,sx ,gi and pall care consultants
niece, PCP
hcp at bedside
daughter HCP at bedside and RN
niece, PCP

## 2017-05-07 NOTE — PROGRESS NOTE ADULT - SUBJECTIVE AND OBJECTIVE BOX
INTERVAL HPI/OVERNIGHT EVENTS:    MEDICATIONS  (STANDING):  amoxicillin  875 milliGRAM(s)/clavulanate 1Tablet(s) Oral two times a day  levothyroxine 100MICROGram(s) Oral daily  lactobacillus acidophilus 1Tablet(s) Oral two times a day with meals  tamsulosin 0.4milliGRAM(s) Oral at bedtime  nystatin Cream 1Application(s) Topical two times a day  dextrose 5%. 1000milliLiter(s) IV Continuous <Continuous>    MEDICATIONS  (PRN):  acetaminophen   Tablet 650milliGRAM(s) Oral every 6 hours PRN For Temp greater than 38 C (100.4 F)  ondansetron Injectable 4milliGRAM(s) IV Push every 6 hours PRN Nausea and/or Vomiting  morphine  - Injectable 2milliGRAM(s) IV Push every 4 hours PRN Moderate Pain (4 - 6)      Allergies    No Known Allergies    Intolerances        ROS:   General:  No wt loss, fevers, chills, night sweats, fatigue,   Eyes:  Good vision, no reported pain  ENT:  No sore throat, pain, runny nose, dysphagia  CV:  No pain, palpitations, hypo/hypertension  Resp:  No dyspnea, cough, tachypnea, wheezing  GI:  No pain, No nausea, No vomiting, No diarrhea, No constipation, No weight loss, No fever, No pruritis, No rectal bleeding, No tarry stools, No dysphagia,  :  No pain, bleeding, incontinence, nocturia  Muscle:  No pain, weakness  Neuro:  No weakness, tingling, memory problems  Psych:  No fatigue, insomnia, mood problems, depression  Endocrine:  No polyuria, polydipsia, cold/heat intolerance  Heme:  No petechiae, ecchymosis, easy bruisability  Skin:  No rash, tattoos, scars, edema      PHYSICAL EXAM:   Vital Signs:  Vital Signs Last 24 Hrs  T(C): 37.6, Max: 37.6 (05-07 @ 09:17)  T(F): 99.6, Max: 99.6 (05-07 @ 09:17)  HR: 107 (107 - 111)  BP: 153/81 (120/66 - 168/81)  BP(mean): --  RR: 18 (18 - 20)  SpO2: 93% (91% - 94%)  Daily     Daily     GENERAL:  Appears stated age, well-groomed, well-nourished, no distress  HEENT:  NC/AT,  conjunctivae clear and pink, no thyromegaly, nodules, adenopathy, no JVD, sclera -anicteric  CHEST:  Full & symmetric excursion, no increased effort, breath sounds clear  HEART:  Regular rhythm, S1, S2, no murmur/rub/S3/S4, no abdominal bruit, no edema  ABDOMEN:  Soft, non-tender, non-distended, normoactive bowel sounds,  no masses ,no hepato-splenomegaly, no signs of chronic liver disease  EXTEREMITIES:  no cyanosis,clubbing or edema  SKIN:  No rash/erythema/ecchymoses/petechiae/wounds/abscess/warm/dry  NEURO:  Alert, oriented, no asterixis, no tremor, no encephalopathy      LABS:                        11.4   16.4  )-----------( 167      ( 07 May 2017 07:31 )             36.8     05-07    151<H>  |  116<H>  |  12  ----------------------------<  127<H>  3.2<L>   |  22  |  1.00    Ca    8.7      07 May 2017 07:31    TPro  7.0  /  Alb  2.5<L>  /  TBili  0.7  /  DBili  x   /  AST  16  /  ALT  12  /  AlkPhos  62  05-06          RADIOLOGY & ADDITIONAL TESTS:

## 2017-05-07 NOTE — PROGRESS NOTE ADULT - ASSESSMENT
89y/o w/f seen at [facility] ER. Lives at [facility]. Patient seen for fever and she is confused, so therefore history from chart.  History Dementia, Breast cancer, Colon cancer (colostomy in place), high cholesterol, thyroid disease, OA.   S/P SALINA.  In ER found to have temperture-101.9 and abnormal U/A.      5/7/17 Patient alert and awake.  Hospice care.  Patient is in her optimal cardiac condition and therefore, no obvious, significant cardiac contra-indications for any urological procedure.

## 2017-05-07 NOTE — PROGRESS NOTE ADULT - SUBJECTIVE AND OBJECTIVE BOX
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  Pt confused and unable  provide further history at this time Diagnosed with renal colic,found to have moderate to severe hydronephrosis with obstructing kidney stone and  eval called by ER Scheduled for OR in am for stent placement as per Dr Chang, urologist arrangement Admit for septic workup and ID evaluation,send blood and urine cx,serial lactate levels,monitor vitals closley,ivfs hydration,monitor urine output and renal profile,iv abx as per id cons Dr Bolden Palliative care consult requested due to overall poor prognosis,to discuss advance directives and complete MOLST (27 Apr 2017 20:30)      REVIEW OF SYSTEMS:    CONSTITUTIONAL: No fever, weight loss, or fatigue  EYES: No eye pain, visual disturbances, or discharge  ENMT:  No difficulty hearing, tinnitus, vertigo; No sinus or throat pain  NECK: No pain or stiffness  BREASTS: No pain, masses, or nipple discharge  RESPIRATORY: No cough, wheezing, chills or hemoptysis; No shortness of breath  CARDIOVASCULAR: No chest pain, palpitations, dizziness, or leg swelling  GASTROINTESTINAL: No abdominal or epigastric pain. No nausea, vomiting, or hematemesis; No diarrhea or constipation. No melena or hematochezia.  GENITOURINARY: No dysuria, frequency, hematuria, or incontinence  NEUROLOGICAL: No headaches, memory loss, loss of strength, numbness, or tremors  SKIN: No itching, burning, rashes, or lesions   LYMPH NODES: No enlarged glands  ENDOCRINE: No heat or cold intolerance; No hair loss  MUSCULOSKELETAL: No joint pain or swelling; No muscle, back, or extremity pain  PSYCHIATRIC: No depression, anxiety, mood swings, or difficulty sleeping  HEME/LYMPH: No easy bruising, or bleeding gums  ALLERGY AND IMMUNOLOGIC: No hives or eczema    MEDICATIONS  (STANDING):  amoxicillin  875 milliGRAM(s)/clavulanate 1Tablet(s) Oral two times a day  levothyroxine 100MICROGram(s) Oral daily  lactobacillus acidophilus 1Tablet(s) Oral two times a day with meals  tamsulosin 0.4milliGRAM(s) Oral at bedtime  nystatin Cream 1Application(s) Topical two times a day  dextrose 5%. 1000milliLiter(s) IV Continuous <Continuous>  potassium chloride    Tablet ER 40milliEquivalent(s) Oral once    MEDICATIONS  (PRN):  acetaminophen   Tablet 650milliGRAM(s) Oral every 6 hours PRN For Temp greater than 38 C (100.4 F)  ondansetron Injectable 4milliGRAM(s) IV Push every 6 hours PRN Nausea and/or Vomiting  morphine  - Injectable 2milliGRAM(s) IV Push every 4 hours PRN Moderate Pain (4 - 6)      ALLERGIES: No Known Allergies      FAMILY HISTORY:  No pertinent family history in first degree relatives      Social history:  Alochol:   Smoking:   Drug Use:   Marital Status:     PHYSICAL EXAMINATION:  -----------------------------  T(C): 37.6, Max: 37.7 (05-06 @ 13:49)  HR: 107 (94 - 111)  BP: 153/81 (120/66 - 168/81)  RR: 18 (17 - 20)  SpO2: 93% (91% - 97%)  Wt(kg): --    I & Os for current day (as of 05-07 @ 10:41)  =============================================  IN:    sodium chloride 0.9%: 1200 ml    Oral Fluid: 220 ml    Total IN: 1420 ml  ---------------------------------------------  OUT:    Colostomy: 350 ml    Total OUT: 350 ml  ---------------------------------------------  Total NET: 1070 ml        Constitutional: well developed, normal appearance, well groomed, well nourished, no deformities and no acute distress.   Eyes: the conjunctiva exhibited no abnormalities and the eyelids demonstrated no xanthelasmas.   HEENT: normal oral mucosa, no oral pallor and no oral cyanosis.   Neck: normal jugular venous A waves present, normal jugular venous V waves present and no jugular venous hua A waves.   Pulmonary: no respiratory distress, normal respiratory rhythm and effort, no accessory muscle use and lungs were clear to auscultation bilaterally.   Cardiovascular: heart rate and rhythm were normal, with II/VI systolic murmur and decreased S1 and normal S2 and no murmur, gallop, rub, heave or thrill are present.   Abdomen: soft, non-tender, no hepato-splenomegaly and no abdominal mass palpated.   Musculoskeletal: the gait could not be assessed..   Extremities: no clubbing of the fingernails, no localized cyanosis, no petechial hemorrhages and no ischemic changes.   Skin: normal skin color and pigmentation, no rash, no venous stasis, no skin lesions, no skin ulcer and no xanthoma was observed.   Psychiatric: oriented to person, place, and time, the affect was normal, the mood was normal and not feeling anxious.     LABS:   --------  05-07    151<H>  |  116<H>  |  12  ----------------------------<  127<H>  3.2<L>   |  22  |  1.00    Ca    8.7      07 May 2017 07:31    TPro  7.0  /  Alb  2.5<L>  /  TBili  0.7  /  DBili  x   /  AST  16  /  ALT  12  /  AlkPhos  62  05-06                         11.4   16.4  )-----------( 167      ( 07 May 2017 07:31 )             36.8                   Radiology:       EXAM:  ECHO TTE W/O CON COMP W/DOPPLR         PROCEDURE DATE:  04/29/2017        INTERPRETATION:  Ordering Physician: WALLACE SERNA [id number]    Indication: Abnormal ECG, cardiac murmur    Technician: MM    Study Quality: Technically difficult   A complete echocardiographic study was performed utilizing standard   protocol including spectral and color Doppler in all echocardiographic   windows.    Height: 162 cm  Weight: 78 kg  BSA: 1.31 m2  Blood Pressure: 120/73 mmHg    MEASUREMENTS  IVS: 0.9 cm  PWT: 0.9 cm  LA: 4.0 cm  AO: 3.0 cm  LVIDd: 4.7 cm  LVIDs: 2.8 cm    LVEF: 65%  RVSP: 64 mmHg  RA Pressure: 3 mmHg    FINDINGS  Left Ventricle: The left ventricle is normal in size and thickness. The   left ventricular systolic function is normal with no wall motion   abnormalities. Estimated EF is 65%.  Right Ventricle: The right ventricle is not well-visualized.  Left Atrium: The left atrium is dilated.  Right Atrium: The right atrium is not well-visualized.  Mitral Valve: Mitral annular calcification. Mild mitral regurgitation.  Aortic Valve: Mild aortic stenosis with a mean gradient of 11 mmHg. The   dimensionless index is >0.50. Mild aortic regurgitation.  Tricuspid Valve: The tricuspid valve is structurally normal. Mild to   moderatetricuspid regurgitation. Estimated pulmonary artery systolic   pressure is at least 64 mmHg.  Pulmonic Valve: The pulmonic valve is not well-visualized. Trace pulmonic   regurgitation.  Pericardium/Pleura: No pericardial effusion visualized. Bilateral pleural   effusions.  Aorta: The aortic root is mildly dilated.      CONCLUSIONS:  Normal left ventricular systolic function. Dilated left atrium. Mild   aortic stenosis. Moderate pulmonary hypertension.                    BECK SERNA   This document has been electronically signed. Apr 30 2017  8:19AM

## 2017-05-08 RX ORDER — ACETAMINOPHEN 500 MG
1 TABLET ORAL
Qty: 0 | Refills: 0 | COMMUNITY
Start: 2017-05-08

## 2017-05-08 RX ORDER — MORPHINE SULFATE 50 MG/1
2 CAPSULE, EXTENDED RELEASE ORAL
Qty: 0 | Refills: 0 | COMMUNITY
Start: 2017-05-08

## 2017-05-08 RX ADMIN — MORPHINE SULFATE 2 MILLIGRAM(S): 50 CAPSULE, EXTENDED RELEASE ORAL at 00:49

## 2017-05-08 RX ADMIN — NYSTATIN CREAM 1 APPLICATION(S): 100000 CREAM TOPICAL at 06:14

## 2017-05-08 RX ADMIN — SODIUM CHLORIDE 40 MILLILITER(S): 9 INJECTION, SOLUTION INTRAVENOUS at 14:37

## 2017-05-08 NOTE — PROGRESS NOTE ADULT - PROBLEM SELECTOR PLAN 5
Supportive care.
continue home meidcations ,dvt proph
Supportive care.

## 2017-05-08 NOTE — PROGRESS NOTE ADULT - PROBLEM SELECTOR PROBLEM 3
Heart murmur
Sepsis, due to unspecified organism
Hydronephrosis with urinary obstruction due to ureteral calculus
Sepsis, due to unspecified organism
Sepsis, due to unspecified organism
Heart murmur
Hydronephrosis with urinary obstruction due to ureteral calculus
Dementia without behavioral disturbance, unspecified dementia type
Urinary tract infection with hematuria, site unspecified

## 2017-05-08 NOTE — PROGRESS NOTE ADULT - PROVIDER SPECIALTY LIST ADULT
Anesthesia
Cardiology
Colorectal Surgery
Gastroenterology
Hospitalist
Infectious Disease
Palliative Care
Pulmonology
Urology
Cardiology

## 2017-05-08 NOTE — PROGRESS NOTE ADULT - PROBLEM SELECTOR PROBLEM 2
Hydronephrosis with urinary obstruction due to ureteral calculus
Nephrolithiasis
Colostomy prolapse
Hydronephrosis with urinary obstruction due to ureteral calculus
Hydronephrosis with urinary obstruction due to ureteral calculus
Colostomy prolapse
Nephrolithiasis
Sepsis, due to unspecified organism
Sepsis, due to unspecified organism

## 2017-05-08 NOTE — PROGRESS NOTE ADULT - PROBLEM SELECTOR PROBLEM 4
Dementia without behavioral disturbance, unspecified dementia type
Hypokalemia
Dementia without behavioral disturbance, unspecified dementia type
Dementia without behavioral disturbance, unspecified dementia type
Sepsis, due to unspecified organism
Hypothyroidism
Sepsis, due to unspecified organism
Colostomy in place
Nephrolithiasis

## 2017-05-08 NOTE — PROGRESS NOTE ADULT - ASSESSMENT
[facility] Case disussed with hospice MD Dr Denis Solitario and social service on 3W Discharge summary prepared electroniclaly May be [facility] when [facility] bed [facility] available

## 2017-05-08 NOTE — PROGRESS NOTE ADULT - SUBJECTIVE AND OBJECTIVE BOX
PROGRESS NOTE    OVERNIGHT    SUBJECTIVE    PAST MEDICAL & SURGICAL HISTORY:  Hypothyroidism: newly diagnosed, started on T4 4/2013  Osteoarthritis  Colon cancer: &gt;30 years ago  Breast CA: &quot;many years ago&quot;  DVT (deep venous thrombosis): left leg not on A/C  HLD (hyperlipidemia)  Dementia  History of hysterectomy  Colostomy in place  Colon perforation  Ileostomy in place    HEALTH ISSUES - PROBLEM Dx:  Hypokalemia: Hypokalemia  GI bleed: GI bleed  Hypernatremia: Hypernatremia  Colostomy prolapse: Colostomy prolapse  Goals of care, counseling/discussion: Goals of care, counseling/discussion  Hydronephrosis with urinary obstruction due to ureteral calculus: Hydronephrosis with urinary obstruction due to ureteral calculus  Sepsis, due to unspecified organism: Sepsis, due to unspecified organism  Dementia without behavioral disturbance, unspecified dementia type: Dementia without behavioral disturbance, unspecified dementia type  HLD (hyperlipidemia): HLD (hyperlipidemia)  Colostomy in place: Colostomy in place  Dementia: Dementia  Osteoarthritis: Osteoarthritis  DVT (deep venous thrombosis): DVT (deep venous thrombosis)  Hypothyroidism: Hypothyroidism  Heart murmur: Heart murmur  Nephrolithiasis: Nephrolithiasis  Urinary tract infection with hematuria, site unspecified: Urinary tract infection with hematuria, site unspecified        FAMILY HISTORY:  No pertinent family history in first degree relatives    Allergies    No Known Allergies    Intolerances        MEDICATIONS  (STANDING):  nystatin Cream 1Application(s) Topical two times a day  dextrose 5%. 1000milliLiter(s) IV Continuous <Continuous>    MEDICATIONS  (PRN):  acetaminophen   Tablet 650milliGRAM(s) Oral every 6 hours PRN For Temp greater than 38 C (100.4 F)  ondansetron Injectable 4milliGRAM(s) IV Push every 6 hours PRN Nausea and/or Vomiting  morphine  - Injectable 2milliGRAM(s) IV Push every 4 hours PRN Moderate Pain (4 - 6)      OBJECTIVE  PHYSICAL EXAM:  T(C): --  HR: --  BP: --  RR: --  SpO2: --  Wt(kg): --  I&O's Summary    I & Os for current day (as of 08 May 2017 10:48)  =============================================  IN: 380 ml / OUT: 150 ml / NET: 230 ml      Appearance: NAD.   HEENT:   Moist oral mucosa. Conjunctiva clear b/l.   Lymphatic: No cervical lymphadenopathy  Cardiovascular: RRR with no m/r/g.  Respiratory: Lungs CTAB.  Gastrointestinal:  Soft, nontender. No rebound. No rigidity. 	  Skin: No rashes, No ecchymoses, No cyanosis.	  Neurologic: Non-focal. Moving all extremities. Following commands.  Extremities: No edema. No erythema. No calf tenderness. Malvin's negative.  Vascular: DP 2+ b/l.  	  LABS:                        11.4   16.4  )-----------( 167      ( 07 May 2017 07:31 )             36.8     05-07    151<H>  |  116<H>  |  12  ----------------------------<  127<H>  3.2<L>   |  22  |  1.00    Ca    8.7      07 May 2017 07:31    TPro  7.0  /  Alb  2.5<L>  /  TBili  0.7  /  DBili  x   /  AST  16  /  ALT  12  /  AlkPhos  62  05-06          RADIOLOGY & ADDITIONAL TESTS:    ASSESSMENT/PLAN: PROGRESS NOTE    OVERNIGHT  No new events reported Poor po intake Melena   SUBJECTIVE  Confused ,poor mentation Not able to answer my questions   PAST MEDICAL & SURGICAL HISTORY:  Hypothyroidism: newly diagnosed, started on T4 4/2013  Osteoarthritis  Colon cancer: &gt;30 years ago  Breast CA: &quot;many years ago&quot;  DVT (deep venous thrombosis): left leg not on A/C  HLD (hyperlipidemia)  Dementia  History of hysterectomy  Colostomy in place  Colon perforation  Ileostomy in place    HEALTH ISSUES - PROBLEM Dx:  Hypokalemia: Hypokalemia  GI bleed: GI bleed  Hypernatremia: Hypernatremia  Colostomy prolapse: Colostomy prolapse  Goals of care, counseling/discussion: Goals of care, counseling/discussion  Hydronephrosis with urinary obstruction due to ureteral calculus: Hydronephrosis with urinary obstruction due to ureteral calculus  Sepsis, due to unspecified organism: Sepsis, due to unspecified organism  Dementia without behavioral disturbance, unspecified dementia type: Dementia without behavioral disturbance, unspecified dementia type  HLD (hyperlipidemia): HLD (hyperlipidemia)  Colostomy in place: Colostomy in place  Dementia: Dementia  Osteoarthritis: Osteoarthritis  DVT (deep venous thrombosis): DVT (deep venous thrombosis)  Hypothyroidism: Hypothyroidism  Heart murmur: Heart murmur  Nephrolithiasis: Nephrolithiasis  Urinary tract infection with hematuria, site unspecified: Urinary tract infection with hematuria, site unspecified        FAMILY HISTORY:  No pertinent family history in first degree relatives    Allergies    No Known Allergies    Intolerances        MEDICATIONS  (STANDING):  nystatin Cream 1Application(s) Topical two times a day  dextrose 5%. 1000milliLiter(s) IV Continuous <Continuous>    MEDICATIONS  (PRN):  acetaminophen   Tablet 650milliGRAM(s) Oral every 6 hours PRN For Temp greater than 38 C (100.4 F)  ondansetron Injectable 4milliGRAM(s) IV Push every 6 hours PRN Nausea and/or Vomiting  morphine  - Injectable 2milliGRAM(s) IV Push every 4 hours PRN Moderate Pain (4 - 6)      OBJECTIVE  PHYSICAL EXAM:  T(C): --  HR: --  BP: --  RR: --  SpO2: --  Wt(kg): --  I&O's Summary    I & Os for current day (as of 08 May 2017 10:48)  =============================================  IN: 380 ml / OUT: 150 ml / NET: 230 ml      Appearance: NAD. Confused   HEENT:   Moist oral mucosa. Conjunctiva clear b/l.   Lymphatic: No cervical lymphadenopathy  Cardiovascular: RRR with no m/r/g.  Respiratory: Lungs   Gastrointestinal:  Colostomy -dark brown fecal material	  Skin: No rashes, No ecchymoses, No cyanosis.	  Neurologic: Non-focal. Moving all extremities. Following commands.  Extremities: No edema. No erythema. No calf tenderness. Malvin's negative.  Vascular: DP 2+ b/l.  	  LABS:                        11.4   16.4  )-----------( 167      ( 07 May 2017 07:31 )             36.8     05-07    151<H>  |  116<H>  |  12  ----------------------------<  127<H>  3.2<L>   |  22  |  1.00    Ca    8.7      07 May 2017 07:31    TPro  7.0  /  Alb  2.5<L>  /  TBili  0.7  /  DBili  x   /  AST  16  /  ALT  12  /  AlkPhos  62  05-06          RADIOLOGY & ADDITIONAL TESTS:    ASSESSMENT/PLAN:

## 2017-05-08 NOTE — PROGRESS NOTE ADULT - SUBJECTIVE AND OBJECTIVE BOX
Chief Complaint: AMS, fever    Interval Events: No events.    Review of Systems:  General: No fevers, chills, weight loss or gain  Skin: No rashes, color changes  Cardiovascular: No chest pain, orthopnea  Respiratory: No shortness of breath, cough  Gastrointestinal: No nausea, abdominal pain  Genitourinary: No incontinence, pain with urination  Musculoskeletal: No pain, swelling, decreased range of motion  Neurological: No headache, weakness  Psychiatric: No depression, anxiety  Endocrine: No weight loss or gain, increased thirst  All other systems are comprehensively negative.    Physical Exam:  Vital Signs Last 24 Hrs  T(C): 37.6, Max: 37.6 (05-07 @ 09:17)  T(F): 99.6, Max: 99.6 (05-07 @ 09:17)  HR: 107 (107 - 107)  BP: 153/81 (153/81 - 153/81)  BP(mean): --  RR: 18 (18 - 18)  SpO2: 93% (93% - 93%)  General: NAD  Neck: No JVD, no carotid bruit  Lungs: bilateral wheezing  CV: RRR, nl S1/S2, no M/R/G  Abdomen: S/NT/ND, +BS  Extremities: No LE edema, no cyanosis    Labs:    05-07    151<H>  |  116<H>  |  12  ----------------------------<  127<H>  3.2<L>   |  22  |  1.00    Ca    8.7      07 May 2017 07:31    TPro  7.0  /  Alb  2.5<L>  /  TBili  0.7  /  DBili  x   /  AST  16  /  ALT  12  /  AlkPhos  62  05-06                          11.4   16.4  )-----------( 167      ( 07 May 2017 07:31 )             36.8

## 2017-05-08 NOTE — PROGRESS NOTE ADULT - SUBJECTIVE AND OBJECTIVE BOX
patient appaers comfortable  ABdomen soft peristomal tenderness  Dx s/o ileostomy  continue current management  Hospice when able

## 2017-05-08 NOTE — PROGRESS NOTE ADULT - PROBLEM SELECTOR PROBLEM 6
Hypernatremia
Osteoarthritis
Hypernatremia

## 2017-05-08 NOTE — PROGRESS NOTE ADULT - PROBLEM SELECTOR PROBLEM 1
Colostomy prolapse
Goals of care, counseling/discussion
Urinary tract infection with hematuria, site unspecified
Goals of care, counseling/discussion
Urinary tract infection with hematuria, site unspecified
Colostomy prolapse
Hydronephrosis with urinary obstruction due to ureteral calculus
Hydronephrosis with urinary obstruction due to ureteral calculus

## 2017-05-08 NOTE — PROGRESS NOTE ADULT - SUBJECTIVE AND OBJECTIVE BOX
INTERVAL HPI/OVERNIGHT EVENTS:  No new overnight event.  No N/V/D.  Tolerating diet.    MEDICATIONS  (STANDING):  nystatin Cream 1Application(s) Topical two times a day  dextrose 5%. 1000milliLiter(s) IV Continuous <Continuous>    MEDICATIONS  (PRN):  acetaminophen   Tablet 650milliGRAM(s) Oral every 6 hours PRN For Temp greater than 38 C (100.4 F)  ondansetron Injectable 4milliGRAM(s) IV Push every 6 hours PRN Nausea and/or Vomiting  morphine  - Injectable 2milliGRAM(s) IV Push every 4 hours PRN Moderate Pain (4 - 6)      Allergies    No Known Allergies    Intolerances        Review of Systems:    General:  No wt loss, fevers, chills, night sweats,fatigue,   Eyes:  Good vision, no reported pain  ENT:  No sore throat, pain, runny nose, dysphagia  CV:  No pain, palpitatioins, hypo/hypertension  Resp:  No dyspnea, cough, tachypnea, wheezing    :  No pain, bleeding, incontinence, nocturia  Muscle:  No pain, weakness  Neuro:  No weakness, tingling, memory problems  Psych:  No fatigue, insomnia, mood problems, depression  Endocrine:  No polyuria, polydypsia, cold/heat intolerance  Heme:  No petechiae, ecchymosis, easy bruisability  Skin:  No rash, tattoos, scars, edema      Vital Signs Last 24 Hrs  T(C): --  T(F): --  HR: --  BP: --  BP(mean): --  RR: --  SpO2: --    PHYSICAL EXAM:    Constitutional: NAD, well-developed  HEENT: EOMI, throat clear  Neck: No LAD, supple  Respiratory: CTA and P  Cardiovascular: S1 and S2, RRR, no M  Gastrointestinal: BS+, soft, NT/ND, neg HSM, ostomy  Extremities: No peripheral edema, neg clubing, cyanosis  Vascular: 2+ peripheral pulses  Neurological: A/O x 3, no focal deficits  Psychiatric: Normal mood, normal affect  Skin: No rashes      LABS:                        11.4   16.4  )-----------( 167      ( 07 May 2017 07:31 )             36.8     05-07    151<H>  |  116<H>  |  12  ----------------------------<  127<H>  3.2<L>   |  22  |  1.00    Ca    8.7      07 May 2017 07:31    TPro  7.0  /  Alb  2.5<L>  /  TBili  0.7  /  DBili  x   /  AST  16  /  ALT  12  /  AlkPhos  62  05-06          RADIOLOGY & ADDITIONAL TESTS:

## 2017-05-12 DIAGNOSIS — I25.10 ATHEROSCLEROTIC HEART DISEASE OF NATIVE CORONARY ARTERY WITHOUT ANGINA PECTORIS: ICD-10-CM

## 2017-05-12 DIAGNOSIS — K94.09 OTHER COMPLICATIONS OF COLOSTOMY: ICD-10-CM

## 2017-05-12 DIAGNOSIS — J96.01 ACUTE RESPIRATORY FAILURE WITH HYPOXIA: ICD-10-CM

## 2017-05-12 DIAGNOSIS — E87.6 HYPOKALEMIA: ICD-10-CM

## 2017-05-12 DIAGNOSIS — Z86.718 PERSONAL HISTORY OF OTHER VENOUS THROMBOSIS AND EMBOLISM: ICD-10-CM

## 2017-05-12 DIAGNOSIS — R01.1 CARDIAC MURMUR, UNSPECIFIED: ICD-10-CM

## 2017-05-12 DIAGNOSIS — N39.0 URINARY TRACT INFECTION, SITE NOT SPECIFIED: ICD-10-CM

## 2017-05-12 DIAGNOSIS — Z79.01 LONG TERM (CURRENT) USE OF ANTICOAGULANTS: ICD-10-CM

## 2017-05-12 DIAGNOSIS — R21 RASH AND OTHER NONSPECIFIC SKIN ERUPTION: ICD-10-CM

## 2017-05-12 DIAGNOSIS — N13.2 HYDRONEPHROSIS WITH RENAL AND URETERAL CALCULOUS OBSTRUCTION: ICD-10-CM

## 2017-05-12 DIAGNOSIS — N17.9 ACUTE KIDNEY FAILURE, UNSPECIFIED: ICD-10-CM

## 2017-05-12 DIAGNOSIS — E87.0 HYPEROSMOLALITY AND HYPERNATREMIA: ICD-10-CM

## 2017-05-12 DIAGNOSIS — D69.6 THROMBOCYTOPENIA, UNSPECIFIED: ICD-10-CM

## 2017-05-12 DIAGNOSIS — E03.9 HYPOTHYROIDISM, UNSPECIFIED: ICD-10-CM

## 2017-05-12 DIAGNOSIS — E87.2 ACIDOSIS: ICD-10-CM

## 2017-05-12 DIAGNOSIS — I10 ESSENTIAL (PRIMARY) HYPERTENSION: ICD-10-CM

## 2017-05-12 DIAGNOSIS — A41.9 SEPSIS, UNSPECIFIED ORGANISM: ICD-10-CM

## 2017-05-12 DIAGNOSIS — Z66 DO NOT RESUSCITATE: ICD-10-CM

## 2017-05-12 DIAGNOSIS — Z85.038 PERSONAL HISTORY OF OTHER MALIGNANT NEOPLASM OF LARGE INTESTINE: ICD-10-CM

## 2017-05-12 DIAGNOSIS — Z85.3 PERSONAL HISTORY OF MALIGNANT NEOPLASM OF BREAST: ICD-10-CM

## 2017-05-12 DIAGNOSIS — E78.5 HYPERLIPIDEMIA, UNSPECIFIED: ICD-10-CM

## 2017-05-12 DIAGNOSIS — J18.9 PNEUMONIA, UNSPECIFIED ORGANISM: ICD-10-CM

## 2017-05-12 DIAGNOSIS — D64.9 ANEMIA, UNSPECIFIED: ICD-10-CM

## 2017-05-12 DIAGNOSIS — R31.9 HEMATURIA, UNSPECIFIED: ICD-10-CM

## 2017-05-12 DIAGNOSIS — M19.90 UNSPECIFIED OSTEOARTHRITIS, UNSPECIFIED SITE: ICD-10-CM

## 2017-05-12 DIAGNOSIS — B96.4 PROTEUS (MIRABILIS) (MORGANII) AS THE CAUSE OF DISEASES CLASSIFIED ELSEWHERE: ICD-10-CM

## 2017-10-13 NOTE — H&P ADULT - GENITOURINARY
Verbal - The patient responds to verbal stimuli by opening their eyes when someone speaks to them. The patient is not fully oriented to time, place, or person.
not applicable

## 2018-06-13 NOTE — DISCHARGE NOTE ADULT - NURSING SECTION COMPLETE
Patient's Daughter in law, Francisco Starr, needs a call back to discuss letter that was received in the mail for patient to see Kidney specialist & also needs to discuss Arthritis results. Please call to advise.  Thank you
Spoke with patient and his wife. Two pt identifiers confirmed. Patient states that he is calling to discuss a lab letter that they received in the mail. Results discussed. Pt verbalized understanding of information discussed w/ no further questions at this time.
Patient/Caregiver provided printed discharge information.

## 2020-09-19 NOTE — ED PROVIDER NOTE - CPE EDP HEAD NORM PED
Nutrition Care Plan    Nutrition Diagnosis:   Increased nutrient needs  related to sacrum pressure injury noted as evidenced by increased protein needs to promote healing.    Intervention:  Modified diet:   Consistent Carb moderate   Commercial beverage: Will provide high protein low carb oral nutrition supplement BID (lunch, dinner) to provide an additional 320kcal and 32g protein.    Palliative consult noted.  Will continue to follow plan of care.      Monitoring and Evaluation:  Amount of food: Goal: Patient to consume % of meals.    Liquid meal replacement or supplement: Goal: Patient to accept and consume % of oral nutrition supplements.        Head atraumatic, normal cephalic shape.

## 2022-08-10 NOTE — PROGRESS NOTE ADULT - SUBJECTIVE AND OBJECTIVE BOX
Pt was reassessed after she was placed 5 point restraints. Pt calm and cooperative. Pt contracted to safety.Restraints discontinued. Pt denies pain and discomfort. No injures noted. Pt remained 1:1 staff for safety. Food and drinks provided. Vitals obtained and remained normal.   INTERVAL HPI/OVERNIGHT EVENTS:    No new overnight event.  No N/V/D.  Tolerating diet.       MEDICATIONS  (STANDING):    MEDICATIONS  (PRN):      Allergies    No Known Allergies    Intolerances        Review of Systems:    General:  No wt loss, fevers, chills, night sweats,fatigue,   Eyes:  Good vision, no reported pain  ENT:  No sore throat, pain, runny nose, dysphagia  CV:  No pain, palpitatioins, hypo/hypertension  Resp:  No dyspnea, cough, tachypnea, wheezing  GI:  No pain, No nausea, No vomiting, No diarrhea, No constipatiion, No weight loss, No fever, No pruritis, No rectal bleeding, No tarry stools, No dysphagia,  :  No pain, bleeding, incontinence, nocturia  Muscle:  No pain, weakness  Neuro:  No weakness, tingling, memory problems  Psych:  No fatigue, insomnia, mood problems, depression  Endocrine:  No polyuria, polydypsia, cold/heat intolerance  Heme:  No petechiae, ecchymosis, easy bruisability  Skin:  No rash, tattoos, scars, edema      Vital Signs Last 24 Hrs  T(C): 36.8, Max: 36.8 (05-05 @ 05:01)  T(F): 98.2, Max: 98.2 (05-05 @ 05:01)  HR: 81 (81 - 85)  BP: 157/64 (145/84 - 157/64)  BP(mean): --  RR: 18 (17 - 18)  SpO2: 93% (93% - 95%)    PHYSICAL EXAM:    Constitutional: NAD, well-developed  HEENT: EOMI, throat clear  Neck: No LAD, supple  Respiratory: CTA and P  Cardiovascular: S1 and S2, RRR, no M  Gastrointestinal: BS+, soft, NT/ND, neg HSM,  Extremities: No peripheral edema, neg clubing, cyanosis  Vascular: 2+ peripheral pulses  Neurological: A/O x 3, no focal deficits  Psychiatric: Normal mood, normal affect  Skin: No rashes      LABS:                        10.8   8.7   )-----------( 126      ( 04 May 2017 16:03 )             34.1     05-04    144  |  113<H>  |  10  ----------------------------<  82  3.1<L>   |  20<L>  |  0.88    Ca    8.1<L>      04 May 2017 16:03            RADIOLOGY & ADDITIONAL TESTS:

## 2023-02-14 NOTE — PROGRESS NOTE ADULT - PROBLEM SELECTOR PLAN 8
[Patient/caregiver able to verbalize understanding of medications, including indications and side effects] : Patient/caregiver able to verbalize understanding of medications, including indications and side effects routine care as per protocol routine care as per protocol HCP refused surg consult ,she stated she will not consent for sx tx CMO disussed and hcp requests hospice eval for home hospice at custodial  vs inpatient hospice facilities placement

## 2024-01-13 NOTE — PATIENT PROFILE ADULT. - BLOOD AVOIDANCE/RESTRICTIONS, PROFILE
01/13/24                            Shirin Gonsalez  [address] [facility] [phone]    To Whom It May Concern: This is to certify Liana Payne was evaluated with Melo Correia MD on 01/13/24 and can return to regular work on 1/16/2024    if all tests are negative . Obinna Howard      RESTRICTIONS:    IF COVID + > quarantine 5 d from positive test   no work/school /sports / travel   RTW  day #6  ,if NO symptoms,   Then mask for 5 more days   purchase pulse Oximeter: if <90% go to ER,  inform contacts from 7 -10 days prior      If STREP + > quarantine 2 days, need Antibiotics ; inform contacts from 72 h ago ,throw away tooth brush after 3 d     If INFLUENZA + > quarantine 4 days and NO fever;  possible Tamiflu  rx ; inform all contacts  from 7 days  ago     IF RSV + >  causes cough and bronchospasm, highly contagious & may be dangerous for children under 3 yo,  quarantine 4 days, may be prescribed inhaler      Anton Dowell [facility]  [address] [facility] [phone]  Dept Phone: [phone]
none

## 2024-03-19 NOTE — PROGRESS NOTE ADULT - PROBLEM SELECTOR PLAN 3
Last CPE: 6/6/2023 w/ Dr. Pepper  NOV: 6/11/2024 w/ Dr. Pepper  Requesting:      Disp Refills Start End    LORazepam (Ativan) 1 MG tablet 30 tablet 1 8/28/2023 --    Sig - Route: Take 1 tablet by mouth every 8 hours as needed for Anxiety. - Oral    Patient comment: Please provide 1, 30 tablet refill on Ativan.   Medication refilled.      Disp Refills Start End    norethindrone-ethinyl estradiol (Cyclafem 1/35) 1-35 MG-MCG per tablet 84 tablet 3 6/6/2023 --    Sig - Route: Take 1 tablet by mouth daily. - Oral    Patient comment: I did not get enough pills with my annual appointment last year. I need 4 more packs of birth control pills to get to my appointment in June. Please write the prescription so I can pickup all 4 packs at the same time. Thanks!   Oral Contraceptives Refill Protocol - 12 Month Protocol [id number]/2024 08:45 PM    Seen by prescribing provider or same department within the last 12 months or has a future appt in 3 months - IF FAILED PLEASE LOOK AT CHART REVIEW FOR LAST VISIT AND PROCEED ACCORDINGLY    Patient is not pregnant    Patient is 35 yrs or older and is not a smoker    Medication (including dose and sig) on current meds list        Medication pended. Okay to refill?    No intervention.

## 2024-08-27 NOTE — ED ADULT TRIAGE NOTE - PAIN RATING/NUMBER SCALE (0-10): ACTIVITY
This pt came to her appt today, has been impossible to reach her. Glucoses are all over the place and recommended she come inpt for treatment of N/V and to establish good insulin regimen but she would not. Also she may be transferring to [place] as is closer
0
